# Patient Record
Sex: MALE | Race: WHITE | NOT HISPANIC OR LATINO | Employment: UNEMPLOYED | URBAN - METROPOLITAN AREA
[De-identification: names, ages, dates, MRNs, and addresses within clinical notes are randomized per-mention and may not be internally consistent; named-entity substitution may affect disease eponyms.]

---

## 2019-03-22 ENCOUNTER — HOSPITAL ENCOUNTER (EMERGENCY)
Facility: CLINIC | Age: 29
Discharge: HOME | End: 2019-03-22
Attending: EMERGENCY MEDICINE | Admitting: EMERGENCY MEDICINE
Payer: MEDICAID

## 2019-03-22 VITALS
HEIGHT: 64 IN | DIASTOLIC BLOOD PRESSURE: 97 MMHG | RESPIRATION RATE: 18 BRPM | BODY MASS INDEX: 42.08 KG/M2 | HEART RATE: 93 BPM | SYSTOLIC BLOOD PRESSURE: 142 MMHG | WEIGHT: 246.5 LBS | TEMPERATURE: 97.9 F | OXYGEN SATURATION: 98 %

## 2019-03-22 DIAGNOSIS — S61.211A LACERATION OF LEFT INDEX FINGER WITHOUT FOREIGN BODY WITHOUT DAMAGE TO NAIL, INITIAL ENCOUNTER: ICD-10-CM

## 2019-03-22 PROCEDURE — 90715 TDAP VACCINE 7 YRS/> IM: CPT | Performed by: EMERGENCY MEDICINE

## 2019-03-22 PROCEDURE — 99283 EMERGENCY DEPT VISIT LOW MDM: CPT | Mod: 25 | Performed by: EMERGENCY MEDICINE

## 2019-03-22 PROCEDURE — 90471 IMMUNIZATION ADMIN: CPT | Performed by: EMERGENCY MEDICINE

## 2019-03-22 PROCEDURE — 12001 RPR S/N/AX/GEN/TRNK 2.5CM/<: CPT | Performed by: EMERGENCY MEDICINE

## 2019-03-22 PROCEDURE — 12001 RPR S/N/AX/GEN/TRNK 2.5CM/<: CPT | Mod: Z6 | Performed by: EMERGENCY MEDICINE

## 2019-03-22 PROCEDURE — 25000132 ZZH RX MED GY IP 250 OP 250 PS 637: Performed by: EMERGENCY MEDICINE

## 2019-03-22 PROCEDURE — 25000128 H RX IP 250 OP 636: Performed by: EMERGENCY MEDICINE

## 2019-03-22 RX ORDER — LIDOCAINE HYDROCHLORIDE 10 MG/ML
INJECTION, SOLUTION EPIDURAL; INFILTRATION; INTRACAUDAL; PERINEURAL
Status: DISCONTINUED
Start: 2019-03-22 | End: 2019-03-22 | Stop reason: HOSPADM

## 2019-03-22 RX ORDER — CEPHALEXIN 500 MG/1
500 CAPSULE ORAL ONCE
Status: COMPLETED | OUTPATIENT
Start: 2019-03-22 | End: 2019-03-22

## 2019-03-22 RX ORDER — CEPHALEXIN 500 MG/1
500 CAPSULE ORAL 3 TIMES DAILY
Qty: 30 CAPSULE | Refills: 0 | Status: SHIPPED | OUTPATIENT
Start: 2019-03-22 | End: 2019-04-01

## 2019-03-22 RX ADMIN — CEPHALEXIN 500 MG: 500 CAPSULE ORAL at 19:41

## 2019-03-22 RX ADMIN — CLOSTRIDIUM TETANI TOXOID ANTIGEN (FORMALDEHYDE INACTIVATED), CORYNEBACTERIUM DIPHTHERIAE TOXOID ANTIGEN (FORMALDEHYDE INACTIVATED), BORDETELLA PERTUSSIS TOXOID ANTIGEN (GLUTARALDEHYDE INACTIVATED), BORDETELLA PERTUSSIS FILAMENTOUS HEMAGGLUTININ ANTIGEN (FORMALDEHYDE INACTIVATED), BORDETELLA PERTUSSIS PERTACTIN ANTIGEN, AND BORDETELLA PERTUSSIS FIMBRIAE 2/3 ANTIGEN 0.5 ML: 5; 2; 2.5; 5; 3; 5 INJECTION, SUSPENSION INTRAMUSCULAR at 19:41

## 2019-03-22 SDOH — HEALTH STABILITY: MENTAL HEALTH: HOW OFTEN DO YOU HAVE A DRINK CONTAINING ALCOHOL?: NEVER

## 2019-03-22 ASSESSMENT — ENCOUNTER SYMPTOMS
WOUND: 1
NUMBNESS: 1

## 2019-03-22 ASSESSMENT — MIFFLIN-ST. JEOR: SCORE: 1989.12

## 2019-03-22 NOTE — ED TRIAGE NOTES
Patient was cutting a package open with a butter knife and lacerated index finger of left hand. Does not know when last tetanus shot was.

## 2019-03-22 NOTE — ED AVS SNAPSHOT
[facility], [place], Emergency Department  [address] [place] [id number]  Phone:  [phone]                                    Pedro Funez   MRN: [id number]    Department:  [facility], Emergency Department   Date of Visit:  3/22/2019           After Visit Summary Signature Page    I have received my discharge instructions, and my questions have been answered. I have discussed any challenges I see with this plan with the nurse or doctor.    ..........................................................................................................................................  Patient/Patient Representative Signature      ..........................................................................................................................................  Patient Representative Print Name and Relationship to Patient    ..................................................               ................................................  Date                                   Time    ..........................................................................................................................................  Reviewed by Signature/Title    ...................................................              ..............................................  Date                                               Time          22EPIC Rev 08/18

## 2019-03-22 NOTE — ED PROVIDER NOTES
"  History     Chief Complaint   Patient presents with     Laceration     HPI  Pedro Funez is a 28 year old male with a history of anxiety and depression who presents for evaluation of a finger laceration. Patient states he was cutting a package with a butter knife this morning around 11 AM when he sliced his left index finger. He reports running the finger under tap water and wrapping it with toilet paper. Patient notes some mild numbness around the tip of his finger. He is not anticoagulated and has not taken anything today for pain. Patient states his last tetanus was over 10 years ago.    Past Medical History:   Diagnosis Date     Anxiety      Depressive disorder        Past Surgical History:   Procedure Laterality Date     HEAD & NECK SURGERY      Jaw surgery 2004       History reviewed. No pertinent family history.    Social History     Tobacco Use     Smoking status: Current Every Day Smoker     Packs/day: 1.00     Smokeless tobacco: Current User   Substance Use Topics     Alcohol use: No     Frequency: Never       Current Facility-Administered Medications   Medication     lidocaine (PF) (XYLOCAINE) 1 % injection     cephALEXin (KEFLEX) capsule 500 mg     Tdap (tetanus-diphtheria-acell pertussis) (ADACEL) injection 0.5 mL     Current Outpatient Medications   Medication     cephALEXin (KEFLEX) 500 MG capsule     HYDROXYZINE HCL PO     UNKNOWN TO PATIENT      No Known Allergies    I have reviewed the Medications, Allergies, Past Medical and Surgical History, and Social History in the Epic system.    Review of Systems   Skin: Positive for wound (laceration on left index finger).   Neurological: Positive for numbness (left index).   All other systems reviewed and are negative.      Physical Exam   BP: 142/75  Pulse: 97  Temp: 97.9  F (36.6  C)  Resp: 18  Height: 162.6 cm (5' 4\")  Weight: 111.8 kg (246 lb 8 oz)  SpO2: 97 %      Physical Exam   Constitutional: He is oriented to person, place, and time. Vital " signs are normal. He is cooperative.  Non-toxic appearance. He does not have a sickly appearance. He does not appear ill. No distress.   HENT:   Head: Normocephalic and atraumatic.   Eyes: Conjunctivae and EOM are normal. Pupils are equal, round, and reactive to light.   Neck: Normal range of motion. Neck supple.   Cardiovascular: Normal rate and regular rhythm. Exam reveals no gallop and no friction rub.   No murmur heard.  Pulmonary/Chest: Effort normal and breath sounds normal. No respiratory distress.   Abdominal: Soft. There is no tenderness.   Musculoskeletal: He exhibits no edema or tenderness.   Neurological: He is alert and oriented to person, place, and time. No cranial nerve deficit.   Skin: Skin is warm. Laceration (1 cm laceration on ventral side of left second digit. No evidence of foreign bodies) noted.   Psychiatric: He has a normal mood and affect. His behavior is normal. Judgment and thought content normal.   Nursing note and vitals reviewed.      ED Course   Laceration is simple according to patient he does not have any foreign bodies the physical examination is reassuring I will suture the wound and I will placed on prophylactic antibiotics we will update the tetanus shot.  Based on the history physical examination there is no need for x-rays     Laceration repair  Date/Time: 3/22/2019 7:15 PM  Performed by: Arley White MD  Authorized by: Arley White MD   Consent: Verbal consent obtained.  Risks and benefits: risks, benefits and alternatives were discussed  Consent given by: patient  Patient understanding: patient states understanding of the procedure being performed  Patient consent: the patient's understanding of the procedure matches consent given  Procedure consent: procedure consent matches procedure scheduled  Relevant documents: relevant documents present and verified  Test results: test results available and properly labeled  Site marked: the operative site was  "marked  Imaging studies: imaging studies not available  Required items: required blood products, implants, devices, and special equipment available  Patient identity confirmed: verbally with patient  Time out: Immediately prior to procedure a \"time out\" was called to verify the correct patient, procedure, equipment, support staff and site/side marked as required.  Body area: upper extremity  Location details: left index finger  Laceration length: 1 cm  Foreign bodies: no foreign bodies  Tendon involvement: none  Nerve involvement: none  Vascular damage: no  Anesthesia: local infiltration    Anesthesia:  Local Anesthetic: lidocaine 1% without epinephrine  Anesthetic total: 2 mL    Sedation:  Patient sedated: no    Preparation: Patient was prepped and draped in the usual sterile fashion.  Irrigation solution: saline  Irrigation method: jet lavage  Amount of cleaning: extensive  Debridement: moderate  Degree of undermining: none  Skin closure: 4-0 nylon  Technique: simple  Approximation: close  Approximation difficulty: simple  Dressing: 4x4 sterile gauze and antibiotic ointment  Patient tolerance: Patient tolerated the procedure well with no immediate complications               Critical Care time:  none             Labs Ordered and Resulted from Time of ED Arrival Up to the Time of Departure from the ED - No data to display         Assessments & Plan (with Medical Decision Making)   This is a 28-year-old male that came in for evaluation of simple laceration of the left index finger.  This happened as he was using a butter knife there is no possibility of foreign body through the fact that there was no segments of the butter knife missing and there was no other foreign bodies involved.  Patient has a neurologic intact extremity and left index finger, based on history physical examination presentation he was prepared with lidocaine without epinephrine for a total total of 4 sutures he tolerated procedure well.  His " tetanus and p.o. antibiotics will be given well.    I have reviewed the nursing notes.    I have reviewed the findings, diagnosis, plan and need for follow up with the patient.       Medication List      Started    cephALEXin 500 MG capsule  Commonly known as:  KEFLEX  500 mg, Oral, 3 TIMES DAILY            Final diagnoses:   Laceration of left lower extremity, initial encounter   INoble, am serving as a trained medical scribe to document services personally performed by Arley White MD, based on the provider's statements to me.   I, Alrey White MD, was physically present and have reviewed and verified the accuracy of this note documented by Noble Hodge.    3/22/2019   [facility], [place], EMERGENCY DEPARTMENT     Arley White MD  03/22/19 1919       Arley White MD  03/26/19 1515

## 2019-03-23 NOTE — DISCHARGE INSTRUCTIONS
Please take antibiotics as prescribed, make sure to keep the wound clean and dry and follow-up with your primary care doctor for reevaluation of the wound in 2 days, and for removal of the sutures in 7-10 days.  If fevers, redness, nausea vomiting worse symptoms or if any other concerns develop please return to emergency department as soon as possible

## 2019-04-24 ENCOUNTER — HOSPITAL ENCOUNTER (EMERGENCY)
Facility: CLINIC | Age: 29
Discharge: HOME | End: 2019-04-24
Attending: FAMILY MEDICINE | Admitting: FAMILY MEDICINE
Payer: MEDICAID

## 2019-04-24 VITALS
BODY MASS INDEX: 40.97 KG/M2 | WEIGHT: 240 LBS | RESPIRATION RATE: 18 BRPM | SYSTOLIC BLOOD PRESSURE: 140 MMHG | HEART RATE: 90 BPM | TEMPERATURE: 97.7 F | HEIGHT: 64 IN | OXYGEN SATURATION: 100 % | DIASTOLIC BLOOD PRESSURE: 70 MMHG

## 2019-04-24 PROCEDURE — 99281 EMR DPT VST MAYX REQ PHY/QHP: CPT

## 2019-04-24 ASSESSMENT — MIFFLIN-ST. JEOR: SCORE: 1959.63

## 2019-04-24 NOTE — DISCHARGE INSTRUCTIONS
Keep the area, clean and dry. Continue to watch for signs and symptoms of infection: swelling, redness, drainage.

## 2019-04-24 NOTE — ED AVS SNAPSHOT
[facility], [place], Emergency Department  [address] [place] [id number]  Phone:  [phone]                                    Pedro Funez   MRN: [id number]    Department:  [facility], Emergency Department   Date of Visit:  4/24/2019           After Visit Summary Signature Page    I have received my discharge instructions, and my questions have been answered. I have discussed any challenges I see with this plan with the nurse or doctor.    ..........................................................................................................................................  Patient/Patient Representative Signature      ..........................................................................................................................................  Patient Representative Print Name and Relationship to Patient    ..................................................               ................................................  Date                                   Time    ..........................................................................................................................................  Reviewed by Signature/Title    ...................................................              ..............................................  Date                                               Time          22EPIC Rev 08/18

## 2021-08-10 ENCOUNTER — OFFICE VISIT (OUTPATIENT)
Dept: FAMILY MEDICINE | Facility: CLINIC | Age: 31
End: 2021-08-10
Payer: COMMERCIAL

## 2021-08-10 VITALS
BODY MASS INDEX: 36.5 KG/M2 | WEIGHT: 227.1 LBS | SYSTOLIC BLOOD PRESSURE: 127 MMHG | HEIGHT: 66 IN | TEMPERATURE: 98.9 F | HEART RATE: 105 BPM | DIASTOLIC BLOOD PRESSURE: 86 MMHG | OXYGEN SATURATION: 96 %

## 2021-08-10 DIAGNOSIS — F41.9 ANXIETY: ICD-10-CM

## 2021-08-10 DIAGNOSIS — E66.812 CLASS 2 OBESITY WITHOUT SERIOUS COMORBIDITY WITH BODY MASS INDEX (BMI) OF 36.0 TO 36.9 IN ADULT, UNSPECIFIED OBESITY TYPE: ICD-10-CM

## 2021-08-10 DIAGNOSIS — E11.9 TYPE 2 DIABETES MELLITUS WITHOUT COMPLICATION, WITHOUT LONG-TERM CURRENT USE OF INSULIN (H): Primary | ICD-10-CM

## 2021-08-10 DIAGNOSIS — H53.8 BLURRED VISION: ICD-10-CM

## 2021-08-10 LAB
ALBUMIN UR-MCNC: NEGATIVE MG/DL
APPEARANCE UR: CLEAR
BILIRUB UR QL STRIP: NEGATIVE
COLOR UR AUTO: ABNORMAL
GLUCOSE UR STRIP-MCNC: 500 MG/DL
HGB UR QL STRIP: NEGATIVE
KETONES UR STRIP-MCNC: NEGATIVE MG/DL
LEUKOCYTE ESTERASE UR QL STRIP: NEGATIVE
NITRATE UR QL: NEGATIVE
PH UR STRIP: 5.5 [PH] (ref 5–7)
SP GR UR STRIP: 1.02 (ref 1–1.03)
UROBILINOGEN UR STRIP-MCNC: NORMAL MG/DL

## 2021-08-10 PROCEDURE — 81003 URINALYSIS AUTO W/O SCOPE: CPT | Performed by: NURSE PRACTITIONER

## 2021-08-10 RX ORDER — BLOOD-GLUCOSE METER
EACH MISCELLANEOUS
Qty: 1 KIT | Refills: 0 | Status: SHIPPED | OUTPATIENT
Start: 2021-08-10 | End: 2021-09-03

## 2021-08-10 RX ORDER — BUPROPION HYDROCHLORIDE 150 MG/1
150 TABLET, EXTENDED RELEASE ORAL 2 TIMES DAILY
COMMUNITY
End: 2021-09-14

## 2021-08-10 RX ORDER — HYDROXYZINE HYDROCHLORIDE 25 MG/1
25 TABLET, FILM COATED ORAL 3 TIMES DAILY PRN
Qty: 90 TABLET | Refills: 1 | Status: SHIPPED | OUTPATIENT
Start: 2021-08-10 | End: 2022-05-10

## 2021-08-10 ASSESSMENT — ANXIETY QUESTIONNAIRES
5. BEING SO RESTLESS THAT IT IS HARD TO SIT STILL: MORE THAN HALF THE DAYS
7. FEELING AFRAID AS IF SOMETHING AWFUL MIGHT HAPPEN: MORE THAN HALF THE DAYS
3. WORRYING TOO MUCH ABOUT DIFFERENT THINGS: NEARLY EVERY DAY
6. BECOMING EASILY ANNOYED OR IRRITABLE: MORE THAN HALF THE DAYS
GAD7 TOTAL SCORE: 16
1. FEELING NERVOUS, ANXIOUS, OR ON EDGE: NEARLY EVERY DAY
IF YOU CHECKED OFF ANY PROBLEMS ON THIS QUESTIONNAIRE, HOW DIFFICULT HAVE THESE PROBLEMS MADE IT FOR YOU TO DO YOUR WORK, TAKE CARE OF THINGS AT HOME, OR GET ALONG WITH OTHER PEOPLE: SOMEWHAT DIFFICULT
2. NOT BEING ABLE TO STOP OR CONTROL WORRYING: NEARLY EVERY DAY

## 2021-08-10 ASSESSMENT — MIFFLIN-ST. JEOR: SCORE: 1913.11

## 2021-08-10 ASSESSMENT — PATIENT HEALTH QUESTIONNAIRE - PHQ9
SUM OF ALL RESPONSES TO PHQ QUESTIONS 1-9: 13
5. POOR APPETITE OR OVEREATING: SEVERAL DAYS

## 2021-08-10 NOTE — PATIENT INSTRUCTIONS
Diabetes Type 2  Restart metformin 1000mg twice daily  Labs today: A1c, CBC, CMP, UA, TSH microalbumin,lipids  Referred to ophthalmology for eye exam  Foot checks daily: they look great today  Goal A1c~7%  Goal -150  Acucheck glucometer and lancets ordered    Anxiety and depression  Refilled Hydroxyzine 25mg every 8 hours as needed for anxiety.   Discussed counseling referral.   Plan to take buproprion daily at 150mg twice daily    Establish care  Return to clinic for full physical exam and lab results

## 2021-08-10 NOTE — LETTER
August 11, 2021      Pedro Funez  [address]   [facility] [address]        Dear Mr.Siegl Funez,    We are writing to inform you of your test results.    Test results indicate you may require additional follow up, see comment below.    Resulted Orders   Urinalysis, Micro If (UA) ([facility])   Result Value Ref Range    Color Urine Light Yellow Colorless, Straw, Light Yellow, Yellow    Appearance Urine Clear Clear    Glucose Urine 500  (A) Negative mg/dL    Bilirubin Urine Negative Negative    Ketones Urine Negative Negative mg/dL    Specific Gravity Urine 1.022 1.003 - 1.035    Blood Urine Negative Negative    pH Urine 5.5 5.0 - 7.0    Protein Albumin Urine Negative Negative mg/dL    Urobilinogen Urine Normal Normal, 2.0 mg/dL    Nitrite Urine Negative Negative    Leukocyte Esterase Urine Negative Negative    Narrative    Microscopic not indicated     Hi Pedro, you are definitely spilling a lot of sugar in your urine. Please plan to follow up with me as we discussed at your last visit. I understand that the MA was unable to draw your blood, so either we can send you to the [facility] for a lab draw or we can try them here when you return. Thank you.   If you have any questions or concerns, please call the clinic at the number listed above.       Sincerely,      JUAN ALBERTO Moncada CNP

## 2021-08-10 NOTE — PROGRESS NOTES
HPI       Pedro Funez is a 31 year old  who presents for No chief complaint on file.    31 year old male diagnosed with DM 2 6 years ago. Has had housing in [place] [facility] going on 3 years, Prior to that was homeless which was when he was diagnosed. Was taking metformin but been out of Metformin for at least year. Has not had follow up in a bout 1 year.  Current symptoms: no increased thirst or urination, has not noted burning on urination. Denies GI symptoms.   Has noted trouble sleeping secondary to depression, anxiety. Denies numbness or tingling in fingers and toes.  Denies sweating. Has days where cannot think properly.   Has had [place] COVID vaccine.    Feels like weight has been stable.     Recurrent depression and anxiety x 10 years. Takes buproprion and prn hydroxyzine sometimes but not regularly. Estimate is he takes bupropion every other day.   Not currently suicidal, but has thought about it in the past. No current plan.  Has family supports    Problem, Medication and Allergy Lists were   reviewed and updated if needed.   There are no problems to display for this patient.        Current Outpatient Medications   Medication Sig Dispense Refill     blood glucose monitoring (ACCU-CHEK FERMIN PLUS) meter device kit Use to test blood sugars 2 times daily or as directed. 1 kit 0     blood glucose monitoring (ACCU-CHEK MULTICLIX) lancets Use to test blood sugar 2 times daily or as directed. 102 each prn     buPROPion (WELLBUTRIN SR) 150 MG 12 hr tablet Take 150 mg by mouth 2 times daily       hydrOXYzine (ATARAX) 25 MG tablet Take 1 tablet (25 mg) by mouth 3 times daily as needed for anxiety 90 tablet 1     metFORMIN (GLUCOPHAGE) 1000 MG tablet Take 1 tablet (1,000 mg) by mouth 2 times daily (with meals) 180 tablet 1       No Known Allergies.    Patient is   a new patient to this clinic and so  I reviewed/updated the Past Medical History, the Family History and the Social History   Past Medical  History:   Diagnosis Date     Anxiety      Depressive disorder      Type 2 diabetes mellitus without complication, without long-term current use of insulin (H)     Diagnosed 2016     Family History   Problem Relation Age of Onset     Hypertension Mother      Diabetes Type 1 Maternal Grandmother      Diabetes Maternal Grandfather         Type 2     Social History     Socioeconomic History     Marital status: Single     Spouse name: None     Number of children: None     Years of education: None     Highest education level: None   Occupational History     None   Tobacco Use     Smoking status: Former Smoker     Packs/day: 0.00     Years: 4.00     Pack years: 0.00     Types: Cigarettes     Quit date:      Years since quittin.6     Smokeless tobacco: Current User     Types: Chew   Substance and Sexual Activity     Alcohol use: No     Drug use: No     Sexual activity: Never   Other Topics Concern     None   Social History Narrative    Unemployed currently; retail     Social Determinants of Health     Financial Resource Strain:      Difficulty of Paying Living Expenses:    Food Insecurity:      Worried About Running Out of Food in the Last Year:      Ran Out of Food in the Last Year:    Transportation Needs:      Lack of Transportation (Medical):      Lack of Transportation (Non-Medical):    Physical Activity:      Days of Exercise per Week:      Minutes of Exercise per Session:    Stress:      Feeling of Stress :    Social Connections:      Frequency of Communication with Friends and Family:      Frequency of Social Gatherings with Friends and Family:      Attends Faith Services:      Active Member of Clubs or Organizations:      Attends Club or Organization Meetings:      Marital Status:    Intimate Partner Violence:      Fear of Current or Ex-Partner:      Emotionally Abused:      Physically Abused:      Sexually Abused:    .         Review of Systems:   Review of Systems  GEN: denies weight change, fever,  "chills  CV: negative for chest pain, irregular heart beat or peripheral edema  RESP: negative for SOB, had cough but resolved.    GI: negative for constipation, abdominal pain  : negative for burning on urination, dysuria, urgency or frequency  NEURO:  Negative for numbness or tingling in extremities  MOOD: Anxiety and depression as per HPI       Physical Exam:   /86   Pulse 105   Temp 98.9  F (37.2  C) (Oral)   Ht 1.669 m (5' 5.7\")   Wt 103 kg (227 lb 1.6 oz)   SpO2 96%   BMI 36.99 kg/m    Vital signs normal except DBP elevated, HR>100, BMI elevated     Physical Exam  GEN: alert male in no acute distress  EYES: COLE, EOM intact  Thyroid: smooth and not enlarged  CV: S1 S2 HRRR no MRG, negative peripheral edema  RESP: Lungs CTA  FOOT EXAM: Sensation intact to monofilament.  Feet pink warm, cap refill < 2 seconds. No lesions, some callouses heels bilaterally.   SKIN: pink  Intact.   MOOD:   PHQ 8/10/2021   PHQ-9 Total Score 13   Q9: Thoughts of better off dead/self-harm past 2 weeks Several days     REBA-7 SCORE 8/10/2021   Total Score 16           Results:   Results are ordered and pending    Assessment and Plan       1. Type 2 diabetes mellitus without complication, without long-term current use of insulin (H)  Obesity  Discussed lifestyle: increase fruits and vegetables, low carbohydrate, sweets, fats.Reinforced wallking  - metFORMIN (GLUCOPHAGE) 1000 MG tablet; Take 1 tablet (1,000 mg) by mouth 2 times daily (with meals)  Dispense: 180 tablet; Refill: 1  - Comprehensive metabolic panel; Future  - CBC with platelets; Future  - Hemoglobin A1c; Future  - Urinalysis, Micro If (UA) ([facility]IN[facility]); Future  - Albumin Random Urine Quantitative with Creat Ratio; Future  - TSH with free T4 reflex; Future  - Lipid panel reflex to direct LDL Fasting; Future  - Adult Eye Referral; Future  - blood glucose monitoring (ACCU-CHEK FERMIN PLUS) meter device kit; Use to test blood sugars 2 times daily or as directed. "  Dispense: 1 kit; Refill: 0  - blood glucose monitoring (ACCU-CHEK MULTICLIX) lancets; Use to test blood sugar 2 times daily or as directed.  Dispense: 102 each; Refill: prn  - Comprehensive metabolic panel  - CBC with platelets  - Hemoglobin A1c  - Urinalysis, Micro If (UA) ([facility])  - Albumin Random Urine Quantitative with Creat Ratio  - TSH with free T4 reflex  - Lipid panel reflex to direct LDL Fasting  - PA FOOT EXAM NO CHARGE    2. Anxiety  Discussed and recommended counseling.  Refilled hydroxyzine. Recommend regular buproprion dosing. To notify us if mood worsens. No suicidal plan currently and assessed for supports which he has.   - hydrOXYzine (ATARAX) 25 MG tablet; Take 1 tablet (25 mg) by mouth 3 times daily as needed for anxiety  Dispense: 90 tablet; Refill: 1  - TSH with free T4 reflex; Future  - TSH with free T4 reflex    3. Blurred vision  No recent eye exam. Referred  - Adult Eye Referral; Future    4. Schedule PE and lab results follow up     ADDENDUM: MA Unable to draw labs. Will draw at next encounter.   There are no discontinued medications.    Options for treatment and follow-up care were reviewed with the patient. Pedro Funez  engaged in the decision making process and verbalized understanding of the options discussed and agreed with the final plan.    JUAN ALBERTO Moncada CNP

## 2021-08-10 NOTE — NURSING NOTE
"ROOM:2    Preferred Name: Pedro     31 year old  Chief Complaint   Patient presents with     Establish Care     Pt wanting to esablish care     Diabetes       Blood pressure 127/86, pulse 105, temperature 98.9  F (37.2  C), temperature source Oral, height 1.669 m (5' 5.7\"), weight 103 kg (227 lb 1.6 oz), SpO2 96 %. Body mass index is 36.99 kg/m .  BP completed using cuff size:    There is no problem list on file for this patient.      Wt Readings from Last 2 Encounters:   08/10/21 103 kg (227 lb 1.6 oz)   19 108.9 kg (240 lb)     BP Readings from Last 3 Encounters:   08/10/21 127/86   19 140/70   19 (!) 142/97       No Known Allergies    Current Outpatient Medications   Medication     buPROPion (WELLBUTRIN SR) 150 MG 12 hr tablet     hydrOXYzine (ATARAX) 25 MG tablet     metFORMIN (GLUCOPHAGE) 1000 MG tablet     No current facility-administered medications for this visit.       Social History     Tobacco Use     Smoking status: Former Smoker     Packs/day: 0.00     Years: 4.00     Pack years: 0.00     Types: Cigarettes     Quit date:      Years since quittin.6     Smokeless tobacco: Current User     Types: Chew   Substance Use Topics     Alcohol use: No     Drug use: No       Honoring Choices - Health Care Directive Guide offered to patient at time of visit.    Health Maintenance Due   Topic Date Due     PREVENTIVE CARE VISIT  Never done     ADVANCE CARE PLANNING  Never done     Pneumococcal Vaccine: Pediatrics (0 to 5 Years) and At-Risk Patients (6 to 64 Years) (1 of 2 - PPSV23) Never done     HIV SCREENING  Never done     HEPATITIS C SCREENING  Never done     PHQ-2  Never done       Immunization History   Administered Date(s) Administered     TDAP Vaccine (Adacel) 2019       No results found for: PAP    No lab results found.    No flowsheet data found.    No flowsheet data found.    No flowsheet data found.    No flowsheet data found.    Sunshine Lerma    August 10, 2021 4:16 " PM

## 2021-08-11 ASSESSMENT — ANXIETY QUESTIONNAIRES: GAD7 TOTAL SCORE: 16

## 2021-08-12 DIAGNOSIS — E11.9 TYPE 2 DIABETES MELLITUS WITHOUT COMPLICATION, WITHOUT LONG-TERM CURRENT USE OF INSULIN (H): Primary | ICD-10-CM

## 2021-08-12 NOTE — TELEPHONE ENCOUNTER
blood glucose (NO BRAND SPECIFIED) test strip      Not on active med list  Last Office Visit : 8/10/21  Future Office visit:  8/17/21    Routing refill request to provider for review/approval because:  Drug not active on patient's medication list

## 2021-08-17 ENCOUNTER — OFFICE VISIT (OUTPATIENT)
Dept: FAMILY MEDICINE | Facility: CLINIC | Age: 31
End: 2021-08-17
Payer: COMMERCIAL

## 2021-08-17 ENCOUNTER — OFFICE VISIT (OUTPATIENT)
Dept: PHARMACY | Facility: CLINIC | Age: 31
End: 2021-08-17
Payer: COMMERCIAL

## 2021-08-17 VITALS
HEIGHT: 66 IN | SYSTOLIC BLOOD PRESSURE: 125 MMHG | TEMPERATURE: 98.7 F | OXYGEN SATURATION: 95 % | BODY MASS INDEX: 35.23 KG/M2 | HEART RATE: 105 BPM | DIASTOLIC BLOOD PRESSURE: 97 MMHG | WEIGHT: 219.2 LBS

## 2021-08-17 DIAGNOSIS — E11.9 DIABETES MELLITUS (H): Primary | ICD-10-CM

## 2021-08-17 DIAGNOSIS — E66.01 CLASS 2 SEVERE OBESITY WITH SERIOUS COMORBIDITY AND BODY MASS INDEX (BMI) OF 35.0 TO 35.9 IN ADULT, UNSPECIFIED OBESITY TYPE (H): ICD-10-CM

## 2021-08-17 DIAGNOSIS — Z00.00 HEALTH MAINTENANCE EXAMINATION: ICD-10-CM

## 2021-08-17 DIAGNOSIS — R06.83 SNORING: ICD-10-CM

## 2021-08-17 DIAGNOSIS — E66.01 MORBID OBESITY (H): ICD-10-CM

## 2021-08-17 DIAGNOSIS — E66.812 CLASS 2 SEVERE OBESITY WITH SERIOUS COMORBIDITY AND BODY MASS INDEX (BMI) OF 35.0 TO 35.9 IN ADULT, UNSPECIFIED OBESITY TYPE (H): ICD-10-CM

## 2021-08-17 DIAGNOSIS — E11.9 TYPE 2 DIABETES MELLITUS WITHOUT COMPLICATION, WITHOUT LONG-TERM CURRENT USE OF INSULIN (H): Primary | ICD-10-CM

## 2021-08-17 LAB
ALBUMIN SERPL-MCNC: 4 G/DL (ref 3.4–5)
ALP SERPL-CCNC: 86 U/L (ref 40–150)
ALT SERPL W P-5'-P-CCNC: 40 U/L (ref 0–70)
ANION GAP SERPL CALCULATED.3IONS-SCNC: 7 MMOL/L (ref 3–14)
AST SERPL W P-5'-P-CCNC: 23 U/L (ref 0–45)
BILIRUB SERPL-MCNC: 0.5 MG/DL (ref 0.2–1.3)
BUN SERPL-MCNC: 12 MG/DL (ref 7–30)
CALCIUM SERPL-MCNC: 8.9 MG/DL (ref 8.5–10.1)
CHLORIDE BLD-SCNC: 105 MMOL/L (ref 94–109)
CHOLEST SERPL-MCNC: 190 MG/DL
CO2 SERPL-SCNC: 24 MMOL/L (ref 20–32)
CREAT SERPL-MCNC: 0.75 MG/DL (ref 0.66–1.25)
ERYTHROCYTE [DISTWIDTH] IN BLOOD BY AUTOMATED COUNT: 12.6 % (ref 10–15)
FASTING STATUS PATIENT QL REPORTED: ABNORMAL
GFR SERPL CREATININE-BSD FRML MDRD: >90 ML/MIN/1.73M2
GLUCOSE BLD-MCNC: 104 MG/DL (ref 70–99)
HBA1C MFR BLD: 6.9 %
HCT VFR BLD AUTO: 46 % (ref 40–53)
HDLC SERPL-MCNC: 42 MG/DL
HGB BLD-MCNC: 15.3 G/DL (ref 13.3–17.7)
LDLC SERPL CALC-MCNC: 124 MG/DL
MCH RBC QN AUTO: 30.1 PG (ref 26.5–33)
MCHC RBC AUTO-ENTMCNC: 33.3 G/DL (ref 31.5–36.5)
MCV RBC AUTO: 91 FL (ref 78–100)
NONHDLC SERPL-MCNC: 148 MG/DL
PLATELET # BLD AUTO: 366 10E3/UL (ref 150–450)
POTASSIUM BLD-SCNC: 4.1 MMOL/L (ref 3.4–5.3)
PROT SERPL-MCNC: 8 G/DL (ref 6.8–8.8)
RBC # BLD AUTO: 5.08 10E6/UL (ref 4.4–5.9)
SODIUM SERPL-SCNC: 136 MMOL/L (ref 133–144)
TRIGL SERPL-MCNC: 119 MG/DL
TSH SERPL DL<=0.005 MIU/L-ACNC: 1.39 MU/L (ref 0.4–4)
WBC # BLD AUTO: 7.3 10E3/UL (ref 4–11)

## 2021-08-17 PROCEDURE — 84075 ASSAY ALKALINE PHOSPHATASE: CPT | Performed by: NURSE PRACTITIONER

## 2021-08-17 PROCEDURE — 80061 LIPID PANEL: CPT | Performed by: NURSE PRACTITIONER

## 2021-08-17 PROCEDURE — 85027 COMPLETE CBC AUTOMATED: CPT | Performed by: NURSE PRACTITIONER

## 2021-08-17 PROCEDURE — 84443 ASSAY THYROID STIM HORMONE: CPT | Performed by: NURSE PRACTITIONER

## 2021-08-17 ASSESSMENT — MIFFLIN-ST. JEOR: SCORE: 1878.86

## 2021-08-17 NOTE — LETTER
August 19, 2021      Pedro Funez  [address]   [facility] [address]        Dear Mr.Siegl Funez,    We are writing to inform you of your test results.    Test results indicate you may require additional follow up, see comment below.    Resulted Orders   Hemoglobin A1c   Result Value Ref Range    Hemoglobin A1C 6.9 %   Lipid panel reflex to direct LDL Fasting   Result Value Ref Range    Cholesterol 190 <200 mg/dL      Comment:      Age 0-19 years  Desirable: <170 mg/dL  Borderline high:  170-199 mg/dl  High:            >199 mg/dl    Age 20 years and older  Desirable: <200 mg/dL    Triglycerides 119 <150 mg/dL      Comment:      0-9 years:  Normal:    Less than 75 mg/dL  Borderline high:  75-99 mg/dL  High:             Greater than or equal to 100 mg/dL    0-19 years:  Normal:    Less than 90 mg/dL  Borderline high:   mg/dL  High:             Greater than or equal to 130 mg/dL    20 years and older:  Normal:    Less than 150 mg/dL  Borderline high:  150-199 mg/dL  High:             200-499 mg/dL  Very high:   Greater than or equal to 500 mg/dL    Direct Measure HDL 42 >=40 mg/dL      Comment:      0-19 years:       Greater than or equal to 45 mg/dL   Low: Less than 40 mg/dL   Borderline low: 40-44 mg/dL     20 years and older:   Female: Greater than or equal to 50 mg/dL   Male:   Greater than or equal to 40 mg/dL         LDL Cholesterol Calculated 124 (H) <=100 mg/dL      Comment:      Age 0-19 years:  Desirable: 0-110 mg/dL   Borderline high: 110-129 mg/dL   High: >= 130 mg/dL    Age 20 years and older:  Desirable: <100mg/dL  Above desirable: 100-129 mg/dL   Borderline high: 130-159 mg/dL   High: 160-189 mg/dL   Very high: >= 190 mg/dL    Non HDL Cholesterol 148 (H) <130 mg/dL      Comment:      0-19 years:  Desirable:          Less than 120 mg/dL  Borderline high:   120-144 mg/dL  High:                   Greater than or equal to 145 mg/dL    20 years and older:  Desirable:          130  mg/dL  Above Desirable: 130-159 mg/dL  Borderline high:   160-189 mg/dL  High:               190-219 mg/dL  Very high:     Greater than or equal to 220 mg/dL    Patient Fasting > 8hrs? Unknown    TSH with free T4 reflex   Result Value Ref Range    TSH 1.39 0.40 - 4.00 mU/L   CBC with platelets   Result Value Ref Range    WBC Count 7.3 4.0 - 11.0 10e3/uL    RBC Count 5.08 4.40 - 5.90 10e6/uL    Hemoglobin 15.3 13.3 - 17.7 g/dL    Hematocrit 46.0 40.0 - 53.0 %    MCV 91 78 - 100 fL    MCH 30.1 26.5 - 33.0 pg    MCHC 33.3 31.5 - 36.5 g/dL    RDW 12.6 10.0 - 15.0 %    Platelet Count 366 150 - 450 10e3/uL   Comprehensive metabolic panel   Result Value Ref Range    Sodium 136 133 - 144 mmol/L    Potassium 4.1 3.4 - 5.3 mmol/L    Chloride 105 94 - 109 mmol/L    Carbon Dioxide (CO2) 24 20 - 32 mmol/L    Anion Gap 7 3 - 14 mmol/L    Urea Nitrogen 12 7 - 30 mg/dL    Creatinine 0.75 0.66 - 1.25 mg/dL    Calcium 8.9 8.5 - 10.1 mg/dL    Glucose 104 (H) 70 - 99 mg/dL    Alkaline Phosphatase 86 40 - 150 U/L    AST 23 0 - 45 U/L    ALT 40 0 - 70 U/L    Protein Total 8.0 6.8 - 8.8 g/dL    Albumin 4.0 3.4 - 5.0 g/dL    Bilirubin Total 0.5 0.2 - 1.3 mg/dL    GFR Estimate >90 >60 mL/min/1.73m2      Comment:      As of July 11, 2021, eGFR is calculated by the CKD-EPI creatinine equation, without race adjustment. eGFR can be influenced by muscle mass, exercise, and diet. The reported eGFR is an estimation only and is only applicable if the renal function is stable.     Jet Vasquez, here are your lab results. As you know your 3 month blood sugar average (A1c) was good at 6.9. Your  Blood sugar when you were in was just above normal at 104. Your liver function, kidney function, electrolytes, and complete blood count are all normal. Your cholesterol levels show that your LDL (cholesterol that can cause plaque buildup in your arteries is above normal). Changing your diet to include more vegetables, reducing high fat foods like fried foods, fast  foods, snacks, sweets; losing weight and exercising will be important to reduce the LDL and will help blood sugar also.  We should see you again in 3 months, sooner if you have any concerns. Thank you.   If you have any questions or concerns, please call the clinic at the number listed above.       Sincerely,      JUAN ALBERTO Moncada CNP

## 2021-08-17 NOTE — PATIENT INSTRUCTIONS
Health Maintenance, Type 2 DM  Labs pending: CMP, CBC, Vitamin D, Protein: Creatinine ratio  Continue with metformin, bupropion, and Hydroxyzine at current doses. Notify pharmacy when need refills  Monitor BP: Follow up in 1 month to recheck. Consider addition of lisinopril if BP remains elevated.   Diet: continue to work on weight loss, increase vegetables, decrease high fat, high salt foods. Continue exercise.   Let us know if cannot get glucometer and teststrips      Snoring  Sleep Study ordered

## 2021-08-17 NOTE — PROGRESS NOTES
"SUBJECTIVE: Pedro is a 31 year old male who was referred by Mirian Lo for MT services for diabetes management and medication management.      DM: Since last visit, Pedro was able to  metformin and has been taking it.  He now notes that he is not as groggy and feels he is sleepign better. He did have some stomach upset initially for the first day, but now feels well. He notes that he is supposed to take metformin twice daily, but does forget to take in the evening at times . He is thinking of a way to remember metformin at night.     He has been trying to eat healthier and walking more. Most days he walks for 90 minutes    He doesn't have a glucometer yet since there was some billing confusion at the pharmacy.  He thinks the billing issues have been resolved and will try to  the glucometer tonight.    Anxiety: taking bupropion and hydroxyzine. He feels these are working well. He occasionally forgets to take bupropion in the evening     Environmental factors that impact patient: Notes he lives on limited income - this impacts his food selections      OBJECTIVE:     REBA-7 SCORE 8/10/2021   Total Score 16       PHQ-9 SCORE 8/10/2021   PHQ-9 Total Score 13         VITALS:  BP Readings from Last 3 Encounters:   08/10/21 127/86   04/24/19 140/70   03/22/19 (!) 142/97           Weight:   Wt Readings from Last 1 Encounters:   08/10/21 103 kg (227 lb 1.6 oz)       Height:   Ht Readings from Last 1 Encounters:   08/10/21 1.669 m (5' 5.7\")       LABORATORY VALUES:   Last A1C:  No results found for: A1C.    Last Basic Metabolic Panel:  No results found for: NA   No results found for: POTASSIUM  No results found for: CHLORIDE  No results found for: RODY  No results found for: CO2  No results found for: BUN  No results found for: CR  No results found for: GLC    Lipid Panel Labs  No results found for: CHOL  No results found for: TRIG  No results found for: HDL  No results found for: LDL    Hepatic Panel Labs  No " results found for: AST  No results found for: ALT      SOCIAL AND FAMILY HISTORY  Social History     Tobacco Use     Smoking status: Former Smoker     Packs/day: 0.00     Years: 4.00     Pack years: 0.00     Types: Cigarettes     Quit date:      Years since quittin.6     Smokeless tobacco: Current User     Types: Chew   Substance Use Topics     Alcohol use: No    .  Most Recent Immunizations   Administered Date(s) Administered     TDAP Vaccine (Adacel) 2019       CURRENT MEDICATIONS:   Current Outpatient Medications   Medication Sig Dispense Refill     blood glucose (NO BRAND SPECIFIED) test strip Use to test blood sugar 2 times daily or as directed. 200 strip 3     blood glucose monitoring (ACCU-CHEK FERMIN PLUS) meter device kit Use to test blood sugars 2 times daily or as directed. 1 kit 0     blood glucose monitoring (ACCU-CHEK MULTICLIX) lancets Use to test blood sugar 2 times daily or as directed. 102 each prn     buPROPion (WELLBUTRIN SR) 150 MG 12 hr tablet Take 150 mg by mouth 2 times daily       hydrOXYzine (ATARAX) 25 MG tablet Take 1 tablet (25 mg) by mouth 3 times daily as needed for anxiety 90 tablet 1     metFORMIN (GLUCOPHAGE) 1000 MG tablet Take 1 tablet (1,000 mg) by mouth 2 times daily (with meals) 180 tablet 1       ALLERGIES:   No Known Allergies       ASSESSMENT:  Diabetes: At goal based on A1c drawn in clinic today. Goal A1c is <7%. Regardless, it will be best for Pedro to take metformin as prescribed. A memory aide may help him.   Medication therapy problem: Adherence    Anxiety: At goal per patient, however, again it is best for patient to take bupropion as prescribed.  Medication therapy problem: Adherence      All medications were reviewed and found to be indicated, effective, safe and convenient unless drug therapy problem identified as described above.    PLAN:     - Provided Pedro with pill box and discussed ways that he can remember to take his evening dose of metformin  (and evening dose of bupropion).  - Will call pharmacy to discuss availability of glucometer and testing supplies.  - Follow up in 4 weeks, sooner if needed.     Options for treatment and/or follow-up care were reviewed with the patient. Pedro Funez was engaged and actively involved in the decision making process. He/She verbalized understanding of the options discussed and was satisfied with the final plan.    Patient was provided with written instructions/medication list via AVS.       Medical conditions reviewed: 2    Medications reviewed: 3    MTP identified: 2    Time spent: 20 minutes    Level of service: 3

## 2021-08-17 NOTE — PROGRESS NOTES
3  SUBJECTIVE:   CC: Pedro Funez is an 31 year old male who presents for preventive health visit.       Healthy Habits:    Do you get at least three servings of calcium containing foods daily (dairy, green leafy vegetables, etc.)? yes    Amount of exercise or daily activities, outside of work: walks at least 30 minutes per day,  Sometimes 1.5 h per day    Problems taking medications regularly Yes, sometimes forgets to take evening dose    Medication side effects: No    Have you had an eye exam in the past two years? No but scheduled    Do you see a dentist twice per year? No. Planning to schedule    Do you have sleep apnea, excessive snoring or daytime drowsiness?yes, loud snoring  Type 2 DM: seen last week and restarted metformin; 1 day  Of GI upset but OK after that. Has not yet gotten glucometer or test strips. There was an insurance problem which should be resolved now. Unable to draw labs that were ordered last week so will be redrawn this week.    Today's PHQ-2 Score: No flowsheet data found.  PHQ-9 (13) and REBA-7 (16) done on 21. Reports anxiety is somewhat improved since resuming Hydroxyzine.     Abuse: Current or Past(Physical, Sexual or Emotional)- No  Do you feel safe in your environment? Yes    Have you ever done Advance Care Planning?No, not at this time    Social History     Tobacco Use     Smoking status: Former Smoker     Packs/day: 0.00     Years: 4.00     Pack years: 0.00     Types: Cigarettes     Quit date: 2020     Years since quittin.6     Smokeless tobacco: Current User     Types: Chew   Substance Use Topics     Alcohol use: No     If you drink alcohol do you typically have >3 drinks per day or >7 drinks per week? No                      Last PSA: No results found for: PSA    Reviewed orders with patient. Reviewed health maintenance and updated orders accordingly - Yes  Tdap UTD  Lab work is in process    Reviewed and updated as needed this visit by clinical staff  Tobacco   Allergies  Meds   Med Hx  Surg Hx  Fam Hx  Soc Hx      Past Medical History:   Diagnosis Date     Anxiety      Depressive disorder      Type 2 diabetes mellitus without complication, without long-term current use of insulin (H)     Diagnosed 2016       Past Surgical History:   Procedure Laterality Date     HEAD & NECK SURGERY      Jaw surgery        Family History   Problem Relation Age of Onset     Hypertension Mother      Diabetes Type 1 Maternal Grandmother      Diabetes Maternal Grandfather         Type 2       Social History     Tobacco Use     Smoking status: Former Smoker     Packs/day: 0.00     Years: 4.00     Pack years: 0.00     Types: Cigarettes     Quit date:      Years since quittin.6     Smokeless tobacco: Current User     Types: Chew   Substance Use Topics     Alcohol use: No       Reviewed and updated as needed this visit by Provider                ROS:  GEN: negative for fever, fatigue, weight change. Has gained weight from age 20s on.  HEENT: negative for vision changes, eye irritation, ear pain, nasal congestion, rhinorrhea, sore throat or teeth pain. Has scheduled eye exam and will schedule dental exam.   NECK: negative for pain stiffness  RESP: negative for SOB, has occasional productive cough. Denies, wheeze  CV: negative for chest pain, irregular heart beat, peripheral edema  GI: negative for nausea or vomiting, abdominal pain, heartburn, stool pattern change, constipation or diarrhea (only episode was after restarting metformin)  : negative for dysuria, urgency, frequency, trouble starting stream. Denies risk for STI, no sexual IC in 1year. Denies symptoms.   MSK: negative for bone or joint pain or stiffness. Worried about his posture which he thinks has gotten worse. Wonders if his spine is changing.   NEURO: negative for headache, dizziness. Has some numbness, tingling in toes and feet bilaterally; denies weakness  ENDO: negative for increased thirst or urination  "currently. Foot tingling as above. Denies temperature intolerance  HEME: negative for bruising or bleeding  DERM: history of eczema. Has rash on trunk and arms most of the time. Gets cream to put on it sometimes. Not itchy currently.   MOOD: depressed mood and anxiety. Some improvement since resumed meds.       OBJECTIVE:   BP (!) 125/97   Pulse 105   Temp 98.7  F (37.1  C)   Ht 1.671 m (5' 5.8\")   Wt 99.4 kg (219 lb 3.2 oz)   SpO2 95%   BMI 35.60 kg/m     Vital signs reviewed. DBP is elevated.  HR is rapid.   EXAM:  GENERAL: healthy, alert and no distress  EYES: Eyes grossly normal to inspection, PERRL and conjunctivae and sclerae normal  HENT: TMs gray with LR. nose and mouth without ulcers or lesions, nasal mucosa edematous , rhinorrhea yellow, oropharynx clear and oral mucous membranes moist  NECK: no adenopathy, no asymmetry, masses, or scars and thyroid normal to palpation  RESP: lungs clear to auscultation - no rales, rhonchi or wheezes  CV: regular rate and rhythm, normal S1 S2, no S3 or S4, no murmur, click or rub, no peripheral edema and peripheral pulses strong  ABDOMEN: soft, nontender, no hepatosplenomegaly, no masses and bowel sounds normal   (male): normal male genitalia without lesions or urethral discharge, no hernia  MS: no gross musculoskeletal defects noted, no edema  MS: full spinal ROM no curvature noted.   SKIN: fine erythematous papules lateral aspect upper arms and back. Few scattered cherry hemangiomas trunk.   NEURO: Normal strength and tone, mentation intact and speech normal  NEURO: Normal strength and tone, sensory exam grossly normal, mentation intact and sensation diminished feet.   PSYCH: mentation appears normal, affect normal/bright  LYMPH: no cervical, supraclavicular, axillary, or inguinal adenopathy  Diabetic foot exam: normal DP and PT pulses, no trophic changes or ulcerative lesions and done last week visit    Diagnostic Test Results:  Labs reviewed in Epic  Results " "for orders placed or performed in visit on 08/17/21 (from the past 24 hour(s))   Hemoglobin A1c   Result Value Ref Range    Hemoglobin A1C 6.9 %       ASSESSMENT/PLAN:   1. Health Maintenance exam  To follow through with eye exam. Schedule dental exam. Labs below.   Reviewed healthy diet and exercise. Recommend weight loss    2. Type 2 diabetes mellitus without complication, without long-term current use of insulin (H)  Requested Pharm D review meds with patient also. Discussed lisinopril addition re: BP and for renal. Will follow up 1 month and recheck BP. Continue current meds for now. Reviewed healthy diet, exercise and weight loss.   - Hemoglobin A1c; Future  - Lipid panel reflex to direct LDL Fasting; Future  - TSH with free T4 reflex; Future  - CBC with platelets; Future  - Comprehensive metabolic panel; Future  - Hemoglobin A1c  - Lipid panel reflex to direct LDL Fasting  - TSH with free T4 reflex  - CBC with platelets  - Comprehensive metabolic panel    2. Snoring  Body habitus increases risk for UNIQUE. Referred for sleep study.   - SLEEP EVALUATION & MANAGEMENT REFERRAL - ADULT -; Future    3. Class 2 severe obesity with serious comorbidity and body mass index (BMI) of 35.0 to 35.9 in adult, unspecified obesity type (H)    - SLEEP EVALUATION & MANAGEMENT REFERRAL - ADULT -; Future      Patient has been advised of split billing requirements and indicates understanding: Yes  COUNSELING:  Reviewed preventive health counseling, as reflected in patient instructions       Regular exercise       Healthy diet/nutrition       Vision screening       Safe sex practices/STD prevention    Estimated body mass index is 35.6 kg/m  as calculated from the following:    Height as of this encounter: 1.671 m (5' 5.8\").    Weight as of this encounter: 99.4 kg (219 lb 3.2 oz).    Weight management plan: Discussed healthy diet and exercise guidelines    He reports that he quit smoking about 19 months ago. His smoking use included " cigarettes. He smoked 0.00 packs per day for 4.00 years. His smokeless tobacco use includes chew.      Counseling Resources:  ATP IV Guidelines  Pooled Cohorts Equation Calculator  FRAX Risk Assessment  ICSI Preventive Guidelines  Dietary Guidelines for Americans, 2010  USDA's MyPlate  ASA Prophylaxis  Lung CA Screening    JUAN ALBERTO Moncada CNP  [facility] [employer] [employer]

## 2021-08-17 NOTE — NURSING NOTE
"ROOM:3    Preferred Name: Pedro     31 year old  Chief Complaint   Patient presents with     Physical     Pt wanting a physical and to complete labs       Blood pressure (!) 125/97, pulse 105, temperature 98.7  F (37.1  C), height 1.671 m (5' 5.8\"), weight 99.4 kg (219 lb 3.2 oz), SpO2 95 %. Body mass index is 35.6 kg/m .  BP completed using cuff size:    There is no problem list on file for this patient.      Wt Readings from Last 2 Encounters:   21 99.4 kg (219 lb 3.2 oz)   08/10/21 103 kg (227 lb 1.6 oz)     BP Readings from Last 3 Encounters:   21 (!) 125/97   08/10/21 127/86   19 140/70       No Known Allergies    Current Outpatient Medications   Medication     blood glucose (NO BRAND SPECIFIED) test strip     blood glucose monitoring (ACCU-CHEK FERMIN PLUS) meter device kit     blood glucose monitoring (ACCU-CHEK MULTICLIX) lancets     buPROPion (WELLBUTRIN SR) 150 MG 12 hr tablet     hydrOXYzine (ATARAX) 25 MG tablet     metFORMIN (GLUCOPHAGE) 1000 MG tablet     No current facility-administered medications for this visit.       Social History     Tobacco Use     Smoking status: Former Smoker     Packs/day: 0.00     Years: 4.00     Pack years: 0.00     Types: Cigarettes     Quit date:      Years since quittin.6     Smokeless tobacco: Current User     Types: Chew   Substance Use Topics     Alcohol use: No     Drug use: No       Honoring Choices - Health Care Directive Guide offered to patient at time of visit.    Health Maintenance Due   Topic Date Due     PREVENTIVE CARE VISIT  Never done     A1C  Never done     BMP  Never done     LIPID  Never done     MICROALBUMIN  Never done     ADVANCE CARE PLANNING  Never done     EYE EXAM  Never done     Pneumococcal Vaccine: Pediatrics (0 to 5 Years) and At-Risk Patients (6 to 64 Years) (1 of 2 - PPSV23) Never done     HIV SCREENING  Never done     HEPATITIS C SCREENING  Never done     HEPATITIS B IMMUNIZATION (1 of 3 - Risk 3-dose series) Never " done       Immunization History   Administered Date(s) Administered     TDAP Vaccine (Adacel) 03/22/2019       No results found for: PAP    No lab results found.    No flowsheet data found.    PHQ-9 SCORE 8/10/2021   PHQ-9 Total Score 13       REBA-7 SCORE 8/10/2021   Total Score 16       No flowsheet data found.    Sunshine Lerma    August 17, 2021 2:34 PM

## 2021-08-19 DIAGNOSIS — E11.9 TYPE 2 DIABETES MELLITUS WITHOUT COMPLICATION, WITHOUT LONG-TERM CURRENT USE OF INSULIN (H): ICD-10-CM

## 2021-08-20 NOTE — TELEPHONE ENCOUNTER
blood glucose monitoring (ACCU-CHEK MULTICLIX) lancets  Last Written Prescription Date:  8/10/2021  Last Fill Quantity: 102,   # refills: PRN  Last Office Visit : 8/17/2021  Future Office visit:  9/14/2021    Routing refill request to provider for review/approval because:  Pharmacy requesting new directions for this med.  Please see request from pharmacy for insurance purposes.        Tamra Varma RN  Central Triage Red Flags/Med Refills

## 2021-08-24 ENCOUNTER — TELEPHONE (OUTPATIENT)
Dept: FAMILY MEDICINE | Facility: CLINIC | Age: 31
End: 2021-08-24

## 2021-08-24 DIAGNOSIS — E11.9 TYPE 2 DIABETES MELLITUS WITHOUT COMPLICATION, WITHOUT LONG-TERM CURRENT USE OF INSULIN (H): ICD-10-CM

## 2021-08-25 NOTE — TELEPHONE ENCOUNTER
Per pharmacy note medicare Part B will not cover more than 1 lancet per day for non-insulin dependent patients. Requesting new prescription for 1 lancet daily with NPI/QTY/DX CODE.    Pended as requested for signature.  The lancets are approx $13 for box of 102

## 2021-09-01 NOTE — TELEPHONE ENCOUNTER
Patient also needs directions for the test strips updated to once a day as that's what his insurance covers

## 2021-09-02 DIAGNOSIS — F41.9 ANXIETY: ICD-10-CM

## 2021-09-03 DIAGNOSIS — E11.9 TYPE 2 DIABETES MELLITUS WITHOUT COMPLICATION, WITHOUT LONG-TERM CURRENT USE OF INSULIN (H): ICD-10-CM

## 2021-09-03 RX ORDER — BLOOD-GLUCOSE METER
EACH MISCELLANEOUS
Qty: 1 KIT | Refills: 0 | Status: SHIPPED | OUTPATIENT
Start: 2021-09-03 | End: 2022-06-14

## 2021-09-07 ENCOUNTER — OFFICE VISIT (OUTPATIENT)
Dept: FAMILY MEDICINE | Facility: CLINIC | Age: 31
End: 2021-09-07
Payer: COMMERCIAL

## 2021-09-07 VITALS
HEIGHT: 66 IN | OXYGEN SATURATION: 95 % | HEART RATE: 109 BPM | BODY MASS INDEX: 35.84 KG/M2 | TEMPERATURE: 98.3 F | SYSTOLIC BLOOD PRESSURE: 127 MMHG | DIASTOLIC BLOOD PRESSURE: 90 MMHG | WEIGHT: 223 LBS

## 2021-09-07 DIAGNOSIS — Z02.89 ENCOUNTER FOR COMPLETION OF FORM WITH PATIENT: Primary | ICD-10-CM

## 2021-09-07 RX ORDER — HYDROXYZINE HYDROCHLORIDE 25 MG/1
25 TABLET, FILM COATED ORAL 3 TIMES DAILY PRN
Qty: 90 TABLET | Refills: 1 | OUTPATIENT
Start: 2021-09-07

## 2021-09-07 ASSESSMENT — PATIENT HEALTH QUESTIONNAIRE - PHQ9
5. POOR APPETITE OR OVEREATING: SEVERAL DAYS
SUM OF ALL RESPONSES TO PHQ QUESTIONS 1-9: 2

## 2021-09-07 ASSESSMENT — ANXIETY QUESTIONNAIRES
3. WORRYING TOO MUCH ABOUT DIFFERENT THINGS: NOT AT ALL
5. BEING SO RESTLESS THAT IT IS HARD TO SIT STILL: NOT AT ALL
2. NOT BEING ABLE TO STOP OR CONTROL WORRYING: NOT AT ALL
6. BECOMING EASILY ANNOYED OR IRRITABLE: NOT AT ALL
GAD7 TOTAL SCORE: 2
1. FEELING NERVOUS, ANXIOUS, OR ON EDGE: SEVERAL DAYS
7. FEELING AFRAID AS IF SOMETHING AWFUL MIGHT HAPPEN: NOT AT ALL

## 2021-09-07 ASSESSMENT — MIFFLIN-ST. JEOR: SCORE: 1896.1

## 2021-09-07 NOTE — PROGRESS NOTES
"       HPI       Pedro Funez is a 31 year old  who presents for   Chief Complaint   Patient presents with     Forms   31 year-old male here for form completion: Professional statement of need for housing.    DM: feels BS has improved, although has not yet picked up blood test strips. Has meter and lancets. Will  today.  Thinks urination has decreased.  Trying to improve diet.   Feeling better being in stable housing.       Problem, Medication and Allergy Lists were reviewed and updated if needed..    Patient is an established patient of this clinic..         Review of Systems:   Review of Systems  GEN: denies malaise. States feels well  ENDO: has per HPI  MOOD: Doing well on meds. PHQ9 and GAD7 reduced.        Physical Exam:     Vitals:    09/07/21 1047   BP: (!) 127/90   Pulse: 109   Temp: 98.3  F (36.8  C)   TempSrc: Oral   SpO2: 95%   Weight: 101.2 kg (223 lb)   Height: 1.671 m (5' 5.8\")     Body mass index is 36.21 kg/m .  Vital signs normal except BP borderline. Recheck was 123/83.     Physical Exam  GEN: alert male in NAD  MOOD: upbeat. Alert, appropriate to questions  Rest of PE deferred.     Results:   No testing ordered today    Assessment and Plan          1. Encounter for completion of form with patient  Professional statement of need form completed for housing. See scanned copy.   Requested patient obtain blood sugar test strips and check daily fasting am BS or obtain a few evening BS so we can follow on control. Reinforced taking meds as prescribed. Reinforced health diet. Last A1c was 6.9 8/17/2021 Plan 3 month follow up 11/2021   No results for random protein: cr in chart, will pursue status.   There are no discontinued medications.    Options for treatment and follow-up care were reviewed with the patient. Pedro Funez  engaged in the decision making process and verbalized understanding of the options discussed and agreed with the final plan.    JUAN ALBERTO Moncada CNP  "

## 2021-09-07 NOTE — NURSING NOTE
"ROOM:1    Preferred Name: Pedro     31 year old  Chief Complaint   Patient presents with     Forms       Blood pressure (!) 127/90, pulse 109, temperature 98.3  F (36.8  C), temperature source Oral, height 1.671 m (5' 5.8\"), weight 101.2 kg (223 lb), SpO2 95 %. Body mass index is 36.21 kg/m .      Patient Active Problem List   Diagnosis     Diabetes mellitus, type 2 (H)     Morbid obesity (H)       Wt Readings from Last 2 Encounters:   21 101.2 kg (223 lb)   21 99.4 kg (219 lb 3.2 oz)     BP Readings from Last 3 Encounters:   21 (!) 127/90   21 (!) 125/97   08/10/21 127/86       No Known Allergies    Current Outpatient Medications   Medication     blood glucose (NO BRAND SPECIFIED) test strip     blood glucose monitoring (ACCU-CHEK FERMIN PLUS) meter device kit     blood glucose monitoring (ACCU-CHEK MULTICLIX) lancets     buPROPion (WELLBUTRIN SR) 150 MG 12 hr tablet     hydrOXYzine (ATARAX) 25 MG tablet     metFORMIN (GLUCOPHAGE) 1000 MG tablet     No current facility-administered medications for this visit.       Social History     Tobacco Use     Smoking status: Former Smoker     Packs/day: 0.00     Years: 4.00     Pack years: 0.00     Types: Cigarettes     Quit date:      Years since quittin.6     Smokeless tobacco: Current User     Types: Chew   Vaping Use     Vaping Use: Never used   Substance Use Topics     Alcohol use: No     Drug use: No       Honoring Choices - Health Care Directive Guide offered to patient at time of visit.    Health Maintenance Due   Topic Date Due     MICROALBUMIN  Never done     ADVANCE CARE PLANNING  Never done     EYE EXAM  Never done     Pneumococcal Vaccine: Pediatrics (0 to 5 Years) and At-Risk Patients (6 to 64 Years) (1 of 2 - PPSV23) Never done     HIV SCREENING  Never done     HEPATITIS C SCREENING  Never done     HEPATITIS B IMMUNIZATION (1 of 3 - Risk 3-dose series) Never done     INFLUENZA VACCINE (1) 2021       Immunization History "   Administered Date(s) Administered     TDAP Vaccine (Adacel) 03/22/2019       No results found for: PAP      Recent Labs   Lab Test 08/17/21  1452   A1C 6.9   *   HDL 42   TRIG 119   ALT 40   CR 0.75   GFRESTIMATED >90   ALBUMIN 4.0   POTASSIUM 4.1   TSH 1.39       PHQ-2 ( 1999 Pfizer) 9/7/2021   Q1: Little interest or pleasure in doing things 0   Q2: Feeling down, depressed or hopeless 0   PHQ-2 Score 0       PHQ-9 SCORE 8/10/2021   PHQ-9 Total Score 13       REBA-7 SCORE 8/10/2021   Total Score 16       No flowsheet data found.      Chanel Lerma CMA  September 7, 2021 10:49 AM

## 2021-09-08 ASSESSMENT — ANXIETY QUESTIONNAIRES: GAD7 TOTAL SCORE: 2

## 2021-09-14 ENCOUNTER — OFFICE VISIT (OUTPATIENT)
Dept: FAMILY MEDICINE | Facility: CLINIC | Age: 31
End: 2021-09-14
Payer: COMMERCIAL

## 2021-09-14 VITALS
HEIGHT: 65 IN | SYSTOLIC BLOOD PRESSURE: 128 MMHG | OXYGEN SATURATION: 93 % | BODY MASS INDEX: 36.72 KG/M2 | WEIGHT: 220.4 LBS | HEART RATE: 103 BPM | TEMPERATURE: 98.5 F | DIASTOLIC BLOOD PRESSURE: 86 MMHG

## 2021-09-14 DIAGNOSIS — I10 BENIGN ESSENTIAL HYPERTENSION: ICD-10-CM

## 2021-09-14 DIAGNOSIS — F41.8 MIXED ANXIETY AND DEPRESSIVE DISORDER: Primary | ICD-10-CM

## 2021-09-14 RX ORDER — LISINOPRIL 5 MG/1
5 TABLET ORAL DAILY
Qty: 30 TABLET | Refills: 2 | Status: SHIPPED | OUTPATIENT
Start: 2021-09-14 | End: 2021-12-14

## 2021-09-14 RX ORDER — BUPROPION HYDROCHLORIDE 150 MG/1
150 TABLET, EXTENDED RELEASE ORAL 2 TIMES DAILY
Qty: 180 TABLET | Refills: 0 | Status: SHIPPED | OUTPATIENT
Start: 2021-09-14 | End: 2021-12-14

## 2021-09-14 ASSESSMENT — MIFFLIN-ST. JEOR: SCORE: 1868.43

## 2021-09-14 NOTE — NURSING NOTE
"ROOM:1    Preferred Name: Pedro     31 year old  Chief Complaint   Patient presents with     Hypertension     Pt following up on blood pressure.     Medication Refill     Wellbutrin       Blood pressure 128/86, pulse 103, temperature 98.5  F (36.9  C), temperature source Oral, height 1.646 m (5' 4.8\"), weight 100 kg (220 lb 6.4 oz), SpO2 93 %. Body mass index is 36.9 kg/m .  BP completed using cuff size:    Patient Active Problem List   Diagnosis     Diabetes mellitus, type 2 (H)     Morbid obesity (H)       Wt Readings from Last 2 Encounters:   21 100 kg (220 lb 6.4 oz)   21 101.2 kg (223 lb)     BP Readings from Last 3 Encounters:   21 128/86   21 (!) 127/90   21 (!) 125/97       No Known Allergies    Current Outpatient Medications   Medication     blood glucose (NO BRAND SPECIFIED) test strip     blood glucose monitoring (ACCU-CHEK FERMIN PLUS) meter device kit     blood glucose monitoring (ACCU-CHEK MULTICLIX) lancets     buPROPion (WELLBUTRIN SR) 150 MG 12 hr tablet     hydrOXYzine (ATARAX) 25 MG tablet     metFORMIN (GLUCOPHAGE) 1000 MG tablet     No current facility-administered medications for this visit.       Social History     Tobacco Use     Smoking status: Former Smoker     Packs/day: 0.00     Years: 4.00     Pack years: 0.00     Types: Cigarettes     Quit date:      Years since quittin.7     Smokeless tobacco: Current User     Types: Chew   Vaping Use     Vaping Use: Never used   Substance Use Topics     Alcohol use: No     Drug use: No       Honoring Choices - Health Care Directive Guide offered to patient at time of visit.    Health Maintenance Due   Topic Date Due     MICROALBUMIN  Never done     ADVANCE CARE PLANNING  Never done     EYE EXAM  Never done     Pneumococcal Vaccine: Pediatrics (0 to 5 Years) and At-Risk Patients (6 to 64 Years) (1 of 2 - PPSV23) Never done     HIV SCREENING  Never done     HEPATITIS C SCREENING  Never done     HEPATITIS B " IMMUNIZATION (1 of 3 - Risk 3-dose series) Never done     INFLUENZA VACCINE (1) 09/01/2021       Immunization History   Administered Date(s) Administered     COVID-19,PF,Miah 05/08/2021     TDAP Vaccine (Adacel) 03/22/2019       No results found for: PAP    Recent Labs   Lab Test 08/17/21  1452   A1C 6.9   *   HDL 42   TRIG 119   ALT 40   CR 0.75   GFRESTIMATED >90   ALBUMIN 4.0   POTASSIUM 4.1   TSH 1.39       PHQ-2 ( 1999 Pfizer) 9/7/2021   Q1: Little interest or pleasure in doing things 0   Q2: Feeling down, depressed or hopeless 0   PHQ-2 Score 0       PHQ-9 SCORE 8/10/2021 9/7/2021   PHQ-9 Total Score 13 2       REBA-7 SCORE 8/10/2021 9/7/2021   Total Score 16 2       No flowsheet data found.    Sunshine Lerma    September 14, 2021 3:25 PM

## 2021-09-14 NOTE — PROGRESS NOTES
"       HPI       Pedro Denson Armin is a 31 year old  who presents for   Chief Complaint   Patient presents with     Hypertension     Pt following up on blood pressure.     Medication Refill     Wellbutrin     Patient is a 30 y/o male who presents for follow up of blood pressure and Wellbutrin refill. Initially seen August 2021 and restarted on DM meds after > 1 year hiatus.    1. Elevated BP: BP has been mildly elevated 120's/80-90's the last few visits, remains slightly elevated today. Denies headache, vision changes, chest pain, palpitations, or peripheral edema. His last eye exam was 10 years ago per patient, but has an appointment scheduled for 9/30.   2. DM Type 2: He was recently restarted on Metformin for DM2. Tolerating well. He has been monitoring his fasting blood glucose daily at home ranging from . Denies dizziness, light headedness, shakiness.   3. Wellbutrin refill: Reports depression and anxiety; mood is stable. Both anxiety and depression reduced. Did not sleep well one night this week, but overall no concerns with sleeping. Requesting refill today.      Problem, Medication and Allergy Lists were reviewed and updated if needed..    Patient is an established patient of this clinic. Updated as needed.         Review of Systems:   Review of Systems    GEN: Denies fatigue. Reports ~20lb weight loss over the last 2 years  NEURO: Denies numbness or tingling. Denies dizziness, lightheadedness.   HEENT: Denies blurry vision or vision changes  CV: Denies chest pain, palpitations, peripheral edema.  RESP: Denies shortness of breath, cough  MSK: denies foot problems             Physical Exam:     Vitals:    09/14/21 1522   BP: 128/86   Pulse: 103   Temp: 98.5  F (36.9  C)   TempSrc: Oral   SpO2: 93%   Weight: 100 kg (220 lb 6.4 oz)   Height: 1.646 m (5' 4.8\")     Body mass index is 36.9 kg/m .  Vital signs normal except BP slightly elevated. HR remains >100     Physical Exam   GEN: Alert and oriented " male in no acute distress. Answers questions appropriately.  CV: HRRR with no murmurs, clicks, or rubs. No peripheral edema.  RESP: lung sounds clear and equal bilaterally, no crackles, rhonchi, or wheezes   MOOD: appropriate to situation. Normal mood and affect.      Results:   No testing ordered today    Assessment and Plan        1. Mixed anxiety and depressive disorder  Mood is stable, will refill at current dose. To seek care if changes. Otherwise follow up in 3 months  - buPROPion (WELLBUTRIN SR) 150 MG 12 hr tablet; Take 1 tablet (150 mg) by mouth 2 times daily  Dispense: 180 tablet; Refill: 0    2. Benign essential hypertension  Given comorbid DM, will add low dose lisinopril, more for renal protective effect. Reviewed use and side effect of medications. Cautioned against getting dehydrated and reviewed signs and symptoms of low BP. To notify me if experiences cough or low BP.  Get up slowly. May take in evening.   - lisinopril (ZESTRIL) 5 MG tablet; Take 1 tablet (5 mg) by mouth daily  Dispense: 30 tablet; Refill: 2    3. DM Type 2.   Plan follow up, recheck A1c in 2.5 months.      Medications Discontinued During This Encounter   Medication Reason     buPROPion (WELLBUTRIN SR) 150 MG 12 hr tablet Reorder       Options for treatment and follow-up care were reviewed with the patient. Pedro Funez  engaged in the decision making process and verbalized understanding of the options discussed and agreed with the final plan.    Matilde Bravo RN    I was present with Matilde [employer] student who participated in the service and in the documentation of the services provided. I have verified the history and personally performed the physical exam and medical decision making, as documented by the student and edited by me    JUAN ALBERTO Moncada CNP  09/14/21  4:38 PM        JUAN ALBERTO Moncada CNP

## 2021-09-14 NOTE — PATIENT INSTRUCTIONS
Blood pressure is OK. Goal is less than 130/80-90  Starting very low dose of lisinopril (5mg every day -- may take at night) mostly for kidney protection. Might cause dry cough. Let me know if you develop cough or feel dizzy like you might faint. That would mean your blood pressure is getting too low.     Blood sugar range .  If you feel drowsy, sweaty, confused, check your blood sugar  Carry hard candy    Follow up in 2 1/2 months for A1c and recheck    Refilled your wellbutrin

## 2021-09-30 ENCOUNTER — OFFICE VISIT (OUTPATIENT)
Dept: OPHTHALMOLOGY | Facility: CLINIC | Age: 31
End: 2021-09-30
Attending: NURSE PRACTITIONER
Payer: COMMERCIAL

## 2021-09-30 VITALS — BODY MASS INDEX: 36.65 KG/M2 | WEIGHT: 220 LBS | HEIGHT: 65 IN

## 2021-09-30 DIAGNOSIS — H52.223 REGULAR ASTIGMATISM OF BOTH EYES: ICD-10-CM

## 2021-09-30 DIAGNOSIS — H53.8 BLURRED VISION: ICD-10-CM

## 2021-09-30 DIAGNOSIS — E11.9 TYPE 2 DIABETES MELLITUS WITHOUT COMPLICATION, WITHOUT LONG-TERM CURRENT USE OF INSULIN (H): Primary | ICD-10-CM

## 2021-09-30 PROCEDURE — 92015 DETERMINE REFRACTIVE STATE: CPT | Performed by: OPTOMETRIST

## 2021-09-30 PROCEDURE — 92004 COMPRE OPH EXAM NEW PT 1/>: CPT | Performed by: OPTOMETRIST

## 2021-09-30 RX ORDER — LISINOPRIL 5 MG/1
5 TABLET ORAL DAILY
COMMUNITY
End: 2021-11-04

## 2021-09-30 ASSESSMENT — REFRACTION_MANIFEST
OS_CYLINDER: +1.00
OD_CYLINDER: +0.75
OD_AXIS: 017
OS_SPHERE: -1.50
OD_SPHERE: -0.25
OS_AXIS: 160

## 2021-09-30 ASSESSMENT — TONOMETRY
OD_IOP_MMHG: 15
OS_IOP_MMHG: 17
IOP_METHOD: ICARE

## 2021-09-30 ASSESSMENT — EXTERNAL EXAM - RIGHT EYE: OD_EXAM: NORMAL

## 2021-09-30 ASSESSMENT — VISUAL ACUITY
OS_PH_SC: 20/30
OD_PH_SC+: -3
OD_PH_SC: 20/20
OD_SC: 20/30
OS_SC: 20/50
METHOD: SNELLEN - LINEAR

## 2021-09-30 ASSESSMENT — CONF VISUAL FIELD
METHOD: COUNTING FINGERS
OD_NORMAL: 1
OS_NORMAL: 1

## 2021-09-30 ASSESSMENT — SLIT LAMP EXAM - LIDS
COMMENTS: NORMAL
COMMENTS: NORMAL

## 2021-09-30 ASSESSMENT — MIFFLIN-ST. JEOR: SCORE: 1869.79

## 2021-09-30 ASSESSMENT — CUP TO DISC RATIO
OS_RATIO: 0.25
OD_RATIO: 0.25

## 2021-09-30 ASSESSMENT — EXTERNAL EXAM - LEFT EYE: OS_EXAM: NORMAL

## 2021-09-30 NOTE — NURSING NOTE
Chief Complaints and History of Present Illnesses   Patient presents with     COMPREHENSIVE EYE EXAM     Diabetic      Chief Complaint(s) and History of Present Illness(es)     COMPREHENSIVE EYE EXAM     Laterality: both eyes    Associated symptoms: Negative for eye pain, redness, tearing and dryness    Pain scale: 0/10    Comments: Diabetic               Comments     Diabetes for the past 5+ years sugars have been pretty stable. Lost glasses and has been without for the past 4 years. Things are not distinct at distance with glasses each eye. No issues near.       .Lab Results       Component                Value               Date                       A1C                      6.9                 08/17/2021            ALENA Serrano COT 2:00 PM September 30, 2021

## 2021-09-30 NOTE — PROGRESS NOTES
A/P  1.) Type 2 DM without ophthalmic manifestation each eye  -Last A1c 6.9, no known history of diabetic retinopathy  -Reviewed effects of DM on the eyes and importance of good blood sugar control  -Monitor annually with dilation    2.) Astigmatism left eye>right eye  -No current glasses wear, good vision with updated Rx  -Okay to drive without glasses but would recommend he wear them especially at night    Monitor 1 year diabetic eye exam    I have confirmed the patient's CC, HPI and reviewed Past Medical History, Past Surgical History, Social History, Family History, Problem List, Medication List and agree with Tech note.     Merari Faustin, ADAN FAAO FSLS

## 2021-10-05 ENCOUNTER — VIRTUAL VISIT (OUTPATIENT)
Dept: SLEEP MEDICINE | Facility: CLINIC | Age: 31
End: 2021-10-05
Attending: NURSE PRACTITIONER
Payer: COMMERCIAL

## 2021-10-05 DIAGNOSIS — E66.01 CLASS 2 SEVERE OBESITY WITH SERIOUS COMORBIDITY AND BODY MASS INDEX (BMI) OF 35.0 TO 35.9 IN ADULT, UNSPECIFIED OBESITY TYPE (H): ICD-10-CM

## 2021-10-05 DIAGNOSIS — E66.812 CLASS 2 SEVERE OBESITY WITH SERIOUS COMORBIDITY AND BODY MASS INDEX (BMI) OF 35.0 TO 35.9 IN ADULT, UNSPECIFIED OBESITY TYPE (H): ICD-10-CM

## 2021-10-05 DIAGNOSIS — R06.83 SNORING: ICD-10-CM

## 2021-10-05 PROBLEM — F41.1 GAD (GENERALIZED ANXIETY DISORDER): Status: ACTIVE | Noted: 2018-06-21

## 2021-10-05 PROBLEM — F33.1 MDD (MAJOR DEPRESSIVE DISORDER), RECURRENT EPISODE, MODERATE (H): Status: ACTIVE | Noted: 2018-10-10

## 2021-10-05 PROBLEM — F84.0 AUTISM SPECTRUM DISORDER: Status: ACTIVE | Noted: 2018-06-21

## 2021-10-05 PROCEDURE — 99205 OFFICE O/P NEW HI 60 MIN: CPT | Mod: 95 | Performed by: INTERNAL MEDICINE

## 2021-10-05 NOTE — PATIENT INSTRUCTIONS
"      MY TREATMENT INFORMATION FOR SLEEP APNEA-  Pedro Funez    DOCTOR : Luther Maddox MD    Am I having a sleep study at a sleep center?  --->Due to insurance clearance delays, you will be contacted within 2-4 weeks to schedule    Am I having a home sleep study?  --->Watch the video for the device you are using:    -/drop off device-   https://www.Trineanube.com/watch?v=yGGFBdELGhk    -Disposable device sent out require phone/computer application-   https://www.I-Stand.com/watch?v=BCce_vbiwxE      Frequently asked questions:  1. What is Obstructive Sleep Apnea (UNIQUE)? UNIQUE is the most common type of sleep apnea. Apnea means, \"without breath.\"  Apnea is most often caused by narrowing or collapse of the upper airway as muscles relax during sleep.   Almost everyone has occasional apneas. Most people with sleep apnea have had brief interruptions at night frequently for many years.  The severity of sleep apnea is related to how frequent and severe the events are.   2. What are the consequences of UNIQUE? Symptoms include: feeling sleepy during the day, snoring loudly, gasping or stopping of breathing, trouble sleeping, and occasionally morning headaches or heartburn at night.  Sleepiness can be serious and even increase the risk of falling asleep while driving. Other health consequences may include development of high blood pressure and other cardiovascular disease in persons who are susceptible. Untreated UNIQUE  can contribute to heart disease, stroke and diabetes.   3. What are the treatment options? In most situations, sleep apnea is a lifelong disease that must be managed with daily therapy. Medications are not effective for sleep apnea and surgery is generally not considered until other therapies have been tried. Your treatment is your choice . Continuous Positive Airway (CPAP) works right away and is the therapy that is effective in nearly everyone. An oral device to hold your jaw forward is usually the next " most reliable option. Other options include postioning devices (to keep you off your back), weight loss, and surgery including a tongue pacing device. There is more detail about some of these options below.  4. Are my sleep studies covered by insurance? Although we will request verification of coverage, we advise you also check in advance of the study to ensure there is coverage.    Important tips for those choosing CPAP and similar devices   Know your equipment:  CPAP is continuous positive airway pressure that prevents obstructive sleep apnea by keeping the throat from collapsing while you are sleeping. In most cases, the device is  smart  and can slowly self-adjusts if your throat collapses and keeps a record every day of how well you are treated-this information is available to you and your care team.  BPAP is bilevel positive airway pressure that keeps your throat open and also assists each breath with a pressure boost to maintain adequate breathing.  Special kinds of BPAP are used in patients who have inadequate breathing from lung or heart disease. In most cases, the device is  smart  and can slowly self-adjusts to assist breathing. Like CPAP, the device keeps a record of how well you are treated.  Your mask is your connection to the device. You get to choose what feels most comfortable and the staff will help to make sure if fits. Here: are some examples of the different masks that are available:       Key points to remember on your journey with sleep apnea:  1. Sleep study.  PAP devices often need to be adjusted during a sleep study to show that they are effective and adjusted right.  2. Good tips to remember: Try wearing just the mask during a quiet time during the day so your body adapts to wearing it. A humidifier is recommended for comfort in most cases to prevent drying of your nose and throat. Allergy medication from your provider may help you if you are having nasal congestion.  3. Getting  settled-in. It takes more than one night for most of us to get used to wearing a mask. Try wearing just the mask during a quiet time during the day so your body adapts to wearing it. A humidifier is recommended for comfort in most cases. Our team will work with you carefully on the first day and will be in contact within 4 days and again at 2 and 4 weeks for advice and remote device adjustments. Your therapy is evaluated by the device each day.   4. Use it every night. The more you are able to sleep naturally for 7-8 hours, the more likely you will have good sleep and to prevent health risks or symptoms from sleep apnea. Even if you use it 4 hours it helps. Occasionally all of us are unable to use a medical therapy, in sleep apnea, it is not dangerous to miss one night.   5. Communicate. Call our skilled team on the number provided on the first day if your visit for problems that make it difficult to wear the device. Over 2 out of 3 patients can learn to wear the device long-term with help from our team. Remember to call our team or your sleep providers if you are unable to wear the device as we may have other solutions for those who cannot adapt to mask CPAP therapy. It is recommended that you sleep your sleep provider within the first 3 months and yearly after that if you are not having problems.   6. Use it for your health. We encourage use of CPAP masks during daytime quiet periods to allow your face and brain to adapt to the sensation of CPAP so that it will be a more natural sensation to awaken to at night or during naps. This can be very useful during the first few weeks or months of adapting to CPAP though it does not help medically to wear CPAP during wakefulness and  should not be used as a strategy just to meet guidelines.  7. Take care of your equipment. Make sure you clean your mask and tubing using directions every day and that your filter and mask are replaced as recommended or if they are not  working.     BESIDES CPAP, WHAT OTHER THERAPIES ARE THERE?    Positioning Device  Positioning devices are generally used when sleep apnea is mild and only occurs on your back.This example shows a pillow that straps around the waist. It may be appropriate for those whose sleep study shows milder sleep apnea that occurs primarily when lying flat on one's back. Preliminary studies have shown benefit but effectiveness at home may need to be verified by a home sleep test. These devices are generally not covered by medical insurance.  Examples of devices that maintain sleeping on the back to prevent snoring and mild sleep apnea.    Belt type body positioner  http://EnSol.SimplyTapp/    Electronic reminder  http://nightshifttherapy.com/  http://www.[employer].SimplyTapp.au/      Oral Appliance  What is oral appliance therapy?  An oral appliance device fits on your teeth at night like a retainer used after having braces. The device is made by a specialized dentist and requires several visits over 1-2 months before a manufactured device is made to fit your teeth and is adjusted to prevent your sleep apnea. Once an oral device is working properly, snoring should be improved. A home sleep test may be recommended at that time if to determine whether the sleep apnea is adequately treated.       Some things to remember:  -Oral devices are often, but not always, covered by your medical insurance. Be sure to check with your insurance provider.   -If you are referred for oral therapy, you will be given a list of specialized dentists to consider or you may choose to visit the Web site of the American Academy of Dental Sleep Medicine  -Oral devices are less likely to work if you have severe sleep apnea or are extremely overweight.     More detailed information  An oral appliance is a small acrylic device that fits over the upper and lower teeth  (similar to a retainer or a mouth guard). This device slightly moves jaw forward, which moves the base  of the tongue forward, opens the airway, improves breathing for effective treat snoring and obstructive sleep apnea in perhaps 7 out of 10 people .  The best working devices are custom-made by a dental device  after a mold is made of the teeth 1, 2, 3.  When is an oral appliance indicated?  Oral appliance therapy is recommended as a first-line treatment for patients with primary snoring, mild sleep apnea, and for patients with moderate sleep apnea who prefer appliance therapy to use of CPAP4, 5. Severity of sleep apnea is determined by sleep testing and is based on the number of respiratory events per hour of sleep.   How successful is oral appliance therapy?  The success rate of oral appliance therapy in patients with mild sleep apnea is 75-80% while in patients with moderate sleep apnea it is 50-70%. The chance of success in patients with severe sleep apnea is 40-50%. The research also shows that oral appliances have a beneficial effect on the cardiovascular health of UNIQUE patients at the same magnitude as CPAP therapy7.  Oral appliances should be a second-line treatment in cases of severe sleep apnea, but if not completely successful then a combination therapy utilizing CPAP plus oral appliance therapy may be effective. Oral appliances tend to be effective in a broad range of patients although studies show that the patients who have the highest success are females, younger patients, those with milder disease, and less severe obesity. 3, 6.   Finding a dentist that practices dental sleep medicine  Specific training is available through the American Academy of Dental Sleep Medicine for dentists interested in working in the field of sleep. To find a dentist who is educated in the field of sleep and the use of oral appliances, near you, visit the Web site of the American Academy of Dental Sleep Medicine.    References  1. elle Jara al. Objectively measured vs self-reported compliance during oral  appliance therapy for sleep-disordered breathing. Chest 2013; 144(5): 1757-6224.  2. Aurea, et al. Objective measurement of compliance during oral appliance therapy for sleep-disordered breathing. Thorax 2013; 68(1): 91-96.  3. Dimitri et al. Mandibular advancement devices in 620 men and women with UNIQUE and snoring: tolerability and predictors of treatment success. Chest 2004; 125: 6221-5936.  4. Prosper, et al. Oral appliances for snoring and UNIQUE: a review. Sleep 2006; 29: 244-262.  5. Bianca et al. Oral appliance treatment for UNIQUE: an update. J Clin Sleep Med 2014; 10(2): 215-227.  6. Sharee et al. Predictors of OSAH treatment outcome. J Dent Res 2007; 86: 4365-1452.      Weight Loss:    Weight loss is a long-term strategy that may improve sleep apnea in some patients.    Weight management is a personal decision and the decision should be based on your interest and the potential benefits.  If you are interested in exploring weight loss strategies, the following discussion covers the impact on weight loss on sleep apnea and the approaches that may be successful.    Being overweight does not necessarily mean you will have health consequences.  Those who have BMI over 35 or over 27 with existing medical conditions carries greater risk.   Weight loss decreases severity of sleep apnea in most people with obesity. For those with mild obesity who have developed snoring with weight gain, even 15-30 pound weight loss can improve and occasionally eliminate sleep apnea.  Structured and life-long dietary and health habits are necessary to lose weight and keep healthier weight levels.     Though there may be significant health benefits from weight loss, long-term weight loss is very difficult to achieve- studies show success with dietary management in less than 10% of people. In addition, substantial weight loss may require years of dietary control and may be difficult if patients have severe obesity. In these  cases, surgical management may be considered.  Finally, older individuals who have tolerated obesity without health complications may be less likely to benefit from weight loss strategies.      [unfilled]    Surgery:    Surgery for obstructive sleep apnea is considered generally only when other therapies fail to work. Surgery may be discussed with you if you are having a difficult time tolerating CPAP and or when there is an abnormal structure that requires surgical correction.  Nose and throat surgeries often enlarge the airway to prevent collapse.  Most of these surgeries create pain for 1-2 weeks and up to half of the most common surgeries are not effective throughout life.  You should carefully discuss the benefits and drawbacks to surgery with your sleep provider and surgeon to determine if it is the best solution for you.   More information  Surgery for UNIQUE is directed at areas that are responsible for narrowing or complete obstruction of the airway during sleep.  There are a wide range of procedures available to enlarge and/or stabilize the airway to prevent blockage of breathing in the three major areas where it can occur: the palate, tongue, and nasal regions.  Successful surgical treatment depends on the accurate identification of the factors responsible for obstructive sleep apnea in each person.  A personalized approach is required because there is no single treatment that works well for everyone.  Because of anatomic variation, consultation with an examination by a sleep surgeon is a critical first step in determining what surgical options are best for each patient.  In some cases, examination during sedation may be recommended in order to guide the selection of procedures.  Patients will be counseled about risks and benefits as well as the typical recovery course after surgery. Surgery is typically not a cure for a person s UNIQUE.  However, surgery will often significantly improve one s UNIQUE severity  (termed  success rate ).  Even in the absence of a cure, surgery will decrease the cardiovascular risk associated with OSA7; improve overall quality of life8 (sleepiness, functionality, sleep quality, etc).      Palate Procedures:  Patients with UNIQUE often have narrowing of their airway in the region of their tonsils and uvula.  The goals of palate procedures are to widen the airway in this region as well as to help the tissues resist collapse.  Modern palate procedure techniques focus on tissue conservation and soft tissue rearrangement, rather than tissue removal.  Often the uvula is preserved in this procedure. Residual sleep apnea is common in patient after pharyngoplasty with an average reduction in sleep apnea events of 33%2.      Tongue Procedures:  ExamWhile patients are awake, the muscles that surround the throat are active and keep this region open for breathing. These muscles relax during sleep, allowing the tongue and other structures to collapse and block breathing.  There are several different tongue procedures available.  Selection of a tongue base procedure depends on characteristics seen on physical exam.  Generally, procedures are aimed at removing bulky tissues in this area or preventing the back of the tongue from falling back during sleep.  Success rates for tongue surgery range from 50-62%3.    Hypoglossal Nerve Stimulation:  Hypoglossal nerve stimulation has recently received approval from the [place] Food and Drug Administration for the treatment of obstructive sleep apnea.  This is based on research showing that the system was safe and effective in treating sleep apnea6.  Results showed that the median AHI score decreased 68%, from 29.3 to 9.0. This therapy uses an implant system that senses breathing patterns and delivers mild stimulation to airway muscles, which keeps the airway open during sleep.  The system consists of three fully implanted components: a small generator (similar in  size to a pacemaker), a breathing sensor, and a stimulation lead.  Using a small handheld remote, a patient turns the therapy on before bed and off upon awakening.    Candidates for this device must be greater than 22 years of age, have moderate to severe UNIQUE (AHI between 20-65), BMI less than 32, have tried CPAP/oral appliance without success, and have appropriate upper airway anatomy (determined by a sleep endoscopy performed by Dr. Draper).    Hypoglossal Nerve Stimulation Pathway:    The sleep surgeon s office will work with the patient through the insurance prior-authorization process (including communications and appeals).    Nasal Procedures:  Nasal obstruction can interfere with nasal breathing during the day and night.  Studies have shown that relief of nasal obstruction can improve the ability of some patients to tolerate positive airway pressure therapy for obstructive sleep apnea1.  Treatment options include medications such as nasal saline, topical corticosteroid and antihistamine sprays, and oral medications such as antihistamines or decongestants. Non-surgical treatments can include external nasal dilators for selected patients. If these are not successful by themselves, surgery can improve the nasal airway either alone or in combination with these other options.      Combination Procedures:  Combination of surgical procedures and other treatments may be recommended, particularly if patients have more than one area of narrowing or persistent positional disease.  The success rate of combination surgery ranges from 66-80%2,3.    References  1. Santiago PRUITT. The Role of the Nose in Snoring and Obstructive Sleep Apnoea: An Update.  Eur Arch Otorhinolaryngol. 2011; 268: 1365-73.  2.  Kim SM; Alisha JA; Yves JR; Pallanch JF; Sebastian ZUÑIGA; Jayde PIMENTEL; Aristeo IVEY. Surgical modifications of the upper airway for obstructive sleep apnea in adults: a systematic review and meta-analysis. SLEEP 2010;33(10):7098-1240.  Jeni AVALOS. Hypopharyngeal surgery in obstructive sleep apnea: an evidence-based medicine review.  Arch Otolaryngol Head Neck Surg. 2006 Feb;132(2):206-13.  3. Jeremy YREYES, Inessa Y, Rocael NORMA. The efficacy of anatomically based multilevel surgery for obstructive sleep apnea. Otolaryngol Head Neck Surg. 2003 Oct;129(4):327-35.  4. Jeni AVALOS, Goldberg A. Hypopharyngeal Surgery in Obstructive Sleep Apnea: An Evidence-Based Medicine Review. Arch Otolaryngol Head Neck Surg. 2006 Feb;132(2):206-13.  5. Kiel PJ et al. Upper-Airway Stimulation for Obstructive Sleep Apnea.  N Engl J Med. 2014 Jan 9;370(2):139-49.  6. Omi Y et al. Increased Incidence of Cardiovascular Disease in Middle-aged Men with Obstructive Sleep Apnea. Am J Respir Crit Care Med; 2002 166: 159-165  7. Joie EM et al. Studying Life Effects and Effectiveness of Palatopharyngoplasty (SLEEP) study: Subjective Outcomes of Isolated Uvulopalatopharyngoplasty. Otolaryngol Head Neck Surg. 2011; 144: 623-631.    Drive Safe... Drive Alive     Sleep health profoundly affects your health, mood, and your safety.  Thirty three percent of the population (one in three of us) is not getting enough sleep and many have a sleep disorder. Not getting enough sleep or having an untreated / undertreated sleep condition may make us sleepy without even knowing it. In fact, our driving could be dramatically impaired due to our sleep health. As your provider, here are some things I would like you to know about driving:     Here are some warning signs for impairment and dangerous drowsy driving:              -Having been awake more than 16 hours               -Looking tired               -Eyelid drooping              -Head nodding (it could be too late at this point)              -Driving for more than 30 minutes     Some things you could do to make the driving safer if you are experiencing some drowsiness:              -Stop driving and rest              -Call for transportation               -Make sure your sleep disorder is adequately treated     Some things that have been shown NOT to work when experiencing drowsiness while driving:              -Turning on the radio              -Opening windows              -Eating any  distracting  /  entertaining  foods (e.g., sunflower seeds, candy, or any other)              -Talking on the phone      Your decision may not only impact your life, but also the life of others. Please, remember to drive safe for yourself and all of us.
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
## 2021-10-06 NOTE — NURSING NOTE
Attempted;unsuccessful left message to call back to schedule PSG and follow up appointment. AVS sent via mailed to patient's home. 10/6/2021 12:27 PM            NADINE Carpenter  [facility] [place]

## 2021-10-07 DIAGNOSIS — I10 BENIGN ESSENTIAL HYPERTENSION: ICD-10-CM

## 2021-10-09 RX ORDER — LISINOPRIL 5 MG/1
TABLET ORAL
Qty: 30 TABLET | Refills: 2 | OUTPATIENT
Start: 2021-10-09

## 2021-11-04 ENCOUNTER — OFFICE VISIT (OUTPATIENT)
Dept: PHARMACY | Facility: CLINIC | Age: 31
End: 2021-11-04
Payer: COMMERCIAL

## 2021-11-04 VITALS
SYSTOLIC BLOOD PRESSURE: 136 MMHG | WEIGHT: 227.8 LBS | OXYGEN SATURATION: 96 % | BODY MASS INDEX: 37.95 KG/M2 | DIASTOLIC BLOOD PRESSURE: 67 MMHG | TEMPERATURE: 98.8 F | HEIGHT: 65 IN | HEART RATE: 115 BPM

## 2021-11-04 DIAGNOSIS — E11.9 DIABETES MELLITUS (H): Primary | ICD-10-CM

## 2021-11-04 ASSESSMENT — MIFFLIN-ST. JEOR: SCORE: 1911.52

## 2021-11-04 NOTE — PROGRESS NOTES
SUBJECTIVE: Pedro is a 31 year old male who was referred by Mirian Lo for [facility] services for medication management.  This clinic received a note that he had not been filling lisinopril.  Therefore, asked patient to come in for appointment to discuss.  He notes that he has a negative amount in his bank account which has made it difficult for him to afford his medications.  eachmedication is between 60 cents and a few dollars, and given his limited income this can be difficult for him to afford at times.    Today, Pedro brings all of this medication bottles to the visit.    HTN: Prescribed lisinopril which still has 2 refills.  He notes that he didn't have the funds the past few weeks to fill this medication. It appears a 30 day supply was last filled 9/14/21 .  He will plan to take this medication if he can pick it up (and thinks he can pick it up if it is no co pay, see below).    Diabetes: Prescribed metformin. He is confident this medication helps.His grandpa has diabetes and also takes metformin.  Filled 8/10/21 (3 month supply, bottle still has many pills in it). He has a glucometer at home and he can check his blood sugars. He feels that as long as he is taking his medication, his A1c is at goal. When he has checked his blood sugar, he has seen that it is 110-120. One time was in the 150s.    Depression: Prescribed wellbutrin.  He states that he knows he should be taking this more. Filled 9/14/21(1 month supply, pills still left in bottle)    Anxiety: Take hydroxyzine.However, he prefers to work on his anxiety without medication. He is interested in not taking medication for anxiety, if at all possible (but is open to his other medications)    Environmental factors that impact patient: Notes that he is scatterbrained and has a lot of different things going on with navigating paperwork for different government programs, etc.       Pedro reports he has two pill boxes, but hasn't started to use them yet.  He is  "planning to use two pill boxes and will set them up to follow his schedule.      Patient Active Problem List   Diagnosis     Diabetes mellitus, type 2 (H)     Morbid obesity (H)     MDD (major depressive disorder), recurrent episode, moderate (H)     Intertrigo     GERD (gastroesophageal reflux disease)     REBA (generalized anxiety disorder)     Essential hypertension     Autism spectrum disorder        OBJECTIVE:     REBA-7 SCORE 8/10/2021 9/7/2021   Total Score 16 2       PHQ-9 SCORE 8/10/2021 9/7/2021   PHQ-9 Total Score 13 2         VITALS:  BP Readings from Last 3 Encounters:   11/04/21 136/67   09/14/21 128/86   09/07/21 (!) 127/90           Weight:   Wt Readings from Last 1 Encounters:   11/04/21 103.3 kg (227 lb 12.8 oz)       Height:   Ht Readings from Last 1 Encounters:   11/04/21 1.661 m (5' 5.4\")       LABORATORY VALUES:   Last A1C:    Lab Results   Component Value Date    A1C 6.9 08/17/2021   .    Last Basic Metabolic Panel:  Lab Results   Component Value Date     08/17/2021      Lab Results   Component Value Date    POTASSIUM 4.1 08/17/2021     Lab Results   Component Value Date    CHLORIDE 105 08/17/2021     Lab Results   Component Value Date    RODY 8.9 08/17/2021     Lab Results   Component Value Date    CO2 24 08/17/2021     Lab Results   Component Value Date    BUN 12 08/17/2021     Lab Results   Component Value Date    CR 0.75 08/17/2021     Lab Results   Component Value Date     08/17/2021       Lipid Panel Labs  Lab Results   Component Value Date    CHOL 190 08/17/2021     Lab Results   Component Value Date    TRIG 119 08/17/2021     Lab Results   Component Value Date    HDL 42 08/17/2021     Lab Results   Component Value Date     08/17/2021       Hepatic Panel Labs  Lab Results   Component Value Date    AST 23 08/17/2021     Lab Results   Component Value Date    ALT 40 08/17/2021         SOCIAL AND FAMILY HISTORY  Social History     Tobacco Use     Smoking status: Former " Smoker     Packs/day: 0.00     Years: 4.00     Pack years: 0.00     Types: Cigarettes     Quit date:      Years since quittin.8     Smokeless tobacco: Current User     Types: Chew   Substance Use Topics     Alcohol use: No    .  Most Recent Immunizations   Administered Date(s) Administered     OSMEL GOVEA Janssen 2021     TDAP Vaccine (Adacel) 2019       CURRENT MEDICATIONS:   Current Outpatient Medications   Medication Sig Dispense Refill     blood glucose (NO BRAND SPECIFIED) test strip Use to test blood sugar 1 time daily or as directed. 200 strip 3     blood glucose monitoring (ACCU-CHEK FERMIN PLUS) meter device kit Use to test blood sugars 1time daily or as directed. 1 kit 0     blood glucose monitoring (ACCU-CHEK MULTICLIX) lancets Use to test blood sugar 1 times daily or as directed. 102 each 1     buPROPion (WELLBUTRIN SR) 150 MG 12 hr tablet Take 1 tablet (150 mg) by mouth 2 times daily 180 tablet 0     hydrOXYzine (ATARAX) 25 MG tablet Take 1 tablet (25 mg) by mouth 3 times daily as needed for anxiety 90 tablet 1     lisinopril (ZESTRIL) 5 MG tablet Take 5 mg by mouth daily       lisinopril (ZESTRIL) 5 MG tablet Take 1 tablet (5 mg) by mouth daily 30 tablet 2     metFORMIN (GLUCOPHAGE) 1000 MG tablet Take 1 tablet (1,000 mg) by mouth 2 times daily (with meals) 180 tablet 1       ALLERGIES:   No Known Allergies       ASSESSMENT:    Adherence: Not at goal.  It seems that he is having difficulty affording his medication and also remembering to take medication.  For the affordability component, He could use a different pharmacy which will waive copays for patients on [place] who do not have the funds to pay for their medications.  As far as the remembering component, a pill box may still be the best option.  Medication therapy problem: adherence    Diabetes: At goal based on recent A1c and self reported blood glucose.  He is due for an A1c in a few weeks, has an appointment for  mid-December    HTN: Not at goal based on BP reading taken in clinic today. Goal <130/80 per AHA .However, as he states he has not been taking lisinopril.    Depression: Not at goal based on last PHQ9. A PHQ9 was not obtained today, but should be obtained at follow up visit in one month.  Pedro noted that he should be taking his bupropion more than he is currently taking it.    Anxiety: At goal per patient     All medications were reviewed and found to be indicated, effective, safe and convenient unless drug therapy problem identified as described above.    PLAN:     - Will transfer his medications from [facility] to [facility] ([facility] has the ability to waive copays for [place] patients when they can not afford the copay)  - Discussed and emphasized the use of pill boxes  - Patient to follow up witht his writer if he has any difficulty obtaining medications  - Patient has follow up visit with Mirian Lo, his PCP, in one month.    Options for treatment and/or follow-up care were reviewed with the patient. Pedro Funez was engaged and actively involved in the decision making process. He/She verbalized understanding of the options discussed and was satisfied with the final plan.    Patient was provided with written instructions/medication list via AVS.       Medical conditions reviewed: 5    Medications reviewed: 4    MTP identified: 1    Time spent: 30 minutes    Level of service:2

## 2021-11-04 NOTE — NURSING NOTE
"ROOM:2    Preferred Name: Pedro     31 year old  Chief Complaint   Patient presents with     Recheck Medication     Med review       Blood pressure 136/67, pulse 115, temperature 98.8  F (37.1  C), temperature source Oral, height 1.661 m (5' 5.4\"), weight 103.3 kg (227 lb 12.8 oz), SpO2 96 %. Body mass index is 37.45 kg/m .  BP completed using cuff size:    Patient Active Problem List   Diagnosis     Diabetes mellitus, type 2 (H)     Morbid obesity (H)     MDD (major depressive disorder), recurrent episode, moderate (H)     Intertrigo     GERD (gastroesophageal reflux disease)     REBA (generalized anxiety disorder)     Essential hypertension     Autism spectrum disorder       Wt Readings from Last 2 Encounters:   21 103.3 kg (227 lb 12.8 oz)   21 99.8 kg (220 lb)     BP Readings from Last 3 Encounters:   21 136/67   21 128/86   21 (!) 127/90       No Known Allergies    Current Outpatient Medications   Medication     blood glucose (NO BRAND SPECIFIED) test strip     blood glucose monitoring (ACCU-CHEK FERMIN PLUS) meter device kit     blood glucose monitoring (ACCU-CHEK MULTICLIX) lancets     buPROPion (WELLBUTRIN SR) 150 MG 12 hr tablet     hydrOXYzine (ATARAX) 25 MG tablet     lisinopril (ZESTRIL) 5 MG tablet     lisinopril (ZESTRIL) 5 MG tablet     metFORMIN (GLUCOPHAGE) 1000 MG tablet     No current facility-administered medications for this visit.       Social History     Tobacco Use     Smoking status: Former Smoker     Packs/day: 0.00     Years: 4.00     Pack years: 0.00     Types: Cigarettes     Quit date:      Years since quittin.8     Smokeless tobacco: Current User     Types: Chew   Vaping Use     Vaping Use: Never used   Substance Use Topics     Alcohol use: No     Drug use: No       Honoring Choices - Health Care Directive Guide offered to patient at time of visit.    Health Maintenance Due   Topic Date Due     MICROALBUMIN  Never done     ADVANCE CARE PLANNING  Never " done     DEPRESSION ACTION PLAN  Never done     Pneumococcal Vaccine: Pediatrics (0 to 5 Years) and At-Risk Patients (6 to 64 Years) (1 of 2 - PPSV23) Never done     HIV SCREENING  Never done     HEPATITIS C SCREENING  Never done     HEPATITIS B IMMUNIZATION (1 of 3 - Risk 3-dose series) Never done     COVID-19 Vaccine (2 - Booster for Miah series) 07/03/2021     INFLUENZA VACCINE (1) 09/01/2021     A1C  11/17/2021       Immunization History   Administered Date(s) Administered     COVID-19,PF,Miah 05/08/2021     TDAP Vaccine (Adacel) 03/22/2019       No results found for: PAP    Recent Labs   Lab Test 08/17/21  1452   A1C 6.9   *   HDL 42   TRIG 119   ALT 40   CR 0.75   GFRESTIMATED >90   ALBUMIN 4.0   POTASSIUM 4.1   TSH 1.39       PHQ-2 ( 1999 Pfizer) 9/7/2021   Q1: Little interest or pleasure in doing things 0   Q2: Feeling down, depressed or hopeless 0   PHQ-2 Score 0       PHQ-9 SCORE 8/10/2021 9/7/2021   PHQ-9 Total Score 13 2       REBA-7 SCORE 8/10/2021 9/7/2021   Total Score 16 2       No flowsheet data found.    Sunshine Lerma    November 4, 2021 2:06 PM

## 2021-11-04 NOTE — Clinical Note
"Jet Vela- I saw Pedro on Thursday.  He is having trouble taking his medications due to both cost and --his term, being \"scatterbrained.\"  I sent his meds to [facility] where copay can be waived and we discussed organization strategies.  He sees you in one month for A1c. I will plan to follow up with him in January, sooner if anything else comes up. - Nuria"

## 2021-12-14 ENCOUNTER — OFFICE VISIT (OUTPATIENT)
Dept: FAMILY MEDICINE | Facility: CLINIC | Age: 31
End: 2021-12-14
Payer: COMMERCIAL

## 2021-12-14 VITALS
HEIGHT: 65 IN | SYSTOLIC BLOOD PRESSURE: 141 MMHG | TEMPERATURE: 100.1 F | BODY MASS INDEX: 38.4 KG/M2 | WEIGHT: 230.5 LBS | DIASTOLIC BLOOD PRESSURE: 91 MMHG | OXYGEN SATURATION: 94 %

## 2021-12-14 DIAGNOSIS — E66.812 CLASS 2 SEVERE OBESITY WITH SERIOUS COMORBIDITY AND BODY MASS INDEX (BMI) OF 38.0 TO 38.9 IN ADULT, UNSPECIFIED OBESITY TYPE (H): Primary | ICD-10-CM

## 2021-12-14 DIAGNOSIS — F41.8 MIXED ANXIETY AND DEPRESSIVE DISORDER: ICD-10-CM

## 2021-12-14 DIAGNOSIS — E78.5 HYPERLIPIDEMIA LDL GOAL <100: ICD-10-CM

## 2021-12-14 DIAGNOSIS — E11.9 TYPE 2 DIABETES MELLITUS WITHOUT COMPLICATION, WITHOUT LONG-TERM CURRENT USE OF INSULIN (H): ICD-10-CM

## 2021-12-14 DIAGNOSIS — I10 BENIGN ESSENTIAL HYPERTENSION: ICD-10-CM

## 2021-12-14 DIAGNOSIS — E66.01 CLASS 2 SEVERE OBESITY WITH SERIOUS COMORBIDITY AND BODY MASS INDEX (BMI) OF 38.0 TO 38.9 IN ADULT, UNSPECIFIED OBESITY TYPE (H): Primary | ICD-10-CM

## 2021-12-14 LAB
CREAT UR-MCNC: 110 MG/DL
HBA1C MFR BLD: 6.9 % (ref 0–5.6)
MICROALBUMIN UR-MCNC: 85 MG/L
MICROALBUMIN/CREAT UR: 77.27 MG/G CR (ref 0–17)

## 2021-12-14 PROCEDURE — 82043 UR ALBUMIN QUANTITATIVE: CPT | Performed by: NURSE PRACTITIONER

## 2021-12-14 PROCEDURE — 83036 HEMOGLOBIN GLYCOSYLATED A1C: CPT | Performed by: NURSE PRACTITIONER

## 2021-12-14 RX ORDER — LISINOPRIL 5 MG/1
5 TABLET ORAL DAILY
Qty: 90 TABLET | Refills: 1 | Status: SHIPPED | OUTPATIENT
Start: 2021-12-14 | End: 2022-05-10 | Stop reason: DRUGHIGH

## 2021-12-14 RX ORDER — BUPROPION HYDROCHLORIDE 150 MG/1
150 TABLET, EXTENDED RELEASE ORAL 2 TIMES DAILY
Qty: 180 TABLET | Refills: 0 | Status: SHIPPED | OUTPATIENT
Start: 2021-12-14 | End: 2022-05-10

## 2021-12-14 ASSESSMENT — MIFFLIN-ST. JEOR: SCORE: 1909.48

## 2021-12-14 NOTE — LETTER
December 16, 2021      Pedro READ Janiya Armin  [address]   [facility] [address]        Dear Mr.Siegl Funez,    We are writing to inform you of your test results.    Test results indicate you may require additional follow up, see comment below.    Resulted Orders   Hemoglobin A1c   Result Value Ref Range    Hemoglobin A1C 6.9 (H) 0.0 - 5.6 %      Comment:      Normal <5.7%   Prediabetes 5.7-6.4%    Diabetes 6.5% or higher     Note: Adopted from ADA consensus guidelines.   Albumin Random Urine Quantitative with Creat Ratio   Result Value Ref Range    Creatinine Urine mg/dL 110 mg/dL    Albumin Urine mg/L 85 mg/L    Albumin Urine mg/g Cr 77.27 (H) 0.00 - 17.00 mg/g Cr   Hi Pedro, your A1c is unchanged from previous. You will need to take lisinopril regularly and better manage BP and BS. Plan recheck in 3 months.    If you have any questions or concerns, please call the clinic at the number listed above.       Sincerely,      JUAN ALBERTO Moncada CNP

## 2021-12-14 NOTE — NURSING NOTE
"ROOM:2    Preferred Name: Pedro     31 year old  Chief Complaint   Patient presents with     Diabetes       Blood pressure (!) 141/91, temperature 100.1  F (37.8  C), temperature source Oral, height 1.638 m (5' 4.5\"), weight 104.6 kg (230 lb 8 oz), SpO2 94 %. Body mass index is 38.95 kg/m .      Patient Active Problem List   Diagnosis     Diabetes mellitus, type 2 (H)     Morbid obesity (H)     MDD (major depressive disorder), recurrent episode, moderate (H)     Intertrigo     GERD (gastroesophageal reflux disease)     REBA (generalized anxiety disorder)     Essential hypertension     Autism spectrum disorder       Wt Readings from Last 2 Encounters:   21 104.6 kg (230 lb 8 oz)   21 103.3 kg (227 lb 12.8 oz)     BP Readings from Last 3 Encounters:   21 (!) 141/91   21 136/67   21 128/86       No Known Allergies    Current Outpatient Medications   Medication     blood glucose (NO BRAND SPECIFIED) test strip     blood glucose monitoring (ACCU-CHEK FERMIN PLUS) meter device kit     blood glucose monitoring (ACCU-CHEK MULTICLIX) lancets     buPROPion (WELLBUTRIN SR) 150 MG 12 hr tablet     hydrOXYzine (ATARAX) 25 MG tablet     lisinopril (ZESTRIL) 5 MG tablet     metFORMIN (GLUCOPHAGE) 1000 MG tablet     No current facility-administered medications for this visit.       Social History     Tobacco Use     Smoking status: Former Smoker     Packs/day: 0.00     Years: 4.00     Pack years: 0.00     Types: Cigarettes     Quit date: 2020     Years since quittin.9     Smokeless tobacco: Current User     Types: Chew   Vaping Use     Vaping Use: Never used   Substance Use Topics     Alcohol use: No     Drug use: No       Honoring Choices - Health Care Directive Guide offered to patient at time of visit.    Health Maintenance Due   Topic Date Due     MICROALBUMIN  Never done     ADVANCE CARE PLANNING  Never done     DEPRESSION ACTION PLAN  Never done     Pneumococcal Vaccine: Pediatrics (0 to 5 " Years) and At-Risk Patients (6 to 64 Years) (1 of 2 - PPSV23) Never done     HIV SCREENING  Never done     HEPATITIS C SCREENING  Never done     HEPATITIS B IMMUNIZATION (1 of 3 - Risk 3-dose series) Never done     COVID-19 Vaccine (2 - Booster for Miah series) 07/03/2021     INFLUENZA VACCINE (1) 09/01/2021     A1C  11/17/2021       Immunization History   Administered Date(s) Administered     COVID-19,PF,Miah 05/08/2021     TDAP Vaccine (Adacel) 03/22/2019       No results found for: PAP      Recent Labs   Lab Test 08/17/21  1452   A1C 6.9   *   HDL 42   TRIG 119   ALT 40   CR 0.75   GFRESTIMATED >90   ALBUMIN 4.0   POTASSIUM 4.1   TSH 1.39       PHQ-2 ( 1999 Pfizer) 12/14/2021 9/7/2021   Q1: Little interest or pleasure in doing things 0 0   Q2: Feeling down, depressed or hopeless 0 0   PHQ-2 Score 0 0   PHQ-2 Total Score (12-17 Years)- Positive if 3 or more points; Administer PHQ-A if positive - 0       PHQ-9 SCORE 8/10/2021 9/7/2021   PHQ-9 Total Score 13 2       REBA-7 SCORE 8/10/2021 9/7/2021   Total Score 16 2       No flowsheet data found.      Chanel Lerma CMA  December 14, 2021 3:38 PM

## 2021-12-14 NOTE — PATIENT INSTRUCTIONS
Hypertension  Refilled lisinopril  Be sure to take daily  Goal BP is 120-130/80  If consistently above that, will need to increase dose  Weight loss, low fat, low salt diet will help    Obesity and type 2 DM  A1c today  Referred to nutrition  Plan weight loss, low fat, low salt, more vegetables   Try walking or exercising 20-30 minutes 5 times per week  Goal BS .     Recheck microalbumin today as not done previously    Next visit 3 months

## 2021-12-14 NOTE — PROGRESS NOTES
"       HPI       Pedro Funez is a 31 year old  who presents for   Chief Complaint   Patient presents with     Diabetes    31 year-old male here for follow up of diabetes and hypertension. Has diabetes and high blood pressure for several years. Has had trouble getting medications. BS usually around 100 but does not check regularly.  Has trouble remembering to take medications. Recently could not afford, see Pharmacist note, but thinks this is resolved now.   Diet: eating better than Ramen. Has been trying to eat vegetables.  Knows he is overweight and interested in addressing lifestyle.   Saw eye doctor, has prescription for new glasses. Went 1 month ago  Dentist: has not been in several years    Temp is slightly elevated today, but reports he feels fine. Denies urinary or respiratory symptoms.     Problem, Medication and Allergy Lists were reviewed and updated if needed..    Patient is an established patient of this clinic..         Review of Systems:   Review of Systems  GEN: low grade fever. Denies malaise or fatigue. Still overweight  EYES: recent eye exam. No concerns. Has dry eyes in winter, but usually resolves quickly. Denies itching or drainage.   ENT: denies URI symptoms. Taste and smell intact.   RESP: negative for cough, SOB  CV: negative for chest pain, irregular heart beat  GI: burping more lately. Denies abdominal pain, nausea or vomiting   : negative for dysuria, occasional frequency but this correlates with increased fluid intake by report  NEURO: denies numbness or tingling in extremities  ENDO: BS as above. Some nonadherence with meds due to cost, rejection of insurance at [facility].  [facility] [place] [facility]        Physical Exam:     Vitals:    12/14/21 1535   BP: (!) 141/91   Temp: 100.1  F (37.8  C)   TempSrc: Oral   SpO2: 94%   Weight: 104.6 kg (230 lb 8 oz)   Height: 1.638 m (5' 4.5\")     Body mass index is 38.95 kg/m .  Vital signs normal except HR was 112, T 100.1 and /91 (Did not take " lisinopril today)     Physical Exam  GEN: alert talkative male in NAD  EYES: watering, but no injection of conjunctivae and no discharge, EOM intact. COLE  RESP: Lungs CTA with air entry throughout. Occasional dry cough during encounter  CV: HRRR, S1 S2, rapid HR today.  No MRG, Negative peripheral edema. Pedal pulses +2 bilaterally  FOOT EXAM: small callouses heels and medial surface great toe. Sensation intact to monofilament dorsal and plantar surfaces feet and toes.  Nails intact. Mild erythema medial surface great toes bilaterally.   DERM: Sweaty    Results:   Results are ordered and pending    Assessment and Plan      1. Benign essential hypertension  Recommend dietary changes: low fat, low salt, regular exercise and weight loss; adherence to medication. Goal BP is 120-130/80  If consistently above that, will need to increase dose    - lisinopril (ZESTRIL) 5 MG tablet; Take 1 tablet (5 mg) by mouth daily  Dispense: 90 tablet; Refill: 1  - Albumin Random Urine Quantitative with Creat Ratio; Future  - Albumin Random Urine Quantitative with Creat Ratio    2. Type 2 diabetes mellitus without complication, without long-term current use of insulin (H)  Refilled medications. Discussed importance of adhering to medication regimen. Discussed setting alarm on phone to help him remember to take them. Reviewed diet and activity as above. Discussed significance of high CHO diet on BS  - metFORMIN (GLUCOPHAGE) 1000 MG tablet; Take 1 tablet (1,000 mg) by mouth 2 times daily (with meals)  Dispense: 180 tablet; Refill: 1  - Hemoglobin A1c; Future  - Nutrition Referral  - Albumin Random Urine Quantitative with Creat Ratio; Future  - Hemoglobin A1c  - Albumin Random Urine Quantitative with Creat Ratio    3. Mixed anxiety and depressive disorder  Refilled. Feels like mood is good and stable.   - buPROPion (WELLBUTRIN SR) 150 MG 12 hr tablet; Take 1 tablet (150 mg) by mouth 2 times daily  Dispense: 180 tablet; Refill: 0    4.  Class 2 severe obesity with serious comorbidity and body mass index (BMI) of 38.0 to 38.9 in adult, unspecified obesity type (H)  Address obesity, type 2 DM, hyperlipidemia  - Nutrition Referral    5. Hyperlipidemia LDL goal <100  Recheck in  3-6 months after lifestyle changes  - Nutrition Referral    Follow up in 3 months. Monitor fever, if develops symptoms of infection, seek care.      There are no discontinued medications.    Options for treatment and follow-up care were reviewed with the patient. Pedro Funez  engaged in the decision making process and verbalized understanding of the options discussed and agreed with the final plan.    JUAN ALBERTO Moncada CNP

## 2022-01-25 DIAGNOSIS — E11.9 TYPE 2 DIABETES MELLITUS WITHOUT COMPLICATION, WITHOUT LONG-TERM CURRENT USE OF INSULIN (H): ICD-10-CM

## 2022-03-15 ENCOUNTER — OFFICE VISIT (OUTPATIENT)
Dept: FAMILY MEDICINE | Facility: CLINIC | Age: 32
End: 2022-03-15
Payer: COMMERCIAL

## 2022-03-15 VITALS
SYSTOLIC BLOOD PRESSURE: 127 MMHG | HEART RATE: 116 BPM | TEMPERATURE: 98 F | BODY MASS INDEX: 36.64 KG/M2 | WEIGHT: 228 LBS | HEIGHT: 66 IN | OXYGEN SATURATION: 98 % | DIASTOLIC BLOOD PRESSURE: 83 MMHG

## 2022-03-15 DIAGNOSIS — E11.65 TYPE 2 DIABETES MELLITUS WITH HYPERGLYCEMIA, WITHOUT LONG-TERM CURRENT USE OF INSULIN (H): Primary | ICD-10-CM

## 2022-03-15 DIAGNOSIS — I10 BENIGN ESSENTIAL HYPERTENSION: ICD-10-CM

## 2022-03-15 LAB — HBA1C MFR BLD: 10.7 %

## 2022-03-15 RX ORDER — LIRAGLUTIDE 6 MG/ML
0.6 INJECTION SUBCUTANEOUS DAILY
Qty: 3 ML | Refills: 2 | Status: SHIPPED | OUTPATIENT
Start: 2022-03-15 | End: 2022-06-14

## 2022-03-15 RX ORDER — BLOOD-GLUCOSE METER
EACH MISCELLANEOUS
Qty: 1 KIT | Refills: 0 | Status: SHIPPED | OUTPATIENT
Start: 2022-03-15

## 2022-03-15 NOTE — NURSING NOTE
"ROOM:3    Preferred Name: Pedro     31 year old  Chief Complaint   Patient presents with     Diabetes     Sleep Problem     past month 1/2, bed 11-12AM, wake up 9:30/10AM. Difficulty falling asleep, staying asleep-not interested in medication       Blood pressure 127/83, pulse 116, temperature 98  F (36.7  C), temperature source Oral, height 1.671 m (5' 5.8\"), weight 103.4 kg (228 lb), SpO2 98 %. Body mass index is 37.02 kg/m .  BP completed using cuff size:    Patient Active Problem List   Diagnosis     Diabetes mellitus, type 2 (H)     Morbid obesity (H)     MDD (major depressive disorder), recurrent episode, moderate (H)     Intertrigo     GERD (gastroesophageal reflux disease)     REBA (generalized anxiety disorder)     Essential hypertension     Autism spectrum disorder       Wt Readings from Last 2 Encounters:   03/15/22 103.4 kg (228 lb)   21 104.6 kg (230 lb 8 oz)     BP Readings from Last 3 Encounters:   03/15/22 127/83   21 (!) 141/91   21 136/67       No Known Allergies    Current Outpatient Medications   Medication     blood glucose (NO BRAND SPECIFIED) test strip     blood glucose monitoring (ACCU-CHEK FERMIN PLUS) meter device kit     blood glucose monitoring (ACCU-CHEK MULTICLIX) lancets     buPROPion (WELLBUTRIN SR) 150 MG 12 hr tablet     hydrOXYzine (ATARAX) 25 MG tablet     lisinopril (ZESTRIL) 5 MG tablet     metFORMIN (GLUCOPHAGE) 1000 MG tablet     No current facility-administered medications for this visit.       Social History     Tobacco Use     Smoking status: Former Smoker     Packs/day: 0.00     Years: 4.00     Pack years: 0.00     Types: Cigarettes     Quit date:      Years since quittin.2     Smokeless tobacco: Current User     Types: Chew   Vaping Use     Vaping Use: Never used   Substance Use Topics     Alcohol use: Yes     Comment: 1 beer periodically. Has binged in the past     Drug use: No       Honoring Choices - Health Care Directive Guide offered to " patient at time of visit.    Health Maintenance Due   Topic Date Due     ADVANCE CARE PLANNING  Never done     DEPRESSION ACTION PLAN  Never done     Pneumococcal Vaccine: Pediatrics (0 to 5 Years) and At-Risk Patients (6 to 64 Years) (1 of 2 - PPSV23) Never done     HIV SCREENING  Never done     HEPATITIS C SCREENING  Never done     HEPATITIS B IMMUNIZATION (1 of 3 - Risk 3-dose series) Never done     INFLUENZA VACCINE (1) 09/01/2021     A1C  03/14/2022     PHQ-9  03/07/2022       Immunization History   Administered Date(s) Administered     COVID-19,PF,Miah 05/08/2021     TDAP Vaccine (Adacel) 03/22/2019       No results found for: PAP    Recent Labs   Lab Test 12/14/21  1618 08/17/21  1452   A1C 6.9* 6.9   LDL  --  124*   HDL  --  42   TRIG  --  119   ALT  --  40   CR  --  0.75   GFRESTIMATED  --  >90   ALBUMIN  --  4.0   POTASSIUM  --  4.1   TSH  --  1.39       PHQ-2 ( 1999 Pfizer) 12/14/2021 9/7/2021   Q1: Little interest or pleasure in doing things 0 0   Q2: Feeling down, depressed or hopeless 0 0   PHQ-2 Score 0 0   PHQ-2 Total Score (12-17 Years)- Positive if 3 or more points; Administer PHQ-A if positive - 0       PHQ-9 SCORE 8/10/2021 9/7/2021   PHQ-9 Total Score 13 2       REBA-7 SCORE 8/10/2021 9/7/2021   Total Score 16 2       No flowsheet data found.    Sunshine Lerma    March 15, 2022 3:35 PM

## 2022-03-15 NOTE — PROGRESS NOTES
"       HPI       Pedro Funez is a 31 year old  who presents for   Chief Complaint   Patient presents with     Diabetes     Sleep Problem     past month 1/2, bed 11-12AM, wake up 9:30/10AM. Difficulty falling asleep, staying asleep-not interested in medication   31 year-old male presents to clinic for evaluation of several concerns:  1. Trouble falling asleep. Gets 7-8 h per night. Does not awaken feeling rested. Can't fall asleep. Has tried melatonin 5-10mg, auditory white noise. Has taken hydroxyzine at dinner but not at bedtime. Does not think Wellbutrin is bothering him as he takes it with dinner, not at bedtime. History snoring and obesity. Was sent to Sleep Clinic previously. Had intake visit but no follow up. Interested in having sleep study. DOes not have sleep clinic number any more.     2. Lost glucometer recently. Would like a replacement.  Before lost glucometer, noted BS were 150-200. Rarely gets BS 60-70, only once in past 6 months. Had eye exam in 11/2021. He has not noted dysuria, urgency or frequency. Denies vision changes, chest pain, irregular heart beat, dyspnea, headache or numbness or tingling in extremities.     3. Netti pot: Got one but has not been using. Wonders if has sinus infection. Denies rhinorrhea, headache, facial pressure, fever, or cough.       Problem, Medication and Allergy Lists were reviewed and updated if needed..    Patient is an established patient of this clinic..         Review of Systems:   Review of Systems  ROS: as per HPI  CV: negative for peripheral edema  Feet: checks feet nightly. No open sores. Bought larger size shoe because other shoes were rubbing.  Skin: has dry skin and eczema       Physical Exam:     Vitals:    03/15/22 1530   BP: 127/83   Pulse: 116   Temp: 98  F (36.7  C)   TempSrc: Oral   SpO2: 98%   Weight: 103.4 kg (228 lb)   Height: 1.671 m (5' 5.8\")     Body mass index is 37.02 kg/m .  Vital signs normal except BP elevated slightly, and HR " elevated. BMI 37.02     Physical Exam  GEN: alert male in NAD, talkative  HEENT: EYes clear, COLE  CV: HRRR, S1, S2, no MRG  RESP: lungs clear to auscultation with air entry throughout  FEET: callouses, dry and flaky heels bilaterally, no open areas, some dry cracks.No erythematous areas.  Toenails not cut straight across, variable lengths, some thickening.  CR< 3 seconds. Sensation intact to monofilament bilateral feet all surfaces.     Results:      Results from this visit  Results for orders placed or performed in visit on 03/15/22   Hemoglobin A1c     Status: None   Result Value Ref Range    Hemoglobin A1C 10.7 %       Assessment and Plan        1. Type 2 diabetes mellitus with hyperglycemia, without long-term current use of insulin (H)  Huge increase in A1c. Discussed reasons. Denies weight gain, states diet not good and not exercising regularly.  Lost glucometer so has not been checking, but indicates has been taking meds regularly. Discussed options. Has been on insulin in the past when first diagnosed. He might benefit from this again but worried about weight gain. Statred on Liraglutide today at 0.6mg. Reviewed contraindications and patient has none currently. He does report alcohol use in the past but not currently. Referred to pharmacist for consultation also.   - blood glucose monitoring (ACCU-CHEK FERMIN PLUS) meter device kit; Use to test blood sugars one time daily or as directed.  Dispense: 1 kit; Refill: 0  - Hemoglobin A1c; Future  - Hemoglobin A1c; Future  - Hemoglobin A1c  - liraglutide (VICTOZA) 18 MG/3ML solution; Inject 0.6 mg Subcutaneous daily  Dispense: 3 mL; Refill: 2  - Basic metabolic panel; Future    2. Benign essential hypertension  Check daily. Could increase lisinopril but will hold at current dose for now. Urine albumin elevated previously and plan recheck once BS back under control.   - Blood pressure monitor    Given number of sleep clinic and to reschedul sleep study.   Recommend  taking dose of hydroxyzine at HS at his may help sleep Disruption could be due to high BS levels.     RTC for Fasting BMP.     Follow up with pharmacist.        There are no discontinued medications.    Options for treatment and follow-up care were reviewed with the patient. Pedro Funez  engaged in the decision making process and verbalized understanding of the options discussed and agreed with the final plan.    JUAN ALBERTO Moncada CNP

## 2022-03-15 NOTE — PATIENT INSTRUCTIONS
Diabetes Mellitus Type 2  Work on diet and weight loss: increase vegetables, low fat foods. You have lost some weight so that is good  Check A1c today. Plan adding second agent to your medications for better BS control: Liraglutide 0.6mg daily  Will have  lois Quiñones call you  Will check urine albumin in 3 months  A1c in 3 months.   Return for fasting blood sugar. Call to make lab appointment    Insomnia  Sleep Clinic number: [phone]. Schedule sleep study.   Continue with relaxation  If we can get BS down, this might help sleep  Regular exercise, healthy diet may help also  Could consider trying hydroxyzine at bedtime to see if this would help.

## 2022-03-16 ENCOUNTER — LAB (OUTPATIENT)
Dept: LAB | Facility: CLINIC | Age: 32
End: 2022-03-16
Payer: COMMERCIAL

## 2022-03-16 DIAGNOSIS — E11.65 TYPE 2 DIABETES MELLITUS WITH HYPERGLYCEMIA, WITHOUT LONG-TERM CURRENT USE OF INSULIN (H): ICD-10-CM

## 2022-03-16 LAB
ANION GAP SERPL CALCULATED.3IONS-SCNC: 8 MMOL/L (ref 3–14)
BUN SERPL-MCNC: 19 MG/DL (ref 7–30)
CALCIUM SERPL-MCNC: 9.6 MG/DL (ref 8.5–10.1)
CHLORIDE BLD-SCNC: 98 MMOL/L (ref 94–109)
CO2 SERPL-SCNC: 28 MMOL/L (ref 20–32)
CREAT SERPL-MCNC: 0.85 MG/DL (ref 0.66–1.25)
GFR SERPL CREATININE-BSD FRML MDRD: >90 ML/MIN/1.73M2
GLUCOSE BLD-MCNC: 362 MG/DL (ref 70–99)
POTASSIUM BLD-SCNC: 5 MMOL/L (ref 3.4–5.3)
SODIUM SERPL-SCNC: 134 MMOL/L (ref 133–144)

## 2022-03-16 PROCEDURE — 80048 BASIC METABOLIC PNL TOTAL CA: CPT | Performed by: NURSE PRACTITIONER

## 2022-03-17 ENCOUNTER — TELEPHONE (OUTPATIENT)
Dept: PHARMACY | Facility: CLINIC | Age: 32
End: 2022-03-17
Payer: COMMERCIAL

## 2022-03-21 NOTE — TELEPHONE ENCOUNTER
Called patient again and left voice message to call clinic to arrange for time to talk to me and also to schedule follow up.

## 2022-03-29 ENCOUNTER — TELEPHONE (OUTPATIENT)
Dept: FAMILY MEDICINE | Facility: CLINIC | Age: 32
End: 2022-03-29
Payer: COMMERCIAL

## 2022-03-29 NOTE — TELEPHONE ENCOUNTER
TC to patient re: follow up. Message left to schedule follow up appointment in clinic. PharmD has not been able to connect.  Mirian AVENDANO CNP

## 2022-05-10 ENCOUNTER — OFFICE VISIT (OUTPATIENT)
Dept: FAMILY MEDICINE | Facility: CLINIC | Age: 32
End: 2022-05-10
Payer: COMMERCIAL

## 2022-05-10 ENCOUNTER — OFFICE VISIT (OUTPATIENT)
Dept: PHARMACY | Facility: CLINIC | Age: 32
End: 2022-05-10

## 2022-05-10 VITALS
DIASTOLIC BLOOD PRESSURE: 89 MMHG | WEIGHT: 220 LBS | SYSTOLIC BLOOD PRESSURE: 132 MMHG | HEART RATE: 117 BPM | TEMPERATURE: 98 F | BODY MASS INDEX: 35.36 KG/M2 | HEIGHT: 66 IN | OXYGEN SATURATION: 95 %

## 2022-05-10 DIAGNOSIS — F41.8 MIXED ANXIETY AND DEPRESSIVE DISORDER: ICD-10-CM

## 2022-05-10 DIAGNOSIS — E11.9 DIABETES MELLITUS, TYPE 2 (H): Primary | ICD-10-CM

## 2022-05-10 DIAGNOSIS — I10 BENIGN ESSENTIAL HYPERTENSION: Primary | ICD-10-CM

## 2022-05-10 DIAGNOSIS — F41.9 ANXIETY: ICD-10-CM

## 2022-05-10 RX ORDER — HYDROXYZINE HYDROCHLORIDE 25 MG/1
25 TABLET, FILM COATED ORAL 3 TIMES DAILY PRN
Qty: 90 TABLET | Refills: 0 | Status: SHIPPED | OUTPATIENT
Start: 2022-05-10 | End: 2023-04-11

## 2022-05-10 RX ORDER — LISINOPRIL 10 MG/1
10 TABLET ORAL DAILY
Qty: 30 TABLET | Refills: 2 | Status: SHIPPED | OUTPATIENT
Start: 2022-05-10 | End: 2023-04-11

## 2022-05-10 RX ORDER — BUPROPION HYDROCHLORIDE 150 MG/1
150 TABLET, EXTENDED RELEASE ORAL 2 TIMES DAILY
Qty: 180 TABLET | Refills: 0 | Status: SHIPPED | OUTPATIENT
Start: 2022-05-10 | End: 2022-06-30

## 2022-05-10 NOTE — PATIENT INSTRUCTIONS
Diabetes  Goal blood sugars 100-130  Victoza/liraglutide 0.6mg subcutaneous injection daily,  from [facility]  Continue checking blood sugars at home daily, keep a log and bring in to your visits  Healthy nutrition: reduce sugar and carbohydrates.   Will look into resources for [employer] and send you the information  Continue with walking, try to get 30 minutes of physical activity 5 times per week  Daily foot checks for wounds, and foot care  OK to do lotion and Epson salt soaks (try to use room temperature water no longer than 10 minute soaks)     Hypertension  Goal 120-130/80  Blood pressure monitor ordered today  If able to obtain monitor keep log of blood pressures, check BP at different times of day twice per week  Increased lisinopril BP medication to 10mg by mouth per day    Ear wax  No Q tips or anything smaller than your pinky finger in your ear  OK to do irrigations over the counter once per month, no more than that    Rash  Use lotion for rash  Lotion full body after showers    Refills of medications ordered    Follow up 5/19 with Nuria Avitia PharmD for appointment and education about Victoza/liraglutide   BP and labs

## 2022-05-10 NOTE — NURSING NOTE
"ROOM:[address] [place], [place] [address]    Preferred Name: Pedro     31 year old  Chief Complaint   Patient presents with     diabetes follow up        Blood pressure 132/89, pulse 117, temperature 98  F (36.7  C), temperature source Oral, height 1.671 m (5' 5.8\"), weight 99.8 kg (220 lb), SpO2 95 %. Body mass index is 35.73 kg/m .  BP completed using cuff size:    Patient Active Problem List   Diagnosis     Diabetes mellitus, type 2 (H)     Morbid obesity (H)     MDD (major depressive disorder), recurrent episode, moderate (H)     Intertrigo     GERD (gastroesophageal reflux disease)     REBA (generalized anxiety disorder)     Essential hypertension     Autism spectrum disorder       Wt Readings from Last 2 Encounters:   05/10/22 99.8 kg (220 lb)   03/15/22 103.4 kg (228 lb)     BP Readings from Last 3 Encounters:   05/10/22 132/89   03/15/22 127/83   21 (!) 141/91       No Known Allergies    Current Outpatient Medications   Medication     blood glucose (NO BRAND SPECIFIED) test strip     buPROPion (WELLBUTRIN SR) 150 MG 12 hr tablet     hydrOXYzine (ATARAX) 25 MG tablet     liraglutide (VICTOZA) 18 MG/3ML solution     lisinopril (ZESTRIL) 5 MG tablet     metFORMIN (GLUCOPHAGE) 1000 MG tablet     blood glucose monitoring (ACCU-CHEK FERMIN PLUS) meter device kit     blood glucose monitoring (ACCU-CHEK FERMIN PLUS) meter device kit     blood glucose monitoring (ACCU-CHEK MULTICLIX) lancets     No current facility-administered medications for this visit.       Social History     Tobacco Use     Smoking status: Former Smoker     Packs/day: 0.00     Years: 4.00     Pack years: 0.00     Types: Cigarettes     Quit date:      Years since quittin.3     Smokeless tobacco: Current User     Types: Chew   Vaping Use     Vaping Use: Never used   Substance Use Topics     Alcohol use: Yes     Comment: 1 beer periodically. Has binged in the past     Drug use: No       Honoring Choices - Health Care Directive Guide offered to " patient at time of visit.    Health Maintenance Due   Topic Date Due     ADVANCE CARE PLANNING  Never done     DEPRESSION ACTION PLAN  Never done     Pneumococcal Vaccine: Pediatrics (0 to 5 Years) and At-Risk Patients (6 to 64 Years) (1 - PCV) Never done     HIV SCREENING  Never done     HEPATITIS C SCREENING  Never done     HEPATITIS B IMMUNIZATION (1 of 3 - Risk 3-dose series) Never done     PHQ-9  03/07/2022       Immunization History   Administered Date(s) Administered     COVID-19,PF,Miah 05/08/2021     TDAP Vaccine (Adacel) 03/22/2019       No results found for: PAP    Recent Labs   Lab Test 03/16/22  1417 03/15/22  1618 12/14/21  1618 08/17/21  1452   A1C  --  10.7 6.9* 6.9   LDL  --   --   --  124*   HDL  --   --   --  42   TRIG  --   --   --  119   ALT  --   --   --  40   CR 0.85  --   --  0.75   GFRESTIMATED >90  --   --  >90   ALBUMIN  --   --   --  4.0   POTASSIUM 5.0  --   --  4.1   TSH  --   --   --  1.39       PHQ-2 ( 1999 Pfizer) 5/10/2022 12/14/2021   Q1: Little interest or pleasure in doing things 0 0   Q2: Feeling down, depressed or hopeless 0 0   PHQ-2 Score 0 0   PHQ-2 Total Score (12-17 Years)- Positive if 3 or more points; Administer PHQ-A if positive - -       PHQ-9 SCORE 8/10/2021 9/7/2021   PHQ-9 Total Score 13 2       REBA-7 SCORE 8/10/2021 9/7/2021   Total Score 16 2       No flowsheet data found.    Jordon Brumfield    May 10, 2022 2:05 PM

## 2022-05-10 NOTE — PROGRESS NOTES
HPI       Pedro Funez is a 31 year old  who presents for   Chief Complaint   Patient presents with     diabetes follow up      HPI: Diabetes: Pedro was last seen in clinic on 3/15/22 for a diabetes follow up, his hemoglobin A1c was 10.7 up from 6.9 on 12/14/21.  He was prescribed liraglutide at that visit. Reports he has not picked up the liraglutide, and that he forgot about it. Feels barriers to picking up the new medication are financial (depending on the time of the month with the beginning of the month being preferred), and cold weather because his [place] the metro/[place]. Continues to take metformin and tolerating well. Has been taking his blood sugars once daily at home, average result is about 150 mg/dL. Highest readings have been 220-240. No episodes of hypoglycemia. Feels his sugars were high a couple of months ago, at that time had increased urination and fatigue. Now is feeling less of these symptoms, but trouble falling asleep has persisted. Diet could be better, but due to financial barriers is limited on what he can eat. No vision problems, numbness or tingling of hands and feet, or wounds.     2. Trouble falling asleep: Gets 7-8 hours per day. Sometimes takes from 10 pm until 4 am to fall asleep. Has tried Melatonin but feels this is related to hyperglycemia. History of snoring. Seen at sleep clinic in the past, but did not follow up. Wants to get sugars under control before doing a sleep study.    3. Hypertension: Has not checked his blood pressure. Does not have a blood pressure monitor at home. Is taking lisinopril 5 mg, denies cough, lightheadedness or angioedema. Getting more physical activity since winter has ended, is walking most days of the week. Walks average 1-2 hours.     PHQ-2 Score:     PHQ-2 ( 1999 Pfizer) 5/10/2022 12/14/2021   Q1: Little interest or pleasure in doing things 0 0   Q2: Feeling down, depressed or hopeless 0 0   PHQ-2 Score 0 0   PHQ-2  Total Score (12-17 Years)- Positive if 3 or more points; Administer PHQ-A if positive - -     Problem, Medication and Allergy Lists were      Patient Active Problem List    Diagnosis Date Noted     Diabetes mellitus, type 2 (H) 08/17/2021     Priority: Medium     Morbid obesity (H) 08/17/2021     Priority: Medium     MDD (major depressive disorder), recurrent episode, moderate (H) 10/10/2018     Priority: Medium     REBA (generalized anxiety disorder) 06/21/2018     Priority: Medium     Autism spectrum disorder 06/21/2018     Priority: Medium     Intertrigo 08/03/2016     Priority: Medium     Essential hypertension 08/03/2016     Priority: Medium     GERD (gastroesophageal reflux disease) 05/30/2012     Priority: Medium   ,     Current Outpatient Medications   Medication Sig Dispense Refill     blood glucose (NO BRAND SPECIFIED) test strip Use to test blood sugar 1 time daily or as directed. 200 strip 3     buPROPion (WELLBUTRIN SR) 150 MG 12 hr tablet Take 1 tablet (150 mg) by mouth 2 times daily 180 tablet 0     liraglutide (VICTOZA) 18 MG/3ML solution Inject 0.6 mg Subcutaneous daily 3 mL 2     lisinopril (ZESTRIL) 10 MG tablet Take 1 tablet (10 mg) by mouth daily 30 tablet 2     metFORMIN (GLUCOPHAGE) 1000 MG tablet Take 1 tablet (1,000 mg) by mouth 2 times daily (with meals) 180 tablet 1     blood glucose monitoring (ACCU-CHEK FERMIN PLUS) meter device kit Use to test blood sugars one time daily or as directed. (Patient not taking: Reported on 5/10/2022) 1 kit 0     blood glucose monitoring (ACCU-CHEK FERMIN PLUS) meter device kit Use to test blood sugars 1time daily or as directed. (Patient not taking: Reported on 5/10/2022) 1 kit 0     blood glucose monitoring (ACCU-CHEK MULTICLIX) lancets Use to test blood sugar 1 times daily or as directed. (Patient not taking: Reported on 5/10/2022) 102 each 1     hydrOXYzine (ATARAX) 25 MG tablet Take 1 tablet (25 mg) by mouth 3 times daily as needed for anxiety 90 tablet 0  "     No Known Allergies.    Patient is an established patient of this clinic..         Review of Systems:   Review of Systems   Constitutional: Positive for weight loss.   Eyes: Negative for visual disturbance.   Respiratory: Negative for chest tightness and shortness of breath.    Cardiovascular: Negative for chest pain and palpitations.   Gastrointestinal: Positive for acid reflux.   Endocrine: Positive for recent polyuria. Negative for polydipsia and polyphagia.   Genitourinary: Negative..   Skin: Positive for rash of the back and chest after being outside doing yard work. History of eczema.   Neurological: Negative for numbness or tingling of hands and feet.   Psychiatric/Behavioral: History of depression and anxiety.               Physical Exam:     Vitals:    05/10/22 1402   BP: 132/89   Pulse: 117   Temp: 98  F (36.7  C)   TempSrc: Oral   SpO2: 95%   Weight: 99.8 kg (220 lb)   Height: 1.671 m (5' 5.8\")     Body mass index is 35.73 kg/m .  Vital signs with tachycardia and elevated BP.     Physical Exam  Constitutional:       General: Alert male in no acute distress.  Cardiovascular:      S1 and S2 with RRR and no MGR  Pulmonary:      Effort: Pulmonary effort is normal.      Breath sounds: CTAB   Skin:     General: Skin is warm.      Findings: Rash present of the back and left shoulder with scattered papules approximately 30 and erythema. Cherry angiomas of the left shoulders and anterior chest.    Feet:    No lesions, skin intact, calluses on the bilateral heals.    Monofilament: Minimal sensory impairment detected with monofilament test .   Psychiatric:         Mood and Affect: Mood engaged, affect somewhat flat.         Results:   No testing ordered today    Assessment and Plan      1. Benign essential hypertension  Uncontrolled, goal 120-130/80 mmHg. Increased lisinopril from 5 mg to 10 mg. Blood pressure monitor ordered. Education about how to take BP discussed. Directed Pedro to take his blood pressure at " home twice weekly at different times per day and record in a log. Can bring BP cuff to next visit for further education. Continue with physical activity with a goal of 150 minutes per week. RTC in two weeks for BP recheck.  - lisinopril (ZESTRIL) 10 MG tablet; Take 1 tablet (10 mg) by mouth daily  Dispense: 30 tablet; Refill: 2  - AUTOMATIC BP MONITOR, DIAL    2. Mixed anxiety and depressive disorder  Stable, PHQ2 today score of 0. Continue with current management. Seek care for any new or worsening symptoms.   - buPROPion (WELLBUTRIN SR) 150 MG 12 hr tablet; Take 1 tablet (150 mg) by mouth 2 times daily  Dispense: 180 tablet; Refill: 0    3. Diabetes  Uncontrolled. Last A1c 10.7, a significant increase from prior. Consulted with Nuria Avitia PharmD to address barriers to obtaining liraglutide. With shared decision making plan is for Pedro to  liraglutide and refilled prescriptions after his visit today at the [facility] where he can request to waive his copay. Education regarding use of liraglutide discussed, verbalized understanding. RTC a week from Thursday for appointment with PharmD to assess technique and potential dosage increase. Continue to take blood sugars once per day and log. Bring log to clinic at follow up appointment. Daily foot checks. Healthy diet encouraged use of community food shelves and markets for healthier options. Return for hemoglobin A1c in mid June.  Had multiple questions about foot care: Ok to soak no more than 10 minutes in Epson salts and follow with lotion.     4. Sleep disturbance:  Sleep hygiene measures discussed. Will consider follow up with the Sleep Clinic.    5. Rash  Moisturize left shoulder. Consider hydrocortisone if not improving.   Options for treatment and follow-up care were reviewed with the patient. Pedro Funez  engaged in the decision making process and verbalized understanding of the options discussed and agreed with the final plan.    Maria Ines Inman NP  student  I was present with the NPP student who participated in the service and in the documentation of the services provided. I have verified the history and personally performed the physical exam and medical decision making, as documented by the student and edited by JUAN ALBERTO Higgins CNP  05/10/22  5:45 PM        JUAN ALBERTO Mocnada CNP

## 2022-05-11 ENCOUNTER — TELEPHONE (OUTPATIENT)
Dept: FAMILY MEDICINE | Facility: CLINIC | Age: 32
End: 2022-05-11
Payer: COMMERCIAL

## 2022-05-11 DIAGNOSIS — E11.65 TYPE 2 DIABETES MELLITUS WITH HYPERGLYCEMIA, WITHOUT LONG-TERM CURRENT USE OF INSULIN (H): Primary | ICD-10-CM

## 2022-05-11 NOTE — PROGRESS NOTES
"SUBJECTIVE: Pedro is a 31 year old male who was referred by Mirian Lo for MT services for medication management.    Diabetes: Pedro notes that he is taking metformin.  He has not picked up liraglutide from the pharmacy which was prescribed for him about two months ago. Explored any barriers towards picking up liraglutide.  Pedro is OK with injectable medications, but notes it is difficult to get to the pharmacy during winter.  Additionally, sometimes it is difficult to afford medications especially towards the end of the month.     He is taking his blood sugars most days and notes that they are coming down.  He feels an average blood sugar recently would be about 150.  The higher readings he is seeing around around 220.      Environmental factors that impact patient: Has access to free bus pass.  Notes that most food that he has access to is rich in carbohydrates. Lives on fixed income.       Patient Active Problem List   Diagnosis     Diabetes mellitus, type 2 (H)     Morbid obesity (H)     MDD (major depressive disorder), recurrent episode, moderate (H)     Intertrigo     GERD (gastroesophageal reflux disease)     REBA (generalized anxiety disorder)     Essential hypertension     Autism spectrum disorder        OBJECTIVE:     REBA-7 SCORE 8/10/2021 9/7/2021   Total Score 16 2       PHQ-9 SCORE 8/10/2021 9/7/2021   PHQ-9 Total Score 13 2         VITALS:  BP Readings from Last 3 Encounters:   05/10/22 132/89   03/15/22 127/83   12/14/21 (!) 141/91           Weight:   Wt Readings from Last 1 Encounters:   05/10/22 99.8 kg (220 lb)       Height:   Ht Readings from Last 1 Encounters:   05/10/22 1.671 m (5' 5.8\")       LABORATORY VALUES:   Last A1C:    Lab Results   Component Value Date    A1C 10.7 03/15/2022   .    Last Basic Metabolic Panel:  Lab Results   Component Value Date     03/16/2022      Lab Results   Component Value Date    POTASSIUM 5.0 03/16/2022     Lab Results   Component Value Date    CHLORIDE " 98 2022     Lab Results   Component Value Date    RODY 9.6 2022     Lab Results   Component Value Date    CO2 28 2022     Lab Results   Component Value Date    BUN 19 2022     Lab Results   Component Value Date    CR 0.85 2022     Lab Results   Component Value Date     2022       Lipid Panel Labs  Lab Results   Component Value Date    CHOL 190 2021     Lab Results   Component Value Date    TRIG 119 2021     Lab Results   Component Value Date    HDL 42 2021     Lab Results   Component Value Date     2021       Hepatic Panel Labs  Lab Results   Component Value Date    AST 23 2021     Lab Results   Component Value Date    ALT 40 2021         SOCIAL AND FAMILY HISTORY  Social History     Tobacco Use     Smoking status: Former Smoker     Packs/day: 0.00     Years: 4.00     Pack years: 0.00     Types: Cigarettes     Quit date:      Years since quittin.3     Smokeless tobacco: Current User     Types: Chew   Substance Use Topics     Alcohol use: Yes     Comment: 1 beer periodically. Has binged in the past    .  Most Recent Immunizations   Administered Date(s) Administered     COVID-19,PF,Miah 2021     TDAP Vaccine (Adacel) 2019       CURRENT MEDICATIONS:   Current Outpatient Medications   Medication Sig Dispense Refill     hydrOXYzine (ATARAX) 25 MG tablet Take 1 tablet (25 mg) by mouth 3 times daily as needed for anxiety 90 tablet 0     blood glucose (NO BRAND SPECIFIED) test strip Use to test blood sugar 1 time daily or as directed. 200 strip 3     blood glucose monitoring (ACCU-CHEK FERMIN PLUS) meter device kit Use to test blood sugars one time daily or as directed. (Patient not taking: Reported on 5/10/2022) 1 kit 0     blood glucose monitoring (ACCU-CHEK FERMIN PLUS) meter device kit Use to test blood sugars 1time daily or as directed. (Patient not taking: Reported on 5/10/2022) 1 kit 0     blood glucose  monitoring (ACCU-CHEK MULTICLIX) lancets Use to test blood sugar 1 times daily or as directed. (Patient not taking: Reported on 5/10/2022) 102 each 1     buPROPion (WELLBUTRIN SR) 150 MG 12 hr tablet Take 1 tablet (150 mg) by mouth 2 times daily 180 tablet 0     liraglutide (VICTOZA) 18 MG/3ML solution Inject 0.6 mg Subcutaneous daily 3 mL 2     lisinopril (ZESTRIL) 10 MG tablet Take 1 tablet (10 mg) by mouth daily 30 tablet 2     metFORMIN (GLUCOPHAGE) 1000 MG tablet Take 1 tablet (1,000 mg) by mouth 2 times daily (with meals) 180 tablet 1       ALLERGIES:   No Known Allergies       ASSESSMENT:    Diabetes: Not at goal.  Goal A1c is <7%. Although he has not started liraglutide yet, this will still likely be a good medication for him. After exploration of barriers towards obtaining liraglutide, it seems best for him to  the medication from the pharmacy today and then eventually consider switching to a pharmacy that will deliver, such as [facility].   Medication therapy problem: Adherence/convenience    HTN: Not at goal based on BP reading in clinic today. Goal is <140/90.  Lisinopril is at a low dose and can be increased.  Medication therapy problem: dose too low    All medications were reviewed and found to be indicated, effective, safe and convenient unless drug therapy problem identified as described above.    PLAN:   - Pedro to  medications today.  - Discussed how to take and what to expect for liraglutide  - Lisinopril increased to 10 mg daily  - Pedro to follow up with this writer in 1.5 weeks to review blood glucose and assess liraglutide.  At that time, will likely increase liraglutide dose to 1.2 mg daily.    Options for treatment and/or follow-up care were reviewed with the patient. Pedro Funez was engaged and actively involved in the decision making process. He/She verbalized understanding of the options discussed and was satisfied with the final plan.    Patient was provided with  written instructions/medication list via AVS.       Medical conditions reviewed: 2    Medications reviewed: 5    MTP identified: 2    Time spent: 40 minutes    Level of service: 3

## 2022-05-11 NOTE — TELEPHONE ENCOUNTER
"Pharmacy called to say due to his medicare B, they can't fill the accu check multi, so they need a new prescription sent for instead a \"Accu check soft click\".   "

## 2022-05-19 ENCOUNTER — OFFICE VISIT (OUTPATIENT)
Dept: PHARMACY | Facility: CLINIC | Age: 32
End: 2022-05-19
Payer: COMMERCIAL

## 2022-05-19 VITALS
HEART RATE: 115 BPM | OXYGEN SATURATION: 95 % | HEIGHT: 64 IN | SYSTOLIC BLOOD PRESSURE: 132 MMHG | TEMPERATURE: 97.7 F | DIASTOLIC BLOOD PRESSURE: 90 MMHG | BODY MASS INDEX: 37.56 KG/M2 | WEIGHT: 220 LBS

## 2022-05-19 DIAGNOSIS — I10 ESSENTIAL HYPERTENSION: ICD-10-CM

## 2022-05-19 DIAGNOSIS — E11.9 DIABETES MELLITUS, TYPE 2 (H): Primary | ICD-10-CM

## 2022-05-19 RX ORDER — ADHESIVE BANDAGE 3/4"
1 BANDAGE TOPICAL DAILY
Qty: 1 EACH | Refills: 0 | Status: SHIPPED | OUTPATIENT
Start: 2022-05-19 | End: 2023-07-11

## 2022-05-19 NOTE — Clinical Note
Jet Vela, I saw Pedro yesterday.  He does not think he started to take lisinopril and has not been taking liraglutide since he didn't have pen needles.  I talked him through everything and we came up with a plan to get him on track.  He will come back to see me in two weeks.  At that time, we'll fill pill boxes together to make sure we're on the same page. July Quiñones

## 2022-05-19 NOTE — NURSING NOTE
"ROOM:1  CHRISTY MEJIA    Preferred Name: Pedro     31 year old  Chief Complaint   Patient presents with     follow up       Blood pressure (!) 132/90, pulse 115, temperature 97.7  F (36.5  C), temperature source Oral, height 1.626 m (5' 4\"), weight 99.8 kg (220 lb), SpO2 95 %. Body mass index is 37.76 kg/m .  BP completed using cuff size:    Patient Active Problem List   Diagnosis     Diabetes mellitus, type 2 (H)     Morbid obesity (H)     MDD (major depressive disorder), recurrent episode, moderate (H)     Intertrigo     GERD (gastroesophageal reflux disease)     REBA (generalized anxiety disorder)     Essential hypertension     Autism spectrum disorder       Wt Readings from Last 2 Encounters:   22 99.8 kg (220 lb)   05/10/22 99.8 kg (220 lb)     BP Readings from Last 3 Encounters:   22 (!) 132/90   05/10/22 132/89   03/15/22 127/83       No Known Allergies    Current Outpatient Medications   Medication     blood glucose (NO BRAND SPECIFIED) lancets standard     blood glucose (NO BRAND SPECIFIED) test strip     blood glucose monitoring (ACCU-CHEK FERMIN PLUS) meter device kit     blood glucose monitoring (ACCU-CHEK FERMIN PLUS) meter device kit     blood glucose monitoring (ACCU-CHEK MULTICLIX) lancets     buPROPion (WELLBUTRIN SR) 150 MG 12 hr tablet     hydrOXYzine (ATARAX) 25 MG tablet     liraglutide (VICTOZA) 18 MG/3ML solution     lisinopril (ZESTRIL) 10 MG tablet     metFORMIN (GLUCOPHAGE) 1000 MG tablet     No current facility-administered medications for this visit.       Social History     Tobacco Use     Smoking status: Former Smoker     Packs/day: 0.00     Years: 4.00     Pack years: 0.00     Types: Cigarettes     Quit date:      Years since quittin.3     Smokeless tobacco: Current User     Types: Chew   Vaping Use     Vaping Use: Never used   Substance Use Topics     Alcohol use: Yes     Comment: 1 beer periodically. Has binged in the past     Drug use: No       Honoring Choices - " Health Care Directive Guide offered to patient at time of visit.    Health Maintenance Due   Topic Date Due     ADVANCE CARE PLANNING  Never done     DEPRESSION ACTION PLAN  Never done     Pneumococcal Vaccine: Pediatrics (0 to 5 Years) and At-Risk Patients (6 to 64 Years) (1 - PCV) Never done     HIV SCREENING  Never done     HEPATITIS C SCREENING  Never done     HEPATITIS B IMMUNIZATION (1 of 3 - Risk 3-dose series) Never done     PHQ-9  03/07/2022       Immunization History   Administered Date(s) Administered     COVID-19,PF,Miah 05/08/2021     TDAP Vaccine (Adacel) 03/22/2019       No results found for: PAP    Recent Labs   Lab Test 03/16/22  1417 03/15/22  1618 12/14/21  1618 08/17/21  1452   A1C  --  10.7 6.9* 6.9   LDL  --   --   --  124*   HDL  --   --   --  42   TRIG  --   --   --  119   ALT  --   --   --  40   CR 0.85  --   --  0.75   GFRESTIMATED >90  --   --  >90   ALBUMIN  --   --   --  4.0   POTASSIUM 5.0  --   --  4.1   TSH  --   --   --  1.39       PHQ-2 ( 1999 Pfizer) 5/10/2022 12/14/2021   Q1: Little interest or pleasure in doing things 0 0   Q2: Feeling down, depressed or hopeless 0 0   PHQ-2 Score 0 0   PHQ-2 Total Score (12-17 Years)- Positive if 3 or more points; Administer PHQ-A if positive - -       PHQ-9 SCORE 8/10/2021 9/7/2021   PHQ-9 Total Score 13 2       REBA-7 SCORE 8/10/2021 9/7/2021   Total Score 16 2       No flowsheet data found.    Jordon Brumfield    May 19, 2022 2:11 PM

## 2022-05-19 NOTE — PATIENT INSTRUCTIONS
Please go to pharmacy to  pen needles for your Victoza. For this week, you will take 0.6 mg every day.  After 7 days, increase your dose to 1.2 mg.     Next time you come for your appointment, please bring all pill bottles and we will fill a pill box together.     Please ask your  or [facility] if they can help you or know of someone to help you see if you qualify for a insurance that would have lower medication copays.     Please make an appointment to see me in two weeks.

## 2022-05-19 NOTE — PROGRESS NOTES
SUBJECTIVE: Pedro is a 31 year old male who was referred by Mirian Lo for [facility] services for medication management. After last visit, Pedro went to the pharmacy and picked up his medications, but pen needles and glucometer supplies were not available.    Medication use: It is still difficult for him to afford medications. We discussed investigating eligibility for medical assistance plans which may have no medication copays.  He might be able to ask some  in his apartment building or  he has worked with in the past. When I asked, he said he takes two pills in the morning and two pills at night, but when we calculated the number of pills, it should be two in the morning and three at night.     HTN: He thinks he might have not been taking his lisinopril (per discussion above about 2 pills in morning and 2 at night. This is likely bupropion and metformin).    Diabetes: Taking metformin.  He is rarely missing doses. He hasn't been able to track his blood sugar recently since he doesn't have hiw new supplies.    Anxiety: He has taken his hydroxyzine 1-2 times per week, and believes he should take it more often. He is taking wellbutrin, but thinks he has missed a few doses occassionally.    Environmental factors that impact patient: Lives on fixed income, needs to take bus to get to places.      Patient Active Problem List   Diagnosis     Diabetes mellitus, type 2 (H)     Morbid obesity (H)     MDD (major depressive disorder), recurrent episode, moderate (H)     Intertrigo     GERD (gastroesophageal reflux disease)     REBA (generalized anxiety disorder)     Essential hypertension     Autism spectrum disorder        OBJECTIVE:     REBA-7 SCORE 8/10/2021 9/7/2021   Total Score 16 2       PHQ-9 SCORE 8/10/2021 9/7/2021   PHQ-9 Total Score 13 2         VITALS:  BP Readings from Last 3 Encounters:   05/19/22 (!) 132/90   05/10/22 132/89   03/15/22 127/83           Weight:   Wt Readings from Last 1  "Encounters:   22 99.8 kg (220 lb)       Height:   Ht Readings from Last 1 Encounters:   22 1.626 m (5' 4\")       LABORATORY VALUES:   Last A1C:    Lab Results   Component Value Date    A1C 10.7 03/15/2022   .    Last Basic Metabolic Panel:  Lab Results   Component Value Date     2022      Lab Results   Component Value Date    POTASSIUM 5.0 2022     Lab Results   Component Value Date    CHLORIDE 98 2022     Lab Results   Component Value Date    RODY 9.6 2022     Lab Results   Component Value Date    CO2 28 2022     Lab Results   Component Value Date    BUN 19 2022     Lab Results   Component Value Date    CR 0.85 2022     Lab Results   Component Value Date     2022       Lipid Panel Labs  Lab Results   Component Value Date    CHOL 190 2021     Lab Results   Component Value Date    TRIG 119 2021     Lab Results   Component Value Date    HDL 42 2021     Lab Results   Component Value Date     2021       Hepatic Panel Labs  Lab Results   Component Value Date    AST 23 2021     Lab Results   Component Value Date    ALT 40 2021         SOCIAL AND FAMILY HISTORY  Social History     Tobacco Use     Smoking status: Former Smoker     Packs/day: 0.00     Years: 4.00     Pack years: 0.00     Types: Cigarettes     Quit date: 2020     Years since quittin.3     Smokeless tobacco: Current User     Types: Chew   Substance Use Topics     Alcohol use: Yes     Comment: 1 beer periodically. Has binged in the past    .  Most Recent Immunizations   Administered Date(s) Administered     COVID-19,PF,Miah 2021     TDAP Vaccine (Adacel) 2019       CURRENT MEDICATIONS:   Current Outpatient Medications   Medication Sig Dispense Refill     insulin pen needle (32G X 4 MM) 32G X 4 MM miscellaneous Use 1 pen needles daily or as directed. 30 each 11     blood glucose (NO BRAND SPECIFIED) lancets standard Use to test " blood sugar one times daily or as directed. 100 lancet 1     blood glucose (NO BRAND SPECIFIED) test strip Use to test blood sugar 1 time daily or as directed. 200 strip 3     blood glucose monitoring (ACCU-CHEK FERMIN PLUS) meter device kit Use to test blood sugars one time daily or as directed. (Patient not taking: Reported on 5/10/2022) 1 kit 0     blood glucose monitoring (ACCU-CHEK FERMIN PLUS) meter device kit Use to test blood sugars 1time daily or as directed. (Patient not taking: Reported on 5/10/2022) 1 kit 0     blood glucose monitoring (ACCU-CHEK MULTICLIX) lancets Use to test blood sugar 1 times daily or as directed. (Patient not taking: Reported on 5/10/2022) 102 each 1     buPROPion (WELLBUTRIN SR) 150 MG 12 hr tablet Take 1 tablet (150 mg) by mouth 2 times daily 180 tablet 0     hydrOXYzine (ATARAX) 25 MG tablet Take 1 tablet (25 mg) by mouth 3 times daily as needed for anxiety 90 tablet 0     liraglutide (VICTOZA) 18 MG/3ML solution Inject 0.6 mg Subcutaneous daily 3 mL 2     lisinopril (ZESTRIL) 10 MG tablet Take 1 tablet (10 mg) by mouth daily 30 tablet 2     metFORMIN (GLUCOPHAGE) 1000 MG tablet Take 1 tablet (1,000 mg) by mouth 2 times daily (with meals) 180 tablet 1       ALLERGIES:   No Known Allergies       ASSESSMENT:    Medication use: It is still difficult for Pedro to obtain and take his medications.  I believe it would be best to determine if he qualifies for a medical assistance program with $0 copays.  He will first look into this himself and I can connect him with social work in the future if he needs that. If he does have $0 copays, we can set him up with medication delivery, such as through [employer]. Additionally, I think Pedro would benefit from a visit where we fill his pill box together so that we can make sure that we are on the same page with the medications he is taking.    HTN: Not at goal, but it is likely that he is not taking lisinopril  Medication therapy problem:  adherence    DM: Not at goal based on recent A1c.  Has not been able to give liraglutide since he doesn't have pen needles.  Medication therapy problem: adherence    All medications were reviewed and found to be indicated, effective, safe and convenient unless drug therapy problem identified as described above.    PLAN:     -Please go to pharmacy to  pen needles for your Victoza. For this week, you will take 0.6 mg every day.  After 7 days, increase your dose to 1.2 mg.   -Next time you come for your appointment, please bring all pill bottles and we will fill a pill box together.   -Please ask your  or [facility] if they can help you or know of someone to help you see if you qualify for a insurance that would have lower medication copays.   -Please make an appointment to see me in two weeks.     Options for treatment and/or follow-up care were reviewed with the patient. Pedro Funez was engaged and actively involved in the decision making process. He/She verbalized understanding of the options discussed and was satisfied with the final plan.    Patient was provided with written instructions/medication list via AVS.       Medical conditions reviewed: 3    Medications reviewed: 5    MTP identified: 2    Time spent: 30 minutes    Level of service: 3

## 2022-06-02 ENCOUNTER — OFFICE VISIT (OUTPATIENT)
Dept: PHARMACY | Facility: CLINIC | Age: 32
End: 2022-06-02
Payer: COMMERCIAL

## 2022-06-02 VITALS
BODY MASS INDEX: 36.09 KG/M2 | DIASTOLIC BLOOD PRESSURE: 92 MMHG | OXYGEN SATURATION: 98 % | WEIGHT: 216.6 LBS | HEIGHT: 65 IN | TEMPERATURE: 97.9 F | HEART RATE: 120 BPM | SYSTOLIC BLOOD PRESSURE: 135 MMHG

## 2022-06-02 DIAGNOSIS — E11.9 DIABETES MELLITUS, TYPE 2 (H): Primary | ICD-10-CM

## 2022-06-02 NOTE — NURSING NOTE
"ROOM:2  CHRISTY MEJIA    Preferred Name: Pedro     31 year old  Chief Complaint   Patient presents with     Recheck Medication     Review, follow/up       Blood pressure (!) 135/92, pulse 120, temperature 97.9  F (36.6  C), temperature source Oral, height 1.656 m (5' 5.2\"), weight 98.2 kg (216 lb 9.6 oz), SpO2 98 %. Body mass index is 35.82 kg/m .  BP completed using cuff size:    Patient Active Problem List   Diagnosis     Diabetes mellitus, type 2 (H)     Morbid obesity (H)     MDD (major depressive disorder), recurrent episode, moderate (H)     Intertrigo     GERD (gastroesophageal reflux disease)     REBA (generalized anxiety disorder)     Essential hypertension     Autism spectrum disorder       Wt Readings from Last 2 Encounters:   22 98.2 kg (216 lb 9.6 oz)   22 99.8 kg (220 lb)     BP Readings from Last 3 Encounters:   22 (!) 135/92   22 (!) 132/90   05/10/22 132/89       No Known Allergies    Current Outpatient Medications   Medication     blood glucose (NO BRAND SPECIFIED) lancets standard     blood glucose (NO BRAND SPECIFIED) test strip     blood glucose monitoring (ACCU-CHEK FERMIN PLUS) meter device kit     blood glucose monitoring (ACCU-CHEK FERMIN PLUS) meter device kit     blood glucose monitoring (ACCU-CHEK MULTICLIX) lancets     Blood Pressure Monitoring (BLOOD PRESSURE CUFF) MISC     buPROPion (WELLBUTRIN SR) 150 MG 12 hr tablet     hydrOXYzine (ATARAX) 25 MG tablet     insulin pen needle (32G X 4 MM) 32G X 4 MM miscellaneous     liraglutide (VICTOZA) 18 MG/3ML solution     lisinopril (ZESTRIL) 10 MG tablet     metFORMIN (GLUCOPHAGE) 1000 MG tablet     No current facility-administered medications for this visit.       Social History     Tobacco Use     Smoking status: Former Smoker     Packs/day: 0.00     Years: 4.00     Pack years: 0.00     Types: Cigarettes     Quit date:      Years since quittin.4     Smokeless tobacco: Current User     Types: Chew   Vaping Use     " Vaping Use: Never used   Substance Use Topics     Alcohol use: Yes     Comment: 1 beer periodically. Has binged in the past     Drug use: No       Honoring Choices - Health Care Directive Guide offered to patient at time of visit.    Health Maintenance Due   Topic Date Due     ADVANCE CARE PLANNING  Never done     DEPRESSION ACTION PLAN  Never done     Pneumococcal Vaccine: Pediatrics (0 to 5 Years) and At-Risk Patients (6 to 64 Years) (1 - PCV) Never done     HIV SCREENING  Never done     HEPATITIS C SCREENING  Never done     HEPATITIS B IMMUNIZATION (1 of 3 - Risk 3-dose series) Never done     PHQ-9  03/07/2022     A1C  06/15/2022       Immunization History   Administered Date(s) Administered     COVID-19,PF,Miah 05/08/2021     TDAP Vaccine (Adacel) 03/22/2019       No results found for: PAP    Recent Labs   Lab Test 03/16/22  1417 03/15/22  1618 12/14/21  1618 08/17/21  1452   A1C  --  10.7 6.9* 6.9   LDL  --   --   --  124*   HDL  --   --   --  42   TRIG  --   --   --  119   ALT  --   --   --  40   CR 0.85  --   --  0.75   GFRESTIMATED >90  --   --  >90   ALBUMIN  --   --   --  4.0   POTASSIUM 5.0  --   --  4.1   TSH  --   --   --  1.39       PHQ-2 ( 1999 Pfizer) 5/10/2022 12/14/2021   Q1: Little interest or pleasure in doing things 0 0   Q2: Feeling down, depressed or hopeless 0 0   PHQ-2 Score 0 0   PHQ-2 Total Score (12-17 Years)- Positive if 3 or more points; Administer PHQ-A if positive - -       PHQ-9 SCORE 8/10/2021 9/7/2021   PHQ-9 Total Score 13 2       REBA-7 SCORE 8/10/2021 9/7/2021   Total Score 16 2       No flowsheet data found.    Sunshine Lerma    June 2, 2022 2:30 PM

## 2022-06-02 NOTE — PATIENT INSTRUCTIONS
- Go to pharmacy and  needles  - Meet with me on Tuesday. Bring pill boxes, liraglutide (Victoza) and needles  - Work on shifting your sleep by 30 minutes - 1 hour each day. Use alarm to wake you up.

## 2022-06-02 NOTE — Clinical Note
I saw Pedro yesterday.  He still had not picked up his needles so had not started Victoza. Also, I think he is missing about 50% of the pills he has.  We filled pill boxes together.  I did some motivational interviewing around the importance of blood sugar control.  I will see him again on Tuesday for a quick check in (hopefully it is motivation for him to  needles before then). - Nuria

## 2022-06-07 ENCOUNTER — OFFICE VISIT (OUTPATIENT)
Dept: PHARMACY | Facility: CLINIC | Age: 32
End: 2022-06-07
Payer: COMMERCIAL

## 2022-06-07 VITALS
OXYGEN SATURATION: 97 % | HEART RATE: 123 BPM | DIASTOLIC BLOOD PRESSURE: 88 MMHG | WEIGHT: 216 LBS | HEIGHT: 65 IN | BODY MASS INDEX: 35.99 KG/M2 | SYSTOLIC BLOOD PRESSURE: 130 MMHG | TEMPERATURE: 97.8 F

## 2022-06-07 DIAGNOSIS — E11.9 DIABETES MELLITUS, TYPE 2 (H): Primary | ICD-10-CM

## 2022-06-07 NOTE — NURSING NOTE
"ROOM:[address] [place] [address] CHRISTY RISHABH    Preferred Name: Pedro     31 year old  Chief Complaint   Patient presents with     RECHECK     Follow up        Blood pressure 130/88, pulse (!) 123, temperature 97.8  F (36.6  C), temperature source Oral, height 1.651 m (5' 5\"), weight 98 kg (216 lb), SpO2 97 %. Body mass index is 35.94 kg/m .  BP completed using cuff size:    Patient Active Problem List   Diagnosis     Diabetes mellitus, type 2 (H)     Morbid obesity (H)     MDD (major depressive disorder), recurrent episode, moderate (H)     Intertrigo     GERD (gastroesophageal reflux disease)     REBA (generalized anxiety disorder)     Essential hypertension     Autism spectrum disorder       Wt Readings from Last 2 Encounters:   22 98 kg (216 lb)   22 98.2 kg (216 lb 9.6 oz)     BP Readings from Last 3 Encounters:   22 130/88   22 (!) 135/92   22 (!) 132/90       No Known Allergies    Current Outpatient Medications   Medication     blood glucose (NO BRAND SPECIFIED) lancets standard     blood glucose (NO BRAND SPECIFIED) test strip     blood glucose monitoring (ACCU-CHEK FERMIN PLUS) meter device kit     blood glucose monitoring (ACCU-CHEK FERMIN PLUS) meter device kit     blood glucose monitoring (ACCU-CHEK MULTICLIX) lancets     Blood Pressure Monitoring (BLOOD PRESSURE CUFF) MISC     buPROPion (WELLBUTRIN SR) 150 MG 12 hr tablet     hydrOXYzine (ATARAX) 25 MG tablet     insulin pen needle (32G X 4 MM) 32G X 4 MM miscellaneous     liraglutide (VICTOZA) 18 MG/3ML solution     lisinopril (ZESTRIL) 10 MG tablet     metFORMIN (GLUCOPHAGE) 1000 MG tablet     No current facility-administered medications for this visit.       Social History     Tobacco Use     Smoking status: Former Smoker     Packs/day: 0.00     Years: 4.00     Pack years: 0.00     Types: Cigarettes     Quit date:      Years since quittin.4     Smokeless tobacco: Current User     Types: Chew   Vaping Use     Vaping Use: Never used "   Substance Use Topics     Alcohol use: Yes     Comment: 1 beer periodically. Has binged in the past     Drug use: No       Honoring Choices - Health Care Directive Guide offered to patient at time of visit.    Health Maintenance Due   Topic Date Due     ADVANCE CARE PLANNING  Never done     DEPRESSION ACTION PLAN  Never done     Pneumococcal Vaccine: Pediatrics (0 to 5 Years) and At-Risk Patients (6 to 64 Years) (1 - PCV) Never done     HIV SCREENING  Never done     HEPATITIS C SCREENING  Never done     HEPATITIS B IMMUNIZATION (1 of 3 - Risk 3-dose series) Never done     PHQ-9  03/07/2022     A1C  06/15/2022       Immunization History   Administered Date(s) Administered     COVID-19,PF,Miah 05/08/2021     TDAP Vaccine (Adacel) 03/22/2019       No results found for: PAP    Recent Labs   Lab Test 03/16/22  1417 03/15/22  1618 12/14/21  1618 08/17/21  1452   A1C  --  10.7 6.9* 6.9   LDL  --   --   --  124*   HDL  --   --   --  42   TRIG  --   --   --  119   ALT  --   --   --  40   CR 0.85  --   --  0.75   GFRESTIMATED >90  --   --  >90   ALBUMIN  --   --   --  4.0   POTASSIUM 5.0  --   --  4.1   TSH  --   --   --  1.39       PHQ-2 ( 1999 Pfizer) 5/10/2022 12/14/2021   Q1: Little interest or pleasure in doing things 0 0   Q2: Feeling down, depressed or hopeless 0 0   PHQ-2 Score 0 0   PHQ-2 Total Score (12-17 Years)- Positive if 3 or more points; Administer PHQ-A if positive - -       PHQ-9 SCORE 8/10/2021 9/7/2021   PHQ-9 Total Score 13 2       REBA-7 SCORE 8/10/2021 9/7/2021   Total Score 16 2       No flowsheet data found.    Jordon Brumfield    June 7, 2022 2:42 PM

## 2022-06-07 NOTE — Clinical Note
As I mentioned, Pedro successfully injected Victoza yesterday!  If all is going well when he sees you next week, he should increase dose to 1.2 mg daily.  Next time I see him, I will likely change metformin to ER version and have him try to take all 2000 mg in the evening since he is having difficulty taking medication in the morning. July Quiñones

## 2022-06-07 NOTE — PATIENT INSTRUCTIONS
- Great job using the Victoza!  Continue to take 0.6 mg daily for the first week.  If all is going well when you see Mirian in one week, you should increase the dose to 1.2 mg.   - Please schedule a follow up with me on Thursday June 30

## 2022-06-08 NOTE — PROGRESS NOTES
"SUBJECTIVE: Pedro is a 31 year old male who was referred by Mirian Lo for [facility] services for medication management.    Diabetes: Since last visit, Pedro was able to obtain pen needles to be used with his Victoza.  He reports that he has not injected Victoza yet, but brings it to the visit to inject during the visit.    General medication management: Pill boxes are going well, but Pedro is still missing most morning doses of his medication (morning meds include metformin and bupropion).      Environmental factors that impact patient: Lives alone, financially limited and limited transportation.      Patient Active Problem List   Diagnosis     Diabetes mellitus, type 2 (H)     Morbid obesity (H)     MDD (major depressive disorder), recurrent episode, moderate (H)     Intertrigo     GERD (gastroesophageal reflux disease)     REBA (generalized anxiety disorder)     Essential hypertension     Autism spectrum disorder        OBJECTIVE:     REBA-7 SCORE 8/10/2021 9/7/2021   Total Score 16 2       PHQ-9 SCORE 8/10/2021 9/7/2021   PHQ-9 Total Score 13 2         VITALS:  BP Readings from Last 3 Encounters:   06/07/22 130/88   06/02/22 (!) 135/92   05/19/22 (!) 132/90           Weight:   Wt Readings from Last 1 Encounters:   06/07/22 98 kg (216 lb)       Height:   Ht Readings from Last 1 Encounters:   06/07/22 1.651 m (5' 5\")       LABORATORY VALUES:   Last A1C:    Lab Results   Component Value Date    A1C 10.7 03/15/2022   .    Last Basic Metabolic Panel:  Lab Results   Component Value Date     03/16/2022      Lab Results   Component Value Date    POTASSIUM 5.0 03/16/2022     Lab Results   Component Value Date    CHLORIDE 98 03/16/2022     Lab Results   Component Value Date    RODY 9.6 03/16/2022     Lab Results   Component Value Date    CO2 28 03/16/2022     Lab Results   Component Value Date    BUN 19 03/16/2022     Lab Results   Component Value Date    CR 0.85 03/16/2022     Lab Results   Component Value Date    GLC " 362 2022       Lipid Panel Labs  Lab Results   Component Value Date    CHOL 190 2021     Lab Results   Component Value Date    TRIG 119 2021     Lab Results   Component Value Date    HDL 42 2021     Lab Results   Component Value Date     2021       Hepatic Panel Labs  Lab Results   Component Value Date    AST 23 2021     Lab Results   Component Value Date    ALT 40 2021         SOCIAL AND FAMILY HISTORY  Social History     Tobacco Use     Smoking status: Former Smoker     Packs/day: 0.00     Years: 4.00     Pack years: 0.00     Types: Cigarettes     Quit date: 2020     Years since quittin.4     Smokeless tobacco: Current User     Types: Chew   Substance Use Topics     Alcohol use: Yes     Comment: 1 beer periodically. Has binged in the past    .  Most Recent Immunizations   Administered Date(s) Administered     COVID-19,PF,Miah 2021     TDAP Vaccine (Adacel) 2019       CURRENT MEDICATIONS:   Current Outpatient Medications   Medication Sig Dispense Refill     blood glucose (NO BRAND SPECIFIED) lancets standard Use to test blood sugar one times daily or as directed. 100 lancet 1     blood glucose (NO BRAND SPECIFIED) test strip Use to test blood sugar 1 time daily or as directed. 200 strip 3     blood glucose monitoring (ACCU-CHEK FERMIN PLUS) meter device kit Use to test blood sugars one time daily or as directed. (Patient not taking: Reported on 5/10/2022) 1 kit 0     blood glucose monitoring (ACCU-CHEK FERMIN PLUS) meter device kit Use to test blood sugars 1time daily or as directed. (Patient not taking: Reported on 5/10/2022) 1 kit 0     blood glucose monitoring (ACCU-CHEK MULTICLIX) lancets Use to test blood sugar 1 times daily or as directed. (Patient not taking: Reported on 5/10/2022) 102 each 1     Blood Pressure Monitoring (BLOOD PRESSURE CUFF) MISC 1 Units daily 1 each 0     buPROPion (WELLBUTRIN SR) 150 MG 12 hr tablet Take 1 tablet (150 mg)  by mouth 2 times daily 180 tablet 0     hydrOXYzine (ATARAX) 25 MG tablet Take 1 tablet (25 mg) by mouth 3 times daily as needed for anxiety 90 tablet 0     insulin pen needle (32G X 4 MM) 32G X 4 MM miscellaneous Use 1 pen needles daily or as directed. 30 each 11     liraglutide (VICTOZA) 18 MG/3ML solution Inject 0.6 mg Subcutaneous daily 3 mL 2     lisinopril (ZESTRIL) 10 MG tablet Take 1 tablet (10 mg) by mouth daily 30 tablet 2     metFORMIN (GLUCOPHAGE) 1000 MG tablet Take 1 tablet (1,000 mg) by mouth 2 times daily (with meals) 180 tablet 1       ALLERGIES:   No Known Allergies       ASSESSMENT:    Diabetes: Not at goal based on recent A1c, but it is a big step in the right direction to start Victoza today.  Pedro was able to successfully inject himself with Victoza and repeat back instructions for use.  Medication therapy problem: Needs additional education    Medication management: Not at goal since Pedro is missing most morning doses.  He will try to put morning pill box in a place where he will remember to use it.  In the future, should consider using metformin ER.Also might consider changing to bupropion ER or exploring an alternative medication for depression and anxiety.  Medication therapy problem: Unable to take medication as prescribed    All medications were reviewed and found to be indicated, effective, safe and convenient unless drug therapy problem identified as described above.    PLAN:     - Congratulated Pedro on successful use of Victoza.   - Directed Pedro to continue to use 0.6 mg Victoza daily for one week.  When he meets with Mirian in one week, VIctoza dose should be increased to 1.2 mg daily (assuming he is tolerating Victoza).  - Pedro will try to put morning pills in a location he remembers them. In the future, will likely transition to metformin ER and consider an alternative once daily medication for depression/anxiety that he can take in the evening.    Options for treatment and/or  follow-up care were reviewed with the patient. Pedro JACEK Noechan Funez was engaged and actively involved in the decision making process. He/She verbalized understanding of the options discussed and was satisfied with the final plan.    Patient was provided with written instructions/medication list via AVS.       Medical conditions reviewed: 2    Medications reviewed: 4    MTP identified: 2    Time spent: 20 minutes    Level of service: 3

## 2022-06-14 ENCOUNTER — OFFICE VISIT (OUTPATIENT)
Dept: FAMILY MEDICINE | Facility: CLINIC | Age: 32
End: 2022-06-14
Payer: COMMERCIAL

## 2022-06-14 VITALS
OXYGEN SATURATION: 96 % | BODY MASS INDEX: 35.99 KG/M2 | WEIGHT: 216 LBS | TEMPERATURE: 98.6 F | HEART RATE: 113 BPM | SYSTOLIC BLOOD PRESSURE: 124 MMHG | HEIGHT: 65 IN | DIASTOLIC BLOOD PRESSURE: 83 MMHG

## 2022-06-14 DIAGNOSIS — R80.9 PROTEINURIA, UNSPECIFIED TYPE: Primary | ICD-10-CM

## 2022-06-14 DIAGNOSIS — E11.65 TYPE 2 DIABETES MELLITUS WITH HYPERGLYCEMIA, WITHOUT LONG-TERM CURRENT USE OF INSULIN (H): ICD-10-CM

## 2022-06-14 LAB
CREAT UR-MCNC: 140 MG/DL
HBA1C MFR BLD: 9.4 %
MICROALBUMIN UR-MCNC: 88 MG/L
MICROALBUMIN/CREAT UR: 62.86 MG/G CR (ref 0–17)

## 2022-06-14 PROCEDURE — 82043 UR ALBUMIN QUANTITATIVE: CPT | Performed by: NURSE PRACTITIONER

## 2022-06-14 RX ORDER — LIRAGLUTIDE 6 MG/ML
1.2 INJECTION SUBCUTANEOUS DAILY
Qty: 3 ML | Refills: 2 | Status: SHIPPED | OUTPATIENT
Start: 2022-06-14 | End: 2023-04-11

## 2022-06-14 ASSESSMENT — PATIENT HEALTH QUESTIONNAIRE - PHQ9: SUM OF ALL RESPONSES TO PHQ QUESTIONS 1-9: 6

## 2022-06-14 NOTE — LETTER
Jenny 15, 2022      Pedro JACEK Denson Armin  [address]   [facility] [address]      Dear Mr.Siegl Funez,    We are writing to inform you of your test results.    Test results indicate you may require additional follow up, see comment below.    Resulted Orders   Hemoglobin A1c   Result Value Ref Range    Hemoglobin A1C 9.4 %   Albumin Random Urine Quantitative with Creat Ratio   Result Value Ref Range    Creatinine Urine mg/dL 140 mg/dL    Albumin Urine mg/L 88 mg/L    Albumin Urine mg/g Cr 62.86 (H) 0.00 - 17.00 mg/g Cr     Hi Pedro the protein in your urine has come down a little from previous. This will likely improve now that you are back on your medications. We will recheck in 3-6 months. Thank you.   If you have any questions or concerns, please call the clinic at the number listed above.       Sincerely,      JUAN ALBERTO Moncada CNP

## 2022-06-14 NOTE — PATIENT INSTRUCTIONS
Diabetes Follow up  A1c today  INcrease liraglutide to 1.2 mg daily.  THis is for your blood sugar  Will check on Blood Pressure cuff  Microalbumin for protein in urine check today  Follow up in 3 months    BP Cuff  [facility]  Hours Mon - Fri 8:00 a.m. - 4:30 p.m. Sat - Sun Closed www.[employer]    [place]  Address: [facility]  [address], [address] [place] [address]  Phone: [phone]  Fax: [phone]    [place]  Address: [facility]  [address], [address] [place] [address]  Phone: [phone]  Toll Free: [phone]    [place]  Address: [address], [address] [place] [address]  Phone: [phone]  Fax: [phone]    [place]  Address: [address] [place] [address]  Phone: [phone]  Fax: [phone]    [place]  Address: [address]  [address] [place] [address]  Phone: [phone]  Fax: [phone]    [place]  Address: [address], [address]  [place] [address]  Phone: [phone]  Toll Free: [phone]  Fax: [phone]    [place]  Address: [facility]  [address], [address] [place] [address]  Phone: [phone]  Fax: [phone]<

## 2022-06-14 NOTE — PROGRESS NOTES
"       HPI       Pedro Funez is a 31 year old  who presents for   Chief Complaint   Patient presents with     A1 C follow up      31 year-old male presents for diabetes follow up.  Is stressed today due to stress at residence with another resident. The resident has been verbally challenging over past 1 year and he worries about ecalating to physical actions. Reports resident follows him around when he is outside.  Has been working with staff in building.  Is considering getting a no contact order. Has . This has been escalating over the past year. Has caused increase in PHQ-9 and REBA-7.     Got liraglutide and is taking. Has not checked BS at home recently.  Feels better, is sleeping. Occasionally feels foggy in thinking, usually after eating especially if carb heavy.   Diet is improving by history. Moving away from Ramen noodles and adding vegetables. Eating less.  Has been doing a lot of walking. 1 hour walking 1-2 times per week on average. Tolerating liraglutide well. Noted mild loose stools but no other GI upset.  No problems with self-administering and has not had skin issues.       Problem, Medication and Allergy Lists were reviewed and updated if needed..    Patient is an established patient of this clinic..         Review of Systems:   Review of Systems  GEN: has been working on diet and has been losing weight.Denies fever, chills  CV: negative for chest pain, irregular heart beat  RESP: negative for cough, SOB  ENDO: as per HPI. Denies symptoms of hyper or hypoglycemi  DERM: negative for skin irritation secondary to injections  MOOD: as per HPI, increased stress as per HPI. States feels mostly safe. Has not had physical altercation. Plans to consider restraining order or similar       Physical Exam:     Vitals:    06/14/22 1418   BP: 124/83   Pulse: 113   Temp: 98.6  F (37  C)   TempSrc: Oral   SpO2: 96%   Weight: 98 kg (216 lb)   Height: 1.651 m (5' 5\")     Body mass index is 35.94 " kg/m .  Vital signs normal except    Down from weight 230 pounds 12/2021  Physical Exam  GEN: alert talkative male in NAD  MOOD: verbalizes stress secondary to living situation but no visible agitation. HR is increased  RESP: Lungs CTA  CV: HRRR, S1, S2, no MRG, negative peripheral edema  ABD: rounded soft   DERM: no erythema or lesions abdomen    Results:      Results from this visit  Results for orders placed or performed in visit on 06/14/22   Hemoglobin A1c     Status: None   Result Value Ref Range    Hemoglobin A1C 9.4 %       Assessment and Plan        1. Type 2 diabetes mellitus with hyperglycemia, without long-term current use of insulin (H)  Reviewed medications, asks for clarification on what liraglutide is for. Articulates understanding that will increase dose of liraglutide. Reviewed signs and symptoms hypo and hyperglycemia.  Continue other medications. Reinforced diet and exercise. Slight improvement in A1c, still high but not unexpected given that patient did not start liraglutide until recently and was not taking metformin for awhile either. I mailed him information about where to get healthy foods in vicinity for no or reduced cost and he acknowledges he received that. Gave him copy of RX for BP cuff and list of medical supply where he might pick it up as he does not have at home yet and pharmacy does not have.   - liraglutide (VICTOZA) 18 MG/3ML solution; Inject 1.2 mg Subcutaneous daily  Dispense: 3 mL; Refill: 2  - Hemoglobin A1c; Future  - Albumin Random Urine Quantitative with Creat Ratio; Future  - Hemoglobin A1c  - Albumin Random Urine Quantitative with Creat Ratio    2. Proteinuria, unspecified type  Was high in 12/2021 so will recheck today.   - Albumin Random Urine Quantitative with Creat Ratio; Future  - Albumin Random Urine Quantitative with Creat Ratio    Work with staff at residence to resolve current stressor with another resident in order to maintain safety.      Medications  Discontinued During This Encounter   Medication Reason     blood glucose monitoring (ACCU-CHEK FERMIN PLUS) meter device kit Medication Reconciliation Clean Up       Options for treatment and follow-up care were reviewed with the patient. Pedro Funez  engaged in the decision making process and verbalized understanding of the options discussed and agreed with the final plan.    JUAN ALBERTO Moncada CNP

## 2022-06-14 NOTE — LETTER
June 14, 2022      Pedro JACEK Denson Armin  [address]   [facility] [address]        Dear Mr.Siegl Funez,    We are writing to inform you of your test results.    Test results indicate you may require additional follow up, see comment below.    Resulted Orders   Hemoglobin A1c   Result Value Ref Range    Hemoglobin A1C 9.4 %     Hi Pedro. Your A1c (3 month blood sugar average is 9.4 so better than previous and will need to keep working on diet, exercise, weight loss. Liraglutide should help.  Thanks  If you have any questions or concerns, please call the clinic at the number listed above.       Sincerely,      JUAN ALBERTO Moncada CNP

## 2022-06-14 NOTE — NURSING NOTE
[facility]    Preferred Name: Pedro     31 year old  Chief Complaint   Patient presents with     A1 C follow up        Blood pressure 124/83, pulse 113, temperature 98.6  F (37  C), temperature source Oral, height 1.651 m (5' 5\"), weight 98 kg (216 lb), SpO2 96 %. Body mass index is 35.94 kg/m .  BP completed using cuff size:    Patient Active Problem List   Diagnosis     Diabetes mellitus, type 2 (H)     Morbid obesity (H)     MDD (major depressive disorder), recurrent episode, moderate (H)     Intertrigo     GERD (gastroesophageal reflux disease)     REBA (generalized anxiety disorder)     Essential hypertension     Autism spectrum disorder       Wt Readings from Last 2 Encounters:   22 98 kg (216 lb)   22 98 kg (216 lb)     BP Readings from Last 3 Encounters:   22 124/83   22 130/88   22 (!) 135/92       No Known Allergies    Current Outpatient Medications   Medication     blood glucose (NO BRAND SPECIFIED) lancets standard     blood glucose (NO BRAND SPECIFIED) test strip     blood glucose monitoring (ACCU-CHEK FERMIN PLUS) meter device kit     blood glucose monitoring (ACCU-CHEK MULTICLIX) lancets     Blood Pressure Monitoring (BLOOD PRESSURE CUFF) MISC     buPROPion (WELLBUTRIN SR) 150 MG 12 hr tablet     hydrOXYzine (ATARAX) 25 MG tablet     insulin pen needle (32G X 4 MM) 32G X 4 MM miscellaneous     liraglutide (VICTOZA) 18 MG/3ML solution     lisinopril (ZESTRIL) 10 MG tablet     metFORMIN (GLUCOPHAGE) 1000 MG tablet     No current facility-administered medications for this visit.       Social History     Tobacco Use     Smoking status: Former Smoker     Packs/day: 0.00     Years: 4.00     Pack years: 0.00     Types: Cigarettes     Quit date:      Years since quittin.4     Smokeless tobacco: Current User     Types: Chew   Vaping Use     Vaping Use: Never used   Substance Use Topics     Alcohol use: Yes     Comment: 1 beer periodically. Has binged in " the past     Drug use: No       Honoring Choices - Health Care Directive Guide offered to patient at time of visit.    Health Maintenance Due   Topic Date Due     ADVANCE CARE PLANNING  Never done     DEPRESSION ACTION PLAN  Never done     Pneumococcal Vaccine: Pediatrics (0 to 5 Years) and At-Risk Patients (6 to 64 Years) (1 - PCV) Never done     HIV SCREENING  Never done     HEPATITIS C SCREENING  Never done     HEPATITIS B IMMUNIZATION (1 of 3 - Risk 3-dose series) Never done     A1C  06/15/2022       Immunization History   Administered Date(s) Administered     COVID-19,PF,Miah 05/08/2021     TDAP Vaccine (Adacel) 03/22/2019       No results found for: PAP    Recent Labs   Lab Test 03/16/22  1417 03/15/22  1618 12/14/21  1618 08/17/21  1452   A1C  --  10.7 6.9* 6.9   LDL  --   --   --  124*   HDL  --   --   --  42   TRIG  --   --   --  119   ALT  --   --   --  40   CR 0.85  --   --  0.75   GFRESTIMATED >90  --   --  >90   ALBUMIN  --   --   --  4.0   POTASSIUM 5.0  --   --  4.1   TSH  --   --   --  1.39       PHQ-2 ( 1999 Pfizer) 6/14/2022 5/10/2022   Q1: Little interest or pleasure in doing things 0 0   Q2: Feeling down, depressed or hopeless 0 0   PHQ-2 Score 0 0   PHQ-2 Total Score (12-17 Years)- Positive if 3 or more points; Administer PHQ-A if positive - -       PHQ-9 SCORE 8/10/2021 9/7/2021 6/14/2022   PHQ-9 Total Score 13 2 6       REBA-7 SCORE 8/10/2021 9/7/2021   Total Score 16 2       No flowsheet data found.    Jordon Brumfield    June 14, 2022 2:20 PM

## 2022-06-30 ENCOUNTER — OFFICE VISIT (OUTPATIENT)
Dept: PHARMACY | Facility: CLINIC | Age: 32
End: 2022-06-30
Payer: COMMERCIAL

## 2022-06-30 ENCOUNTER — TELEPHONE (OUTPATIENT)
Dept: PHARMACY | Facility: CLINIC | Age: 32
End: 2022-06-30

## 2022-06-30 VITALS
WEIGHT: 216.7 LBS | OXYGEN SATURATION: 96 % | TEMPERATURE: 98 F | HEART RATE: 121 BPM | SYSTOLIC BLOOD PRESSURE: 130 MMHG | RESPIRATION RATE: 14 BRPM | BODY MASS INDEX: 34.82 KG/M2 | DIASTOLIC BLOOD PRESSURE: 80 MMHG | HEIGHT: 66 IN

## 2022-06-30 DIAGNOSIS — E11.9 DIABETES MELLITUS, TYPE 2 (H): Primary | ICD-10-CM

## 2022-06-30 DIAGNOSIS — F33.1 MDD (MAJOR DEPRESSIVE DISORDER), RECURRENT EPISODE, MODERATE (H): ICD-10-CM

## 2022-06-30 RX ORDER — METFORMIN HYDROCHLORIDE EXTENDED-RELEASE TABLETS 1000 MG/1
2000 TABLET, FILM COATED, EXTENDED RELEASE ORAL
Qty: 60 TABLET | Refills: 1 | Status: SHIPPED | OUTPATIENT
Start: 2022-06-30 | End: 2022-06-30

## 2022-06-30 RX ORDER — BUPROPION HYDROCHLORIDE 300 MG/1
300 TABLET ORAL EVERY MORNING
Qty: 30 TABLET | Refills: 1 | Status: SHIPPED | OUTPATIENT
Start: 2022-06-30 | End: 2023-07-11

## 2022-06-30 RX ORDER — METFORMIN HCL 500 MG
2000 TABLET, EXTENDED RELEASE 24 HR ORAL
Qty: 120 TABLET | Refills: 1 | Status: SHIPPED | OUTPATIENT
Start: 2022-06-30 | End: 2023-04-11

## 2022-06-30 NOTE — TELEPHONE ENCOUNTER
Full Medication Name: metFORMIN HCl ER (OSM) 1000 MG Oral Tablet Extended Release 24 Hour    Dose: See Chart    Question/Clarification needed: Pharmacy is calling to say this particular version is not covered by insurance. Can PharmD prescribe the normal extended release version?     Pharmacy confirmed as     [facility] [place], [place] - [address]  [address] [address]  Phone: [phone] Fax: [phone] Alternate Fax: [phone], [phone]  : Yes    Please leave ONLY preferred pharmacy    OK to leave a message on voice mail? Yes    Primary language: English      needed? No    Call taken on June 30, 2022 at 4:26 PM by Chanel Lerma CMA

## 2022-06-30 NOTE — NURSING NOTE
"ROOM:1  [facility]    Preferred Name: Pedro     31 year old  Chief Complaint   Patient presents with     RECHECK       Blood pressure 130/80, pulse (!) 121, temperature 98  F (36.7  C), temperature source Oral, resp. rate 14, height 1.679 m (5' 6.1\"), weight 98.3 kg (216 lb 11.2 oz), SpO2 96 %. Body mass index is 34.87 kg/m .  BP completed using cuff size:    Patient Active Problem List   Diagnosis     Diabetes mellitus, type 2 (H)     Morbid obesity (H)     MDD (major depressive disorder), recurrent episode, moderate (H)     Intertrigo     GERD (gastroesophageal reflux disease)     REBA (generalized anxiety disorder)     Essential hypertension     Autism spectrum disorder       Wt Readings from Last 2 Encounters:   22 98.3 kg (216 lb 11.2 oz)   22 98 kg (216 lb)     BP Readings from Last 3 Encounters:   22 130/80   22 124/83   22 130/88       No Known Allergies    Current Outpatient Medications   Medication     blood glucose (NO BRAND SPECIFIED) lancets standard     blood glucose (NO BRAND SPECIFIED) test strip     blood glucose monitoring (ACCU-CHEK FERMIN PLUS) meter device kit     blood glucose monitoring (ACCU-CHEK MULTICLIX) lancets     Blood Pressure Monitoring (BLOOD PRESSURE CUFF) MISC     buPROPion (WELLBUTRIN SR) 150 MG 12 hr tablet     hydrOXYzine (ATARAX) 25 MG tablet     insulin pen needle (32G X 4 MM) 32G X 4 MM miscellaneous     liraglutide (VICTOZA) 18 MG/3ML solution     lisinopril (ZESTRIL) 10 MG tablet     metFORMIN (GLUCOPHAGE) 1000 MG tablet     No current facility-administered medications for this visit.       Social History     Tobacco Use     Smoking status: Former Smoker     Packs/day: 0.00     Years: 4.00     Pack years: 0.00     Types: Cigarettes     Quit date: 2020     Years since quittin.4     Smokeless tobacco: Current User     Types: Chew   Vaping Use     Vaping Use: Never used   Substance Use Topics     Alcohol use: Yes     Comment: 1 beer " periodically. Has binged in the past     Drug use: No       Honoring Choices - Health Care Directive Guide offered to patient at time of visit.    Health Maintenance Due   Topic Date Due     ADVANCE CARE PLANNING  Never done     DEPRESSION ACTION PLAN  Never done     Pneumococcal Vaccine: Pediatrics (0 to 5 Years) and At-Risk Patients (6 to 64 Years) (1 - PCV) Never done     HIV SCREENING  Never done     HEPATITIS C SCREENING  Never done     HEPATITIS B IMMUNIZATION (1 of 3 - Risk 3-dose series) Never done       Immunization History   Administered Date(s) Administered     COVID-19,PF,Miah 05/08/2021     TDAP Vaccine (Adacel) 03/22/2019       No results found for: PAP    Recent Labs   Lab Test 06/14/22  1456 03/16/22  1417 03/15/22  1618 12/14/21  1618 08/17/21  1452   A1C 9.4  --  10.7 6.9* 6.9   LDL  --   --   --   --  124*   HDL  --   --   --   --  42   TRIG  --   --   --   --  119   ALT  --   --   --   --  40   CR  --  0.85  --   --  0.75   GFRESTIMATED  --  >90  --   --  >90   ALBUMIN  --   --   --   --  4.0   POTASSIUM  --  5.0  --   --  4.1   TSH  --   --   --   --  1.39       PHQ-2 ( 1999 Pfizer) 6/14/2022 5/10/2022   Q1: Little interest or pleasure in doing things 0 0   Q2: Feeling down, depressed or hopeless 0 0   PHQ-2 Score 0 0   PHQ-2 Total Score (12-17 Years)- Positive if 3 or more points; Administer PHQ-A if positive - -       PHQ-9 SCORE 8/10/2021 9/7/2021 6/14/2022   PHQ-9 Total Score 13 2 6       REBA-7 SCORE 8/10/2021 9/7/2021   Total Score 16 2       No flowsheet data found.    Lucina Haq MA    June 30, 2022 2:12 PM

## 2022-06-30 NOTE — Clinical Note
"I saw Pedro today.  Changed over metformin and bupropion to XR formulations so he only needs to take each once a day.  He \"feels\" blood sugar is better, which is a good thing.  I encouraged him to monitor blood sugars more often.  Will keep you updated. - Nuria"

## 2022-06-30 NOTE — PROGRESS NOTES
Telephone/video visits are billed at different rates depending on your insurance coverage. During this emergency period, for some insurers they may be billed the same as an in-person visit.  Please reach out to your insurance provider with any questions.  If during the course of the call the physician/provider feels a telephone visit is not appropriate, you will not be charged for this service.    SUBJECTIVE: Pedro is a 31 year old male who was referred by Mirian Lo for [facility] services for medication management.      Medication use: Pedro notes that he is taking his medications more often.  It is helpful for him to see medications sitting out in the pill boxes. Despite this, he thinks he is missing pills about half the time. He feels he is taking his Victoza most of the time.      Diabetes: Taking Victoza 1.2 mg (increased at last visit) and metformin 1000 mg BID.  Denies stomach aches or nausea with Victoza dose increase.  He feels that he is sleeping better and attributes this to improved blood glucose. He has not taken his blood sugar recently since he has forgotten.     Mental health: Taking wellbutrin.  He feels mood has been improving.  He thinks that the Victoza has actually ehlped his mood.  He wonders if improved blood sugars have improved his mood.     Dry throat: He has a bit of a cough and feels that this is due to dry throat. He doesn't feel this is allergies. He wonders if he simply needs to drink more fluids in order to improve his cough/dry throat.    Environmental factors that impact patient: Difficulty with transportation and limited financial resources.      Patient Active Problem List   Diagnosis     Diabetes mellitus, type 2 (H)     Morbid obesity (H)     MDD (major depressive disorder), recurrent episode, moderate (H)     Intertrigo     GERD (gastroesophageal reflux disease)     REBA (generalized anxiety disorder)     Essential hypertension     Autism spectrum disorder        OBJECTIVE:  "    REBA-7 SCORE 8/10/2021 2021   Total Score 16 2       PHQ-9 SCORE 8/10/2021 2021 2022   PHQ-9 Total Score 13 2 6         VITALS:  BP Readings from Last 3 Encounters:   22 130/80   22 124/83   22 130/88           Weight:   Wt Readings from Last 1 Encounters:   22 98.3 kg (216 lb 11.2 oz)       Height:   Ht Readings from Last 1 Encounters:   22 1.679 m (5' 6.1\")       LABORATORY VALUES:   Last A1C:    Lab Results   Component Value Date    A1C 9.4 2022   .    Last Basic Metabolic Panel:  Lab Results   Component Value Date     2022      Lab Results   Component Value Date    POTASSIUM 5.0 2022     Lab Results   Component Value Date    CHLORIDE 98 2022     Lab Results   Component Value Date    RODY 9.6 2022     Lab Results   Component Value Date    CO2 28 2022     Lab Results   Component Value Date    BUN 19 2022     Lab Results   Component Value Date    CR 0.85 2022     Lab Results   Component Value Date     2022       Lipid Panel Labs  Lab Results   Component Value Date    CHOL 190 2021     Lab Results   Component Value Date    TRIG 119 2021     Lab Results   Component Value Date    HDL 42 2021     Lab Results   Component Value Date     2021       Hepatic Panel Labs  Lab Results   Component Value Date    AST 23 2021     Lab Results   Component Value Date    ALT 40 2021         SOCIAL AND FAMILY HISTORY  Social History     Tobacco Use     Smoking status: Former Smoker     Packs/day: 0.00     Years: 4.00     Pack years: 0.00     Types: Cigarettes     Quit date: 2020     Years since quittin.4     Smokeless tobacco: Current User     Types: Chew   Substance Use Topics     Alcohol use: Yes     Comment: 1 beer periodically. Has binged in the past    .  Most Recent Immunizations   Administered Date(s) Administered     COVID-19,PF,Miah 2021     TDAP Vaccine " (Adacel) 03/22/2019       CURRENT MEDICATIONS:   Current Outpatient Medications   Medication Sig Dispense Refill     blood glucose (NO BRAND SPECIFIED) lancets standard Use to test blood sugar one times daily or as directed. 100 lancet 1     blood glucose (NO BRAND SPECIFIED) test strip Use to test blood sugar 1 time daily or as directed. 200 strip 3     blood glucose monitoring (ACCU-CHEK FERMIN PLUS) meter device kit Use to test blood sugars one time daily or as directed. 1 kit 0     blood glucose monitoring (ACCU-CHEK MULTICLIX) lancets Use to test blood sugar 1 times daily or as directed. 102 each 1     Blood Pressure Monitoring (BLOOD PRESSURE CUFF) MISC 1 Units daily 1 each 0     buPROPion (WELLBUTRIN SR) 150 MG 12 hr tablet Take 1 tablet (150 mg) by mouth 2 times daily 180 tablet 0     hydrOXYzine (ATARAX) 25 MG tablet Take 1 tablet (25 mg) by mouth 3 times daily as needed for anxiety 90 tablet 0     insulin pen needle (32G X 4 MM) 32G X 4 MM miscellaneous Use 1 pen needles daily or as directed. 30 each 11     liraglutide (VICTOZA) 18 MG/3ML solution Inject 1.2 mg Subcutaneous daily 3 mL 2     lisinopril (ZESTRIL) 10 MG tablet Take 1 tablet (10 mg) by mouth daily 30 tablet 2     metFORMIN (GLUCOPHAGE) 1000 MG tablet Take 1 tablet (1,000 mg) by mouth 2 times daily (with meals) 180 tablet 1       ALLERGIES:   No Known Allergies       ASSESSMENT:    Medication use: Not at goal.  Will need to continue to work on taking medications regularly.  It may be useful to reduce the number of times a day Pedro needs to take medications by using longer acting medications.    Diabetes: Not at goal based on recent A1c. It will be best for Pedro to monitor blood glucose more regularly.  This will help us assess potential future increases in liraglutide dose. Additionally, Pedro agrees that he will likely be able to take metformin more regularly is it is dosed once daily.  Medication therapy problem:  adherence/convenience    Mental health: At goal per patient. However, he often misses bupropion and feels it would be better to take once daily  Medication therapy problem: adherence/convenience    Dry throat: We agreed it might be a good first step to drink more water, per Pedro's idea.         All medications were reviewed and found to be indicated, effective, safe and convenient unless drug therapy problem identified as described above.    PLAN:     - Try to take your blood sugar once a day to a couple of times a week.   - I sent in a new prescription for metformin.  I want you to take 2000 mg a day.  The new prescription is for extended release.  You can take four pills of the 500 mg extended release tablets all at the same time with a meal.   - I sent in a new prescription for wellbutrin.  You can take one pill in the morning.  This replaces your current prescription of wellbutrin.     Options for treatment and/or follow-up care were reviewed with the patient. Pedro Funez was engaged and actively involved in the decision making process. He/She verbalized understanding of the options discussed and was satisfied with the final plan.    Patient was provided with written instructions/medication list via AVS.       Medical conditions reviewed: 4    Medications reviewed: 5    MTP identified: 2    Time spent: 30 minutes    Level of service: 3

## 2022-06-30 NOTE — PATIENT INSTRUCTIONS
- Try to take your blood sugar once a day to a couple of times a week.   - I sent in a new prescription for metformin.  I want you to take 2000 mg a day.  The new prescription is for extended release.  You can take four pills of the 500 mg extended release tablets all at the same time with a meal.   - I sent in a new prescription for wellbutrin.  You can take one pill in the morning.  This replaces your current prescription of wellbutrin.

## 2022-08-02 ENCOUNTER — OFFICE VISIT (OUTPATIENT)
Dept: PHARMACY | Facility: CLINIC | Age: 32
End: 2022-08-02

## 2022-08-02 VITALS — HEART RATE: 103 BPM | DIASTOLIC BLOOD PRESSURE: 82 MMHG | SYSTOLIC BLOOD PRESSURE: 111 MMHG

## 2022-08-02 DIAGNOSIS — L30.9 ECZEMA: Primary | ICD-10-CM

## 2022-08-02 NOTE — Clinical Note
Jet Vela, As you know I saw Pedro yesterday.  He is improving some with taking medications consistently since he is using a pill box.  Last visit, I sent in extended release meds so he would only need to take meds once a day, but he has not picked those up.  Medication use is improving in some ways, but still a work in progress.  He'll come in  in 6 weeks to see both you and me and get an A1c. - Nuria

## 2022-08-02 NOTE — PATIENT INSTRUCTIONS
- Keep up the good work with walking.  See if you can get to 30 minutes 5 days a week of walking.  - Talk to your  about getting on a health plan that would allow you not to have copays on your medications  -  your new metformin and wellbutrin.  These will now be once a day instead of twice daily  - Consider putting a reminder to take your victoza into your pill box each day.  - I sent in a prescription for Eucerin.  If this isn't covered by your insurance, you can buy it over the counter.  Take it twice daily. If the eczema is not gettting better, please make an appointment with one of our NPs.

## 2022-08-02 NOTE — PROGRESS NOTES
SUBJECTIVE: Pedro is a 32 year old male who was referred by Mirian Lo for [facility] services for  medication management.     Medication Use:  Pedro has not picked up new prescriptions that were sent to pharmacy at prior visit (06/30/22) for metformin and bupropion that were switched to extended release, once daily regimens. He states he will get them today or tomorrow and that the co-pay is a big factor in not picking them up. He says using a pill box is helping him improve adherence and he misses around 25-50% of his doses. He mostly misses his morning doses (~3/7 days per week) due to his sleep schedule and feeling rushed.     Diabetes:  Taking Victoza 1.2 mg QD and metformin 1000 mg BID. States he takes his Victoza 2-3 times per week and declines side effects from it or the metformin. He reported checking his blood glucose twice within the past few weeks and the readings were 190 and 202. He also reports he walks 2-3 miles a day 2-3 times per week and shows interest in getting a gym membership He states he has been eating healthier lately with 'less carbs, more protein and veggies.' He is motivated to get his blood sugars under control to improve his sleep, overall health and for weight loss.     Mental Health:  Taking bupropion 150 mg BID, does not report any concerns or changes about his mental health.     Hypertension:   Patient does not report any concerns or changes and states he takes his lisinopril in the evening.    Eczema:  Patient states he is experiencing a flare on his hands that is causing symptoms of flaking and itchy skin. He wonders if he should use a medication. He has used Aveeno in the past.    Environmental factors that impact patient: Difficulty with transportation and limited financial resources.    Patient Active Problem List   Diagnosis     Diabetes mellitus, type 2 (H)     Morbid obesity (H)     MDD (major depressive disorder), recurrent episode, moderate (H)     Intertrigo     GERD  "(gastroesophageal reflux disease)     REBA (generalized anxiety disorder)     Essential hypertension     Autism spectrum disorder        OBJECTIVE:     REBA-7 SCORE 8/10/2021 2021   Total Score 16 2       PHQ-9 SCORE 8/10/2021 2021 2022   PHQ-9 Total Score 13 2 6         VITALS:  BP Readings from Last 3 Encounters:   22 111/82   22 130/80   22 124/83           Weight:   Wt Readings from Last 1 Encounters:   22 98.3 kg (216 lb 11.2 oz)       Height:   Ht Readings from Last 1 Encounters:   22 1.679 m (5' 6.1\")       LABORATORY VALUES:   Last A1C:    Lab Results   Component Value Date    A1C 9.4 2022   .    Last Basic Metabolic Panel:  Lab Results   Component Value Date     2022      Lab Results   Component Value Date    POTASSIUM 5.0 2022     Lab Results   Component Value Date    CHLORIDE 98 2022     Lab Results   Component Value Date    RODY 9.6 2022     Lab Results   Component Value Date    CO2 28 2022     Lab Results   Component Value Date    BUN 19 2022     Lab Results   Component Value Date    CR 0.85 2022     Lab Results   Component Value Date     2022       Lipid Panel Labs  Lab Results   Component Value Date    CHOL 190 2021     Lab Results   Component Value Date    TRIG 119 2021     Lab Results   Component Value Date    HDL 42 2021     Lab Results   Component Value Date     2021       Hepatic Panel Labs  Lab Results   Component Value Date    AST 23 2021     Lab Results   Component Value Date    ALT 40 2021         SOCIAL AND FAMILY HISTORY  Social History     Tobacco Use     Smoking status: Former Smoker     Packs/day: 0.00     Years: 4.00     Pack years: 0.00     Types: Cigarettes     Quit date: 2020     Years since quittin.5     Smokeless tobacco: Current User     Types: Chew   Substance Use Topics     Alcohol use: Yes     Comment: 1 beer periodically. Has " binged in the past    .  Most Recent Immunizations   Administered Date(s) Administered     COVID-19,PF,Miah 05/08/2021     TDAP Vaccine (Adacel) 03/22/2019       CURRENT MEDICATIONS:   Current Outpatient Medications   Medication Sig Dispense Refill     Skin Protectants, Misc. (EUCERIN) cream Apply topically 2 times daily as needed for dry skin 240 g 1     blood glucose (NO BRAND SPECIFIED) lancets standard Use to test blood sugar one times daily or as directed. 100 lancet 1     blood glucose (NO BRAND SPECIFIED) test strip Use to test blood sugar 1 time daily or as directed. 200 strip 3     blood glucose monitoring (ACCU-CHEK FERMIN PLUS) meter device kit Use to test blood sugars one time daily or as directed. 1 kit 0     blood glucose monitoring (ACCU-CHEK MULTICLIX) lancets Use to test blood sugar 1 times daily or as directed. 102 each 1     Blood Pressure Monitoring (BLOOD PRESSURE CUFF) MISC 1 Units daily 1 each 0     buPROPion (WELLBUTRIN XL) 300 MG 24 hr tablet Take 1 tablet (300 mg) by mouth every morning 30 tablet 1     hydrOXYzine (ATARAX) 25 MG tablet Take 1 tablet (25 mg) by mouth 3 times daily as needed for anxiety 90 tablet 0     insulin pen needle (32G X 4 MM) 32G X 4 MM miscellaneous Use 1 pen needles daily or as directed. 30 each 11     liraglutide (VICTOZA) 18 MG/3ML solution Inject 1.2 mg Subcutaneous daily 3 mL 2     lisinopril (ZESTRIL) 10 MG tablet Take 1 tablet (10 mg) by mouth daily 30 tablet 2     metFORMIN (GLUCOPHAGE XR) 500 MG 24 hr tablet Take 4 tablets (2,000 mg) by mouth daily (with dinner) 120 tablet 1       ALLERGIES:   No Known Allergies       ASSESSMENT:    Medication use: Not at goal. Multiple ideas to improve adherence were discussed. Pedro should  his new extended release prescriptions so he can switch to once daily doses in the evenings. Also discussed getting an radha for daily reminders to take medication. Pedro would benefit from insurance that has no co-pays for  medications, which would also allow him to get prescription delivery. He can follow up with his  for more information.     Diabetes: Not at goal based on recent A1c and limited self blood glucose monitoring. Suggested he put a reminder in his pill box to remind him to take the Victoza (for instance, if there is no risk of swallowing pen needle, some patients put the pen needle in their pill box or besides their pill box as a reminder).Could benefit from writing motivations around improving health down as a reminder. Also discussed future use and benefits of CGM, could help increase adherence/ motivation. Pedro agreed to think it over, however there are current barriers of one more copay. He would benefit from continuing to increase his physical activity by walking more frequently in the week.   Medication therapy problem: adherence/convenience    Mental health: At goal per patient. Pedro would benefit from getting his new once daily bupropion prescription. He can take in evening to improve adherence if he does not experience side effect of insomnia.  Medication therapy problem: adherence/convenience    Eczema: Not at goal, recently identified problem. Dr. Lo shortly assessed patient and recommended an occlusive, topical agent such as Eucerin.   Medication therapy problem: indication, needs additional drug therapy     All medications were reviewed and found to be indicated, effective, safe and convenient unless drug therapy problem identified as described above.    PLAN:   -Keep up the good work with walking.  See if you can get to 30 minutes 5 days a week of walking.  - Talk to your  about getting on a health plan that would allow you not to have copays on your medications  -  your new metformin and wellbutrin. These will now be once a day instead of twice daily  - Consider putting a reminder to take your victoza into your pill box each day. Follow up at next visit with radha  information    - I sent in a prescription for Eucerin.  If this isn't covered by your insurance, you can buy it over the counter.  Take it twice daily. If the eczema is not gettting better, please make an appointment with one of our NPs  - At next visit, revisit consideration of CGM  - Follow up in 6 weeks with co-visit with both Mirian Lo and Nuria Avitia    Options for treatment and/or follow-up care were reviewed with the patient. Pedro Funez was engaged and actively involved in the decision making process. He/She verbalized understanding of the options discussed and was satisfied with the final plan.    Patient was provided with written instructions/medication list via AVS.       Medical conditions reviewed: 4    Medications reviewed:  6    MTP identified: 3    Time spent: 30 minutes    Level of service: 4

## 2022-08-04 ENCOUNTER — TELEPHONE (OUTPATIENT)
Dept: PHARMACY | Facility: CLINIC | Age: 32
End: 2022-08-04

## 2022-08-04 NOTE — TELEPHONE ENCOUNTER
Called patient to follow up on discussion around medication reminder radha.  Left message and recommended [employer].

## 2022-10-04 ENCOUNTER — OFFICE VISIT (OUTPATIENT)
Dept: FAMILY MEDICINE | Facility: CLINIC | Age: 32
End: 2022-10-04
Payer: MEDICARE

## 2022-10-04 VITALS
HEIGHT: 66 IN | BODY MASS INDEX: 34.23 KG/M2 | HEART RATE: 100 BPM | TEMPERATURE: 97.9 F | SYSTOLIC BLOOD PRESSURE: 123 MMHG | WEIGHT: 213 LBS | OXYGEN SATURATION: 96 % | DIASTOLIC BLOOD PRESSURE: 86 MMHG

## 2022-10-04 DIAGNOSIS — Z11.59 NEED FOR HEPATITIS C SCREENING TEST: ICD-10-CM

## 2022-10-04 DIAGNOSIS — E11.65 TYPE 2 DIABETES MELLITUS WITH HYPERGLYCEMIA, WITHOUT LONG-TERM CURRENT USE OF INSULIN (H): ICD-10-CM

## 2022-10-04 DIAGNOSIS — Z23 FLU VACCINE NEED: ICD-10-CM

## 2022-10-04 DIAGNOSIS — Z11.4 ENCOUNTER FOR SCREENING FOR HIV: Primary | ICD-10-CM

## 2022-10-04 DIAGNOSIS — Z13.220 LIPID SCREENING: ICD-10-CM

## 2022-10-04 LAB
CHOLEST SERPL-MCNC: 258 MG/DL
HBA1C MFR BLD: 11.2 %
HDLC SERPL-MCNC: 35 MG/DL
LDLC SERPL CALC-MCNC: 167 MG/DL
NONHDLC SERPL-MCNC: 223 MG/DL
TRIGL SERPL-MCNC: 279 MG/DL

## 2022-10-04 PROCEDURE — 83036 HEMOGLOBIN GLYCOSYLATED A1C: CPT | Performed by: NURSE PRACTITIONER

## 2022-10-04 PROCEDURE — 87389 HIV-1 AG W/HIV-1&-2 AB AG IA: CPT | Performed by: NURSE PRACTITIONER

## 2022-10-04 PROCEDURE — 86803 HEPATITIS C AB TEST: CPT | Performed by: NURSE PRACTITIONER

## 2022-10-04 PROCEDURE — 80061 LIPID PANEL: CPT | Performed by: NURSE PRACTITIONER

## 2022-10-04 ASSESSMENT — ANXIETY QUESTIONNAIRES
3. WORRYING TOO MUCH ABOUT DIFFERENT THINGS: NOT AT ALL
2. NOT BEING ABLE TO STOP OR CONTROL WORRYING: NOT AT ALL
1. FEELING NERVOUS, ANXIOUS, OR ON EDGE: SEVERAL DAYS
7. FEELING AFRAID AS IF SOMETHING AWFUL MIGHT HAPPEN: NOT AT ALL
GAD7 TOTAL SCORE: 3
GAD7 TOTAL SCORE: 3
IF YOU CHECKED OFF ANY PROBLEMS ON THIS QUESTIONNAIRE, HOW DIFFICULT HAVE THESE PROBLEMS MADE IT FOR YOU TO DO YOUR WORK, TAKE CARE OF THINGS AT HOME, OR GET ALONG WITH OTHER PEOPLE: NOT DIFFICULT AT ALL
6. BECOMING EASILY ANNOYED OR IRRITABLE: SEVERAL DAYS
5. BEING SO RESTLESS THAT IT IS HARD TO SIT STILL: NOT AT ALL

## 2022-10-04 ASSESSMENT — PATIENT HEALTH QUESTIONNAIRE - PHQ9
5. POOR APPETITE OR OVEREATING: SEVERAL DAYS
SUM OF ALL RESPONSES TO PHQ QUESTIONS 1-9: 4

## 2022-10-04 NOTE — PROGRESS NOTES
"       HPI       Pedro Funez is a 32 year old  who presents for 3 month follow up for diabetes and hypertension management.   Chief Complaint   Patient presents with     3 month follow up      32 year-old male with difficulty with adherence to medication regimen.   Diabetes Follow-up    Patient is checking blood sugars: rarely.  Results range from low 200s, not over 250         -Last A1C was 9.4 on 6/14/22, will recheck today.   Lab Results   Component Value Date    A1C 9.4 06/14/2022    A1C 10.7 03/15/2022    A1C 6.9 12/14/2021    A1C 6.9 08/17/2021        Diabetic concerns: None and blood sugar frequently over 200    Chest Pain or exercise related calf pain (claudication): no      Symptoms of hypoglycemia (low blood sugar): dizzy, blurred vision, confusion- about 1 month ago. Resolved after eating a bag of chips. Did not check blood sugar at that time.     Paresthesias (numbness or burning in feet) or sores: No     Diabetic eye exam within the last year?: No, referral made today. Last exam 9/30/2021    Has been eating better and has lost weight.      Hypertension     Outpatient blood pressures are not being checked. Did not get BP Monitor previously ordered.      Chest Pain? :No     Heartbeat: felt fluttering in chest one time with exercise. Has been trying to walk or jog lightly several times per week.     Headache: No    Dizziness: No    Low Salt Diet: reports \"trying to monitor salt intake\"     Daily NSAID Use?No     Did patient take their HTN pills today/last night as usual?  Details: does not take routinely. Uncertain of last dose.       Last Basic Metabolic Panel:  Lab Results   Component Value Date     03/16/2022      Lab Results   Component Value Date    POTASSIUM 5.0 03/16/2022     Lab Results   Component Value Date    CHLORIDE 98 03/16/2022     Lab Results   Component Value Date    RODY 9.6 03/16/2022     Lab Results   Component Value Date    CO2 28 03/16/2022     Lab Results   Component " "Value Date    BUN 19 03/16/2022     Lab Results   Component Value Date    CR 0.85 03/16/2022     Lab Results   Component Value Date     03/16/2022       Adherence and Exercise  Medication side effects: no  How often is a medication missed? More than 3 times per week  Exercise:walking  1 day/week for an average of 30-45 minutes   Barriers to taking medications: \"Forgets\"    Problem, Medication and Allergy Lists were reviewed and updated if needed..    Patient is an established patient of this clinic..         Review of Systems:   Review of Systems   ROS  GEN: negative for fever, fatigue, malaise or weakness. Losing weight.    HEENT: negative for e irritation, ear pain, nasal congestion, rhinorrhea, sore throat. Positive for vision changes with exception of one hypoglycemic episode, see HPI.    RESP: negative for SOB, cough, wheeze  CV: negative for chest pain.  irregular heart beat (see above), negative peripheral edema  : negative for dysuria, urgency, frequency   NEURO: negative for dizziness, numbness or tingling   DERM: negative for bruises, lacerations. Has concern about lesion on left chest. Rash on hands almost cleared up. Less itchy.   MOOD: well controlled mood, improving depression, mild anxiety             Physical Exam:     Vitals:    10/04/22 1409   BP: 123/86   Pulse: 100   Temp: 97.9  F (36.6  C)   TempSrc: Oral   SpO2: 96%   Weight: 96.6 kg (213 lb)   Height: 1.676 m (5' 6\")     Body mass index is 34.38 kg/m .  Vitals were reviewed and were normal     Physical Exam  GEN: alert and in no distress  HEENT: PERRLA, EOM intact, appropriate light reflex, optic disk crisp bilateral, limited vascular exam     RESP:  Lung sounds clear, no adventitious lung sounds.   CV: S1,S2, regular heart beat. No murmur. Capillary refill <3seconds, warm, well perfused extremities, pulses +2 BUE and BLE.   NEURO: microfillamanet test negative for neuropathy. Alert and oriented. Appropriate affect.   DERM: no  " bruising, or wounds. Slight erythema palms of hands bilaterally, peeling skin left.  Has 2mm cherry hemangioma left breast. Checked feet- intact skin.   MOOD: GAD7, PHQ9 completed, see screenings.       Results:   Results are ordered and pending    Assessment and Plan        Pedro was seen today for 3 month follow up .    Diagnoses and all orders for this visit:    Type 2 diabetes mellitus with hyperglycemia, without long-term current use of insulin (H)  Patient seen for ongoing diabetes management with variably compliance. Patient last A1c 9.4 on 6/14/22. Will recheck today to gauge average. Patient has lost weight, is exercising more, and eating more balanced, low sodium diet. Does not routinely take prescribed medications. Discussed strategies to remember (pill box location or timer on phone). Talked about checking blood sugars daily in the morning and occasionally at night. Provided education about hypoglycemia symptoms and treatment with easily absorbed glucose, such as a hard candy. Last eye exam was on 9/21/21,  referral placed. Routine lipid, non-fasting, screening done today. Goal -160    Hypertension   Today blood pressure was 123/86. Patient reports taking prescribed lisinopril infrequently. Again, discussed medication routine, exercise, and well-rounded low sodium diet.     Encounter for screening for HIV  Routine screening done today.   -     HIV Antigen Antibody Combo    Flu vaccine need  Patient requested vaccine, given.   -     INFLUENZA VACCINE IM > 6 MONTHS VALENT IIV4 (AFLURIA/FLUZONE)    Need for hepatitis C screening test  Routine screening done today.   -     Hepatitis C Screen Reflex to HCV RNA Quant and Genotype; Future  -     Hepatitis C Screen Reflex to HCV RNA Quant and Genotype    Reassurance re: Skin lesions.      There are no discontinued medications.    Options for treatment and follow-up care were reviewed with the patient. Pedro Funez  engaged in the decision  making process and verbalized understanding of the options discussed and agreed with the final plan.    Radha Au), RN, BAN  Doctor of Nursing Practice Student  Class of 2024   [employer]  [employer]      I was present with the NPP student who participated in the service and in the documentation of the services provided. I have verified the history and personally performed the physical exam and medical decision making, as documented by the student and edited by me    JUAN ALBERTO Moncada CNP  10/04/22  3:56 PM        JUAN ALBERTO Moncada CNP

## 2022-10-04 NOTE — NURSING NOTE
"ROOM:1  MYRNA MARTINEZ    Preferred Name: Pedro      AGREED:          Flu     DECLINED:              32 year old  Chief Complaint   Patient presents with     3 month follow up        Blood pressure 123/86, pulse 100, temperature 97.9  F (36.6  C), temperature source Oral, height 1.676 m (5' 6\"), weight 96.6 kg (213 lb), SpO2 96 %. Body mass index is 34.38 kg/m .  BP completed using cuff size:        Patient Active Problem List   Diagnosis     Diabetes mellitus, type 2 (H)     Morbid obesity (H)     MDD (major depressive disorder), recurrent episode, moderate (H)     Intertrigo     GERD (gastroesophageal reflux disease)     REBA (generalized anxiety disorder)     Essential hypertension     Autism spectrum disorder       Wt Readings from Last 2 Encounters:   10/04/22 96.6 kg (213 lb)   22 98.3 kg (216 lb 11.2 oz)     BP Readings from Last 3 Encounters:   10/04/22 123/86   22 111/82   22 130/80       No Known Allergies    Current Outpatient Medications   Medication     blood glucose (NO BRAND SPECIFIED) lancets standard     blood glucose (NO BRAND SPECIFIED) test strip     blood glucose monitoring (ACCU-CHEK FERMIN PLUS) meter device kit     blood glucose monitoring (ACCU-CHEK MULTICLIX) lancets     Blood Pressure Monitoring (BLOOD PRESSURE CUFF) MISC     buPROPion (WELLBUTRIN XL) 300 MG 24 hr tablet     hydrOXYzine (ATARAX) 25 MG tablet     insulin pen needle (32G X 4 MM) 32G X 4 MM miscellaneous     liraglutide (VICTOZA) 18 MG/3ML solution     lisinopril (ZESTRIL) 10 MG tablet     metFORMIN (GLUCOPHAGE XR) 500 MG 24 hr tablet     Skin Protectants, Misc. (EUCERIN) cream     No current facility-administered medications for this visit.       Social History     Tobacco Use     Smoking status: Former Smoker     Packs/day: 0.00     Years: 4.00     Pack years: 0.00     Types: Cigarettes     Quit date:      Years since quittin.7     Smokeless tobacco: Current User     Types: Chew   Vaping " Use     Vaping Use: Never used   Substance Use Topics     Alcohol use: Yes     Comment: 1 beer periodically. Has binged in the past     Drug use: No       Honoring Choices - Health Care Directive Guide offered to patient at time of visit.    Health Maintenance Due   Topic Date Due     ADVANCE CARE PLANNING  Never done     DEPRESSION ACTION PLAN  Never done     Pneumococcal Vaccine: Pediatrics (0 to 5 Years) and At-Risk Patients (6 to 64 Years) (1 - PCV) Never done     HIV SCREENING  Never done     HEPATITIS C SCREENING  Never done     HEPATITIS B IMMUNIZATION (1 of 3 - Risk 3-dose series) Never done     COVID-19 Vaccine (3 - Booster for Miah series) 03/22/2022     MEDICARE ANNUAL WELLNESS VISIT  08/17/2022     LIPID  08/17/2022     DIABETIC FOOT EXAM  08/17/2022     INFLUENZA VACCINE (1) 09/01/2022     A1C  09/14/2022     EYE EXAM  09/30/2022       Immunization History   Administered Date(s) Administered     COVID-19,PF,Miah 05/08/2021     TDAP Vaccine (Adacel) 03/22/2019       No results found for: PAP    Recent Labs   Lab Test 06/14/22  1456 03/16/22  1417 03/15/22  1618 12/14/21  1618 08/17/21  1452   A1C 9.4  --  10.7 6.9* 6.9   LDL  --   --   --   --  124*   HDL  --   --   --   --  42   TRIG  --   --   --   --  119   ALT  --   --   --   --  40   CR  --  0.85  --   --  0.75   GFRESTIMATED  --  >90  --   --  >90   ALBUMIN  --   --   --   --  4.0   POTASSIUM  --  5.0  --   --  4.1   TSH  --   --   --   --  1.39       PHQ-2 ( 1999 Pfizer) 10/4/2022 6/14/2022   Q1: Little interest or pleasure in doing things 0 0   Q2: Feeling down, depressed or hopeless 0 0   PHQ-2 Score 0 0   PHQ-2 Total Score (12-17 Years)- Positive if 3 or more points; Administer PHQ-A if positive - -       PHQ-9 SCORE 8/10/2021 9/7/2021 6/14/2022   PHQ-9 Total Score 13 2 6       REBA-7 SCORE 8/10/2021 9/7/2021   Total Score 16 2       No flowsheet data found.    Jordon Brumfield    October 4, 2022 2:12 PM

## 2022-10-04 NOTE — LETTER
October 5, 2022      Pedro READ Janiya Armin  [address]   [facility] [address]        Dear Mr.Siegl Funez,    We are writing to inform you of your test results.    Please make an appointment with your provider to review or follow up on your test results.  Appointments can be made by calling [phone].    Resulted Orders   Lipid Profile   Result Value Ref Range    Cholesterol 258 (H) <200 mg/dL    Triglycerides 279 (H) <150 mg/dL    Direct Measure HDL 35 (L) >=40 mg/dL    LDL Cholesterol Calculated 167 (H) <=100 mg/dL    Non HDL Cholesterol 223 (H) <130 mg/dL    Narrative    Cholesterol  Desirable:  <200 mg/dL    Triglycerides  Normal:  Less than 150 mg/dL  Borderline High:  150-199 mg/dL  High:  200-499 mg/dL  Very High:  Greater than or equal to 500 mg/dL    Direct Measure HDL  Female:  Greater than or equal to 50 mg/dL   Male:  Greater than or equal to 40 mg/dL    LDL Cholesterol  Desirable:  <100mg/dL  Above Desirable:  100-129 mg/dL   Borderline High:  130-159 mg/dL   High:  160-189 mg/dL   Very High:  >= 190 mg/dL    Non HDL Cholesterol  Desirable:  130 mg/dL  Above Desirable:  130-159 mg/dL  Borderline High:  160-189 mg/dL  High:  190-219 mg/dL  Very High:  Greater than or equal to 220 mg/dL   Hepatitis C Screen Reflex to HCV RNA Quant and Genotype   Result Value Ref Range    Hepatitis C Antibody Nonreactive Nonreactive    Narrative    Assay performance characteristics have not been established for newborns, infants, and children.   Hemoglobin A1c   Result Value Ref Range    Hemoglobin A1C 11.2 (H) <5.7 %      Comment:      Normal <5.7%   Prediabetes 5.7-6.4%    Diabetes 6.5% or higher     Note: Adopted from ADA consensus guidelines.   HIV Antigen Antibody Combo   Result Value Ref Range    HIV Antigen Antibody Combo Nonreactive Nonreactive      Comment:      HIV-1 p24 Ag & HIV-1/HIV-2 Ab Not Detected     Hi Pedro, your lab results show your diabetes is under poor control. Your 3 month blood  sugar average has risen again so we really need to figure out how to address that.  Your cholesterol levels are also quite high, so working to increase vegetables and fruits, lean means, less fatty foods, fast foods and fried foods will be important. Please return to review and discuss. Thank you.   If you have any questions or concerns, please call the clinic at the number listed above.       Sincerely,      JUAN ALBERTO Moncada CNP

## 2022-10-04 NOTE — PATIENT INSTRUCTIONS
Diabetes   Recheck A1c and lipids   Pickup medications from pharmacy today   Try to find a routine for medication- pill box by computer, timer on phone   Continue to eat well rounded diet with fruits, vegetables, and protein. Low sodium. Good work on weight loss!  Take blood sugars daily in the morning. Try to take a couple at night. If concern for a blood sugar low, check then too.   Carry hard candy in case of low blood sugar. Feeling shaky, sweaty, foggy are all signs of low blood sugar.   Eye clinic to call to set up exam   Continue exercise, ideally daily for 30min     Hypertension   Medication routine  Pickup blood pressure cuff at pharmacy, take at least once a week, if able   Continue to eat well rounded diet with fruits, vegetables, and protein. Low sodium.     Flu vaccine  Given today    Follow up in three months.

## 2022-10-05 LAB
HCV AB SERPL QL IA: NONREACTIVE
HIV 1+2 AB+HIV1 P24 AG SERPL QL IA: NONREACTIVE

## 2023-04-11 ENCOUNTER — OFFICE VISIT (OUTPATIENT)
Dept: FAMILY MEDICINE | Facility: CLINIC | Age: 33
End: 2023-04-11
Payer: MEDICARE

## 2023-04-11 VITALS
BODY MASS INDEX: 33.67 KG/M2 | RESPIRATION RATE: 18 BRPM | OXYGEN SATURATION: 96 % | HEART RATE: 106 BPM | TEMPERATURE: 98.5 F | DIASTOLIC BLOOD PRESSURE: 89 MMHG | WEIGHT: 208.6 LBS | SYSTOLIC BLOOD PRESSURE: 127 MMHG

## 2023-04-11 DIAGNOSIS — I49.9 IRREGULAR HEART BEAT: ICD-10-CM

## 2023-04-11 DIAGNOSIS — F41.9 ANXIETY: ICD-10-CM

## 2023-04-11 DIAGNOSIS — Z13.220 LIPID SCREENING: ICD-10-CM

## 2023-04-11 DIAGNOSIS — I10 BENIGN ESSENTIAL HYPERTENSION: ICD-10-CM

## 2023-04-11 DIAGNOSIS — E66.811 CLASS 1 OBESITY WITH SERIOUS COMORBIDITY AND BODY MASS INDEX (BMI) OF 33.0 TO 33.9 IN ADULT, UNSPECIFIED OBESITY TYPE: ICD-10-CM

## 2023-04-11 DIAGNOSIS — E11.65 TYPE 2 DIABETES MELLITUS WITH HYPERGLYCEMIA, WITHOUT LONG-TERM CURRENT USE OF INSULIN (H): Primary | ICD-10-CM

## 2023-04-11 DIAGNOSIS — F33.1 MDD (MAJOR DEPRESSIVE DISORDER), RECURRENT EPISODE, MODERATE (H): ICD-10-CM

## 2023-04-11 LAB
ALBUMIN SERPL BCG-MCNC: 4.6 G/DL (ref 3.5–5.2)
ALP SERPL-CCNC: 90 U/L (ref 40–129)
ALT SERPL W P-5'-P-CCNC: 34 U/L (ref 10–50)
ANION GAP SERPL CALCULATED.3IONS-SCNC: 16 MMOL/L (ref 7–15)
AST SERPL W P-5'-P-CCNC: 21 U/L (ref 10–50)
BILIRUB SERPL-MCNC: 0.4 MG/DL
BUN SERPL-MCNC: 19.2 MG/DL (ref 6–20)
CALCIUM SERPL-MCNC: 9.9 MG/DL (ref 8.6–10)
CHLORIDE SERPL-SCNC: 99 MMOL/L (ref 98–107)
CHOLEST SERPL-MCNC: 250 MG/DL
CREAT SERPL-MCNC: 0.89 MG/DL (ref 0.67–1.17)
CREAT UR-MCNC: 58.3 MG/DL
DEPRECATED HCO3 PLAS-SCNC: 20 MMOL/L (ref 22–29)
ERYTHROCYTE [DISTWIDTH] IN BLOOD BY AUTOMATED COUNT: 12.6 % (ref 10–15)
GFR SERPL CREATININE-BSD FRML MDRD: >90 ML/MIN/1.73M2
GLUCOSE SERPL-MCNC: 421 MG/DL (ref 70–99)
HBA1C MFR BLD: 11 %
HCT VFR BLD AUTO: 47.6 % (ref 40–53)
HDLC SERPL-MCNC: 34 MG/DL
HGB BLD-MCNC: 15.6 G/DL (ref 13.3–17.7)
LDLC SERPL CALC-MCNC: 158 MG/DL
MCH RBC QN AUTO: 29.4 PG (ref 26.5–33)
MCHC RBC AUTO-ENTMCNC: 32.8 G/DL (ref 31.5–36.5)
MCV RBC AUTO: 90 FL (ref 78–100)
MICROALBUMIN UR-MCNC: 24.6 MG/L
MICROALBUMIN/CREAT UR: 42.2 MG/G CR (ref 0–17)
NONHDLC SERPL-MCNC: 216 MG/DL
PLATELET # BLD AUTO: 324 10E3/UL (ref 150–450)
POTASSIUM SERPL-SCNC: 4.9 MMOL/L (ref 3.4–5.3)
PROT SERPL-MCNC: 7.7 G/DL (ref 6.4–8.3)
RBC # BLD AUTO: 5.31 10E6/UL (ref 4.4–5.9)
SODIUM SERPL-SCNC: 135 MMOL/L (ref 136–145)
TRIGL SERPL-MCNC: 289 MG/DL
WBC # BLD AUTO: 10.4 10E3/UL (ref 4–11)

## 2023-04-11 PROCEDURE — 80053 COMPREHEN METABOLIC PANEL: CPT | Mod: ORL | Performed by: NURSE PRACTITIONER

## 2023-04-11 PROCEDURE — 80061 LIPID PANEL: CPT | Mod: ORL | Performed by: NURSE PRACTITIONER

## 2023-04-11 PROCEDURE — 83036 HEMOGLOBIN GLYCOSYLATED A1C: CPT | Mod: ORL | Performed by: NURSE PRACTITIONER

## 2023-04-11 PROCEDURE — 85027 COMPLETE CBC AUTOMATED: CPT | Mod: ORL | Performed by: NURSE PRACTITIONER

## 2023-04-11 PROCEDURE — 82570 ASSAY OF URINE CREATININE: CPT | Mod: ORL | Performed by: NURSE PRACTITIONER

## 2023-04-11 RX ORDER — HYDROXYZINE HYDROCHLORIDE 25 MG/1
25 TABLET, FILM COATED ORAL 3 TIMES DAILY PRN
Qty: 90 TABLET | Refills: 0 | Status: SHIPPED | OUTPATIENT
Start: 2023-04-11 | End: 2023-12-22

## 2023-04-11 RX ORDER — BUPROPION HYDROCHLORIDE 150 MG/1
150 TABLET ORAL EVERY MORNING
Qty: 30 TABLET | Refills: 1 | Status: SHIPPED | OUTPATIENT
Start: 2023-04-11 | End: 2023-07-11

## 2023-04-11 RX ORDER — LISINOPRIL 10 MG/1
10 TABLET ORAL DAILY
Qty: 30 TABLET | Refills: 2 | Status: SHIPPED | OUTPATIENT
Start: 2023-04-11 | End: 2023-07-11

## 2023-04-11 RX ORDER — METFORMIN HCL 500 MG
2000 TABLET, EXTENDED RELEASE 24 HR ORAL
Qty: 120 TABLET | Refills: 1 | Status: SHIPPED | OUTPATIENT
Start: 2023-04-11 | End: 2023-07-11

## 2023-04-11 RX ORDER — BUPROPION HYDROCHLORIDE 300 MG/1
300 TABLET ORAL EVERY MORNING
Qty: 30 TABLET | Refills: 1 | Status: CANCELLED | OUTPATIENT
Start: 2023-04-11

## 2023-04-11 RX ORDER — LIRAGLUTIDE 6 MG/ML
1.2 INJECTION SUBCUTANEOUS DAILY
Qty: 3 ML | Refills: 2 | Status: SHIPPED | OUTPATIENT
Start: 2023-04-11 | End: 2023-10-10

## 2023-04-11 ASSESSMENT — PAIN SCALES - GENERAL: PAINLEVEL: NO PAIN (0)

## 2023-04-11 NOTE — NURSING NOTE
[facility]    Preferred Name: Pedro     How did you hear about us?  Current Patient    32 year old  Chief Complaint   Patient presents with     Diabetes     Just checking in       Blood pressure 127/89, pulse 113, temperature 98.5  F (36.9  C), temperature source Oral, resp. rate 18, weight 94.6 kg (208 lb 9.6 oz), SpO2 96 %. Body mass index is 33.67 kg/m .  BP completed using cuff size:        Patient Active Problem List   Diagnosis     Diabetes mellitus, type 2 (H)     Morbid obesity (H)     MDD (major depressive disorder), recurrent episode, moderate (H)     Intertrigo     GERD (gastroesophageal reflux disease)     REBA (generalized anxiety disorder)     Essential hypertension     Autism spectrum disorder       Wt Readings from Last 2 Encounters:   04/11/23 94.6 kg (208 lb 9.6 oz)   10/04/22 96.6 kg (213 lb)     BP Readings from Last 3 Encounters:   04/11/23 127/89   10/04/22 123/86   08/02/22 111/82       No Known Allergies    Current Outpatient Medications   Medication     blood glucose (NO BRAND SPECIFIED) lancets standard     blood glucose (NO BRAND SPECIFIED) test strip     blood glucose monitoring (ACCU-CHEK FERMIN PLUS) meter device kit     blood glucose monitoring (ACCU-CHEK MULTICLIX) lancets     Blood Pressure Monitoring (BLOOD PRESSURE CUFF) MISC     buPROPion (WELLBUTRIN XL) 300 MG 24 hr tablet     hydrOXYzine (ATARAX) 25 MG tablet     insulin pen needle (32G X 4 MM) 32G X 4 MM miscellaneous     liraglutide (VICTOZA) 18 MG/3ML solution     lisinopril (ZESTRIL) 10 MG tablet     metFORMIN (GLUCOPHAGE XR) 500 MG 24 hr tablet     Skin Protectants, Misc. (EUCERIN) cream     No current facility-administered medications for this visit.       Social History     Tobacco Use     Smoking status: Former     Packs/day: 0.00     Years: 4.00     Pack years: 0.00     Types: Cigarettes     Quit date: 2020     Years since quitting: 3.2     Smokeless tobacco: Current     Types: Chew   Vaping Use      Vaping status: Never Used   Substance Use Topics     Alcohol use: Yes     Comment: 1 beer periodically. Has binged in the past     Drug use: No       Honoring Choices - Health Care Directive Guide offered to patient at time of visit.    Health Maintenance Due   Topic Date Due     ADVANCE CARE PLANNING  Never done     DEPRESSION ACTION PLAN  Never done     HEPATITIS B IMMUNIZATION (1 of 3 - 3-dose series) Never done     Pneumococcal Vaccine: Pediatrics (0 to 5 Years) and At-Risk Patients (6 to 64 Years) (1 - PCV) Never done     COVID-19 Vaccine (3 - Booster for Miah series) 03/22/2022     MEDICARE ANNUAL WELLNESS VISIT  08/17/2022     DIABETIC FOOT EXAM  08/17/2022     EYE EXAM  09/30/2022     A1C  01/04/2023     BMP  03/16/2023     PHQ-9  04/04/2023       Immunization History   Administered Date(s) Administered     COVID-19 Vaccine (Miah) 05/08/2021     Influenza Vaccine >6 months (Alfuria,Fluzone) 10/04/2022     TDAP Vaccine (Adacel) 03/22/2019       No results found for: PAP    Recent Labs   Lab Test 10/04/22  1531 06/14/22  1456 03/16/22  1417 03/15/22  1618 12/14/21  1618 08/17/21  1452   A1C 11.2* 9.4  --  10.7   < > 6.9   *  --   --   --   --  124*   HDL 35*  --   --   --   --  42   TRIG 279*  --   --   --   --  119   ALT  --   --   --   --   --  40   CR  --   --  0.85  --   --  0.75   GFRESTIMATED  --   --  >90  --   --  >90   ALBUMIN  --   --   --   --   --  4.0   POTASSIUM  --   --  5.0  --   --  4.1   TSH  --   --   --   --   --  1.39    < > = values in this interval not displayed.           4/11/2023     2:50 PM 10/4/2022     2:12 PM   PHQ-2 ( 1999 Pfizer)   Q1: Little interest or pleasure in doing things 0 0   Q2: Feeling down, depressed or hopeless 0 0   PHQ-2 Score 0 0           9/7/2021    11:28 AM 6/14/2022     2:09 PM 10/4/2022     2:36 PM 10/4/2022     2:42 PM   PHQ-9 SCORE   PHQ-9 Total Score 2 6 4 4           8/10/2021     4:24 PM 9/7/2021    11:28 AM 10/4/2022     2:42 PM   REBA-7  SCORE   Total Score 16 2 3            View : No data to display.                Radha Raymond, EMT    April 11, 2023 2:54 PM

## 2023-04-11 NOTE — PROGRESS NOTES
HPI       Pedro Funez is a 32 year old  who presents for   Chief Complaint   Patient presents with     Diabetes     Just checking in     Refill Request     32 year-old male with history type 2 DM. Last seen 10/22. Was previously started on liraglutide in addition to metformin but did not continue. Saw increasing A1c through summer and fall. Has recently started counseling and feeling ready to resume better control of DM.  Out of most medications x 1 month. More active again. Was attempting activity during winter months also: [place].    Diabetes Follow-up  Patient is checking blood sugars: rarely.  Results range from 140 to 160. Currently out of test strips.   Lab Results   Component Value Date    A1C 11.2 10/04/2022    A1C 9.4 06/14/2022    A1C 10.7 03/15/2022    A1C 6.9 12/14/2021    A1C 6.9 08/17/2021      Diabetic concerns: None  Chest Pain or exercise related calf pain (claudication):no   Symptoms of hypoglycemia (low blood sugar): none  Paresthesias (numbness or burning in feet) or sores: No  Diabetic eye exam within the last year?: No Reports he knows he should schedule that.  Diet: has cut down on carbohydrates and working on weight loss. Trying to eat more  Healthy.     Hypertension Follow-up  Goal <130/80  Outpatient blood pressures are not being checked.  Low Salt Diet: not monitoring salt but trying to not use   Did patient take their HTN pills today?  No Out of lisinopril x 1 month.   Last Basic Metabolic Panel:  Lab Results   Component Value Date     03/16/2022      Lab Results   Component Value Date    POTASSIUM 5.0 03/16/2022     Lab Results   Component Value Date    CHLORIDE 98 03/16/2022     Lab Results   Component Value Date    RODY 9.6 03/16/2022     Lab Results   Component Value Date    CO2 28 03/16/2022     Lab Results   Component Value Date    BUN 19 03/16/2022     Lab Results   Component Value Date    CR 0.85 03/16/2022     Lab Results   Component Value Date     " 03/16/2022         Adherence and Exercise  Medication side effects: no  How often is a medication missed? About 1-3 times per week. Currently daily, as out.   Exercise:walking  4-5 days/week for an average of 30-45 minutes     Problem, Medication and Allergy Lists were reviewed and updated if needed..    Patient is an established patient of this clinic..         Review of Systems:   Review of Systems  Gen: feeling well overall today, denies fatigue, fever, chills    Resp: denies SOB, cough, or wheeze    CV: denies chest pain or palpitations. Does report irregular heartbeat with exercise, self terminates. This has happened a few times. Denies chest pain or dizziness with episodes.   Neuro: denies numbness or tingling. Reports some headaches and migraines, nothing new   Endo: denies having hypoglycemia episodes since last visit. Reports some increase in thirst but thinks its a \"normal amount\". Drinks 2-3L water a day. Reports some increase in urination but is directly related to amount of water.   Skin: denies open cuts or lesions. Reports having a recent cut on leg but monitored and healed fast   Mood: reports starting therapy and has really enjoyed this. Mood has been \"great\". Not taking wellbutrin because \"ran out\". Would like to resume.  Denies thoughts of selfharm. Discussed diagnosis in chart of Autism. Reports he was diagnosed with this in the past.           Physical Exam:     Vitals:    04/11/23 1451 04/11/23 1627   BP: 127/89    BP Location: Left arm    Patient Position: Sitting    Cuff Size: Adult Regular    Pulse: 113 106   Resp: 18    Temp: 98.5  F (36.9  C)    TempSrc: Oral    SpO2: 96%    Weight: 94.6 kg (208 lb 9.6 oz)      Body mass index is 33.67 kg/m .  Vital signs normal except pulse 113, will recheck. Recheck 106bpm     Physical Exam  Gen: alert, oriented. In no acute distress.   Resp: lung sounds clear to ascultation bilaterally. No cough   CV: S1, S2. No S3, S4. No murmur, click, or " gallop. Negative peripheral edema.   Neuro: DM FOOT EXAM: sensation intact to monofilament on toes, dorsal and ventral surfaces and heels. Pink, warm to touch. Pedal pulses palpable bilaterally.   Skin: skin intact on bilateral feet. Some mild redness to left great  Toe medial aspect along nail but no open sore or lesion. Slight callous heels bilaterally. Some of toenails are not cut straight across. Thickened toenails.  Mood: bright affect, good eye contact. Appropriate to conversation.       Results:   Results are ordered and pending    Assessment and Plan        1. (E11.65) Type 2 diabetes mellitus with hyperglycemia, without long-term current use of insulin (H)  (primary encounter diagnosis)  Comments: Previous A1c elevated at 11.2 from 10/2022. Patient endorses taking BS at home on occasion and they range from 140-160. Reports taking most medications most of the time. The liraglutide is hard to remember to keep in the fridge but will work on remembering. Does request refill of these medications today in addition to lancets and test strips. Denies hypoglycemia. Is working on exercise and diet control. Has had some weight loss since last visit (5kg).   Plan: liraglutide (VICTOZA) 18 MG/3ML solution,         metFORMIN (GLUCOPHAGE XR) 500 MG 24 hr tablet,         Hemoglobin A1c, blood glucose (NO BRAND         SPECIFIED) test strip, blood glucose (NO BRAND         SPECIFIED) lancets standard, FOOT EXAM  NO         CHARGE [16390.114]    2. (I10) Benign essential hypertension  Comment: patient reports having run out of lisinopril about 1 month ago. BP today 127/89. Will refill lisinopril to take daily. Patient working on low fat, low sodium diet. Is exercising more as noted in problem 1 Monitor HR. GOAL /80  Plan: lisinopril (ZESTRIL) 10 MG tablet, CBC with         platelets, Comprehensive metabolic panel,         Albumin Random Urine Quantitative with Creat         Ratio    3. (F33.1) MDD (major depressive  disorder), recurrent episode, moderate (H)  Comment: started to see therapist weekly. Notes this to be a great thing. Had stopped taking Wellbutrin due to running out of mediation. Was previously on 300mg daily. Will refill 150mg today and can increase in a week or two as needed.   Plan: buPROPion (WELLBUTRIN XL) 150 MG 24 hr tablet    4. (F41.9) Anxiety  Comment: seeing therapy. Reports not needing PRN dose   Plan: hydrOXYzine (ATARAX) 25 MG tablet    5. (Z13.220) Lipid screening  Comment: last screening complete 1 year ago. Patient reports elevated lipids even in childhood. Will continue to consider a statin for hyperlipidemia. Have not considered in past due to age   Plan: lipid panel     6. (E66.9,  Z68.33) Class 1 obesity with serious comorbidity and body mass index (BMI) of 33.0 to 33.9 in adult, unspecified obesity type  Comment: some weight loss since 10/2022. Educated on lifestyle and diet. Will check lipids today as well, see problem 5. Reinforced current efforts at weight loss, diet and exercise.   Plan: Lipid panel, Comprehensive metabolic panel    7. (I49.9) Irregular heart beat  Comment: patient reports some irregular heartbeat with activity. Will obtain Ziopatch to assess for arrhythmia. Not accompanied by chest pain by report, but some risk given obesity., DM 2  Plan: Adult Leadless EKG Monitor 3 to 7 Days          Medications Discontinued During This Encounter   Medication Reason     lisinopril (ZESTRIL) 10 MG tablet Reorder (No AVS / No eCancel)     hydrOXYzine (ATARAX) 25 MG tablet Reorder (No AVS / No eCancel)     blood glucose (NO BRAND SPECIFIED) lancets standard Reorder (No AVS / No eCancel)     liraglutide (VICTOZA) 18 MG/3ML solution Reorder (No AVS / No eCancel)     metFORMIN (GLUCOPHAGE XR) 500 MG 24 hr tablet Reorder (No AVS / No eCancel)       Please return to clinic in one week to follow up on lab results and medication plan.     Options for treatment and follow-up care were reviewed  with the patient. Pedro Funez  engaged in the decision making process and verbalized understanding of the options discussed and agreed with the final plan.    Radha Au), RN, BAN  Doctor of Nursing Practice Student  Class of 2024   [facility]  [employer] of [employer]     Forms completed for services. Request staff to fax.     JUAN ALBERTO Moncada CNP

## 2023-04-11 NOTE — LETTER
April 14, 2023      Pedro Funez  [address]   [facility] [address]        Dear Mr.Siegl Funez,    We are writing to inform you of your test results.    Test results indicate you may require additional follow up, see comment below. Why don't you schedule a follow up visit and we can review those.  Maybe you can meet with the pharmacist at the same time.     Resulted Orders   Hemoglobin A1c   Result Value Ref Range    Hemoglobin A1C 11.0 (H) <5.7 %      Comment:      Normal <5.7%   Prediabetes 5.7-6.4%    Diabetes 6.5% or higher     Note: Adopted from ADA consensus guidelines.   Lipid panel   Result Value Ref Range    Cholesterol 250 (H) <200 mg/dL    Triglycerides 289 (H) <150 mg/dL    Direct Measure HDL 34 (L) >=40 mg/dL    LDL Cholesterol Calculated 158 (H) <=100 mg/dL    Non HDL Cholesterol 216 (H) <130 mg/dL    Narrative    Cholesterol  Desirable:  <200 mg/dL    Triglycerides  Normal:  Less than 150 mg/dL  Borderline High:  150-199 mg/dL  High:  200-499 mg/dL  Very High:  Greater than or equal to 500 mg/dL    Direct Measure HDL  Female:  Greater than or equal to 50 mg/dL   Male:  Greater than or equal to 40 mg/dL    LDL Cholesterol  Desirable:  <100mg/dL  Above Desirable:  100-129 mg/dL   Borderline High:  130-159 mg/dL   High:  160-189 mg/dL   Very High:  >= 190 mg/dL    Non HDL Cholesterol  Desirable:  130 mg/dL  Above Desirable:  130-159 mg/dL  Borderline High:  160-189 mg/dL  High:  190-219 mg/dL  Very High:  Greater than or equal to 220 mg/dL   CBC with platelets   Result Value Ref Range    WBC Count 10.4 4.0 - 11.0 10e3/uL    RBC Count 5.31 4.40 - 5.90 10e6/uL    Hemoglobin 15.6 13.3 - 17.7 g/dL    Hematocrit 47.6 40.0 - 53.0 %    MCV 90 78 - 100 fL    MCH 29.4 26.5 - 33.0 pg    MCHC 32.8 31.5 - 36.5 g/dL    RDW 12.6 10.0 - 15.0 %    Platelet Count 324 150 - 450 10e3/uL   Comprehensive metabolic panel   Result Value Ref Range    Sodium 135 (L) 136 - 145 mmol/L    Potassium 4.9 3.4 -  5.3 mmol/L    Chloride 99 98 - 107 mmol/L    Carbon Dioxide (CO2) 20 (L) 22 - 29 mmol/L    Anion Gap 16 (H) 7 - 15 mmol/L    Urea Nitrogen 19.2 6.0 - 20.0 mg/dL    Creatinine 0.89 0.67 - 1.17 mg/dL    Calcium 9.9 8.6 - 10.0 mg/dL    Glucose 421 (H) 70 - 99 mg/dL    Alkaline Phosphatase 90 40 - 129 U/L    AST 21 10 - 50 U/L    ALT 34 10 - 50 U/L    Protein Total 7.7 6.4 - 8.3 g/dL    Albumin 4.6 3.5 - 5.2 g/dL    Bilirubin Total 0.4 <=1.2 mg/dL    GFR Estimate >90 >60 mL/min/1.73m2      Comment:      eGFR calculated using 2021 CKD-EPI equation.   Albumin Random Urine Quantitative with Creat Ratio   Result Value Ref Range    Creatinine Urine mg/dL 58.3 mg/dL      Comment:      The reference ranges have not been established in urine creatinine. The results should be integrated into the clinical context for interpretation.    Albumin Urine mg/L 24.6 mg/L      Comment:      The reference ranges have not been established in urine albumin. The results should be integrated into the clinical context for interpretation.    Albumin Urine mg/g Cr 42.20 (H) 0.00 - 17.00 mg/g Cr      Comment:      Microalbuminuria is defined as an albumin:creatinine ratio of 17 to 299 for males and 25 to 299 for females. A ratio of albumin:creatinine of 300 or higher is indicative of overt proteinuria.  Due to biologic variability, positive results should be confirmed by a second, first-morning random or 24-hour timed urine specimen. If there is discrepancy, a third specimen is recommended. When 2 out of 3 results are in the microalbuminuria range, this is evidence for incipient nephropathy and warrants increased efforts at glucose control, blood pressure control, and institution of therapy with an angiotensin-converting-enzyme (ACE) inhibitor (if the patient can tolerate it).       Hi Pedro, here are your lab results. Your complete blood count is normal. Your 3 month blood sguar average has come down a little bit, but your blood sugar is still  very santana. I will talk with Nuria the pharmacist about considering insulin again, because I know that worked for you in the past. I do think the liraglutide could work too.  It will be important for you to keep working on healthy diet with a lot of vegetables, daily walking and weight loss.It sounded like you were motivated to get on track in better managing your diabetes. I am going to refill your medications and consult with Nuria, the pharmacist about your medications. Losing weight and changing diet will help. We could also consider a statin medication.   If you have any questions or concerns, please call the clinic at the number listed above.       Sincerely,      JUAN ALBERTO Moncada CNP

## 2023-04-11 NOTE — PATIENT INSTRUCTIONS
Great job on healthy lifestyle, Pedro!   Weight is down 5lbs   BP today 127/89 (good!)   Will refill medications and glucose test strips   Labs today: A1c, CBC, CMP, urine albumin    Diabetes Check in   Continue to take medications daily. All medications refilled today.  Will reevaluate therapies at next visit in 1 week  Check blood sugars as able   Makes sure to be eating daily. Try for low sugar, low carb meals. Include fruits, vegetables, and lean meats (like chicken).   Make sure to check feet daily for sores   Wear supportive and protective shoes. Consider podiatry referral     High Blood Pressure   Take medication daily   Reduce salty foods, follow heart healthy and low fat diet   Watch for signs of high blood pressure such as chest pain, ongoing headaches, swelling in legs     Irregular heartbeat   Ziopatch monitor ordered- will call you to schedule     Continue exercise, ideally 5 times a week for 30 min.   Continue to see therapist weekly.     Return to clinic 1 week to review lab results.

## 2023-04-18 ENCOUNTER — OFFICE VISIT (OUTPATIENT)
Dept: FAMILY MEDICINE | Facility: CLINIC | Age: 33
End: 2023-04-18
Payer: MEDICARE

## 2023-04-18 VITALS
TEMPERATURE: 97.6 F | HEIGHT: 66 IN | OXYGEN SATURATION: 97 % | SYSTOLIC BLOOD PRESSURE: 122 MMHG | BODY MASS INDEX: 32.95 KG/M2 | WEIGHT: 205 LBS | DIASTOLIC BLOOD PRESSURE: 81 MMHG | HEART RATE: 101 BPM

## 2023-04-18 DIAGNOSIS — E66.811 CLASS 1 OBESITY WITH SERIOUS COMORBIDITY AND BODY MASS INDEX (BMI) OF 33.0 TO 33.9 IN ADULT, UNSPECIFIED OBESITY TYPE: ICD-10-CM

## 2023-04-18 DIAGNOSIS — E78.5 HYPERLIPIDEMIA LDL GOAL <100: ICD-10-CM

## 2023-04-18 DIAGNOSIS — E11.65 TYPE 2 DIABETES MELLITUS WITH HYPERGLYCEMIA, WITHOUT LONG-TERM CURRENT USE OF INSULIN (H): Primary | ICD-10-CM

## 2023-04-18 DIAGNOSIS — N18.1 CHRONIC KIDNEY DISEASE, STAGE 1: ICD-10-CM

## 2023-04-18 NOTE — PROGRESS NOTES
"       HPI       Pedro Funez is a 32 year old  who presents for   Chief Complaint   Patient presents with     Follow Up       32 year-old male with history Type 2 DM, poorly uncontrolled returns for lab results review. A1c was 11.0% Lipids still elevated but mild improvement from previous. Kidney function: Cr and eGFR. WNL but random albumin elevated.  BS at last encounter was 421.    Has not been checking BS but has picked up all medications. Has been walking daily and interested in changing diet. Seems motivated. Has made eye appointment for May.     Problem, Medication and Allergy Lists were reviewed and updated if needed..    Patient is an established patient of this clinic..         Review of Systems:   Review of Systems         Physical Exam:     Vitals:    04/18/23 1453   BP: 122/81   Pulse: 101   Temp: 97.6  F (36.4  C)   TempSrc: Oral   SpO2: 97%   Weight: 93 kg (205 lb)   Height: 1.674 m (5' 5.9\")     Body mass index is 33.19 kg/m .  Vital signs normal except      Physical Exam  GEN: negative for fever chills. Weight loss as above  CV: negative for CP  RESP: negative.     Results:   Results from last visit:  Office Visit on 04/11/2023   Component Date Value Ref Range Status     Hemoglobin A1C 04/11/2023 11.0 (H)  <5.7 % Final    Normal <5.7%   Prediabetes 5.7-6.4%    Diabetes 6.5% or higher     Note: Adopted from ADA consensus guidelines.     Cholesterol 04/11/2023 250 (H)  <200 mg/dL Final     Triglycerides 04/11/2023 289 (H)  <150 mg/dL Final     Direct Measure HDL 04/11/2023 34 (L)  >=40 mg/dL Final     LDL Cholesterol Calculated 04/11/2023 158 (H)  <=100 mg/dL Final     Non HDL Cholesterol 04/11/2023 216 (H)  <130 mg/dL Final     WBC Count 04/11/2023 10.4  4.0 - 11.0 10e3/uL Final     RBC Count 04/11/2023 5.31  4.40 - 5.90 10e6/uL Final     Hemoglobin 04/11/2023 15.6  13.3 - 17.7 g/dL Final     Hematocrit 04/11/2023 47.6  40.0 - 53.0 % Final     MCV 04/11/2023 90  78 - 100 fL Final     MCH " 04/11/2023 29.4  26.5 - 33.0 pg Final     MCHC 04/11/2023 32.8  31.5 - 36.5 g/dL Final     RDW 04/11/2023 12.6  10.0 - 15.0 % Final     Platelet Count 04/11/2023 324  150 - 450 10e3/uL Final     Sodium 04/11/2023 135 (L)  136 - 145 mmol/L Final     Potassium 04/11/2023 4.9  3.4 - 5.3 mmol/L Final     Chloride 04/11/2023 99  98 - 107 mmol/L Final     Carbon Dioxide (CO2) 04/11/2023 20 (L)  22 - 29 mmol/L Final     Anion Gap 04/11/2023 16 (H)  7 - 15 mmol/L Final     Urea Nitrogen 04/11/2023 19.2  6.0 - 20.0 mg/dL Final     Creatinine 04/11/2023 0.89  0.67 - 1.17 mg/dL Final     Calcium 04/11/2023 9.9  8.6 - 10.0 mg/dL Final     Glucose 04/11/2023 421 (H)  70 - 99 mg/dL Final     Alkaline Phosphatase 04/11/2023 90  40 - 129 U/L Final     AST 04/11/2023 21  10 - 50 U/L Final     ALT 04/11/2023 34  10 - 50 U/L Final     Protein Total 04/11/2023 7.7  6.4 - 8.3 g/dL Final     Albumin 04/11/2023 4.6  3.5 - 5.2 g/dL Final     Bilirubin Total 04/11/2023 0.4  <=1.2 mg/dL Final     GFR Estimate 04/11/2023 >90  >60 mL/min/1.73m2 Final    eGFR calculated using 2021 CKD-EPI equation.     Creatinine Urine mg/dL 04/11/2023 58.3  mg/dL Final    The reference ranges have not been established in urine creatinine. The results should be integrated into the clinical context for interpretation.     Albumin Urine mg/L 04/11/2023 24.6  mg/L Final    The reference ranges have not been established in urine albumin. The results should be integrated into the clinical context for interpretation.     Albumin Urine mg/g Cr 04/11/2023 42.20 (H)  0.00 - 17.00 mg/g Cr Final    Microalbuminuria is defined as an albumin:creatinine ratio of 17 to 299 for males and 25 to 299 for females. A ratio of albumin:creatinine of 300 or higher is indicative of overt proteinuria.  Due to biologic variability, positive results should be confirmed by a second, first-morning random or 24-hour timed urine specimen. If there is discrepancy, a third specimen is  recommended. When 2 out of 3 results are in the microalbuminuria range, this is evidence for incipient nephropathy and warrants increased efforts at glucose control, blood pressure control, and institution of therapy with an angiotensin-converting-enzyme (ACE) inhibitor (if the patient can tolerate it).         Assessment and Plan        1. Type 2 diabetes mellitus with hyperglycemia, without long-term current use of insulin (H)  Reviewed healthy diet for DM and hyperlipidemia. Reinforced healthy living with regular exercise. GOAL A1c is 7.5-8 for now and will work on that going forward. He is motivated right now. WOuld like nutriiton resources and will obtain those for him. He was also referred to nutrition.   - Nutrition Referral    2. Class 1 obesity with serious comorbidity and body mass index (BMI) of 33.0 to 33.9 in adult, unspecified obesity type  As above, work on weight loss.   - Nutrition Referral    3. Hyperlipidemia LDL goal <100  As above. Given information on healthy DM low fat diet.   - Nutrition Referral    4. Chronic kidney disease, stage 1  Monitor. Cr and eGFR WNL but will need to have better BS control to slow progression.     Follow up 1 month, recheck A1c 3 months. \  Will update pharmacist to follow up with him and reinforce medications.          There are no discontinued medications.    Options for treatment and follow-up care were reviewed with the patient. Pedro Funez  engaged in the decision making process and verbalized understanding of the options discussed and agreed with the final plan.    JUAN ALBERTO Moncada CNP

## 2023-04-18 NOTE — NURSING NOTE
"ROOM:3  MYRNA MARTINEZ    Preferred Name: Pedro     How did you hear about us?  Current Patient    32 year old  Chief Complaint   Patient presents with     Follow Up       Blood pressure 122/81, pulse 101, temperature 97.6  F (36.4  C), temperature source Oral, height 1.674 m (5' 5.9\"), weight 93 kg (205 lb), SpO2 97 %. Body mass index is 33.19 kg/m .  BP completed using cuff size:        Patient Active Problem List   Diagnosis     Diabetes mellitus, type 2 (H)     Morbid obesity (H)     MDD (major depressive disorder), recurrent episode, moderate (H)     Intertrigo     GERD (gastroesophageal reflux disease)     REBA (generalized anxiety disorder)     Essential hypertension     Autism spectrum disorder       Wt Readings from Last 2 Encounters:   04/18/23 93 kg (205 lb)   04/11/23 94.6 kg (208 lb 9.6 oz)     BP Readings from Last 3 Encounters:   04/18/23 122/81   04/11/23 127/89   10/04/22 123/86       No Known Allergies    Current Outpatient Medications   Medication     blood glucose (NO BRAND SPECIFIED) lancets standard     blood glucose (NO BRAND SPECIFIED) test strip     blood glucose (NO BRAND SPECIFIED) test strip     blood glucose monitoring (ACCU-CHEK FERMIN PLUS) meter device kit     blood glucose monitoring (ACCU-CHEK MULTICLIX) lancets     Blood Pressure Monitoring (BLOOD PRESSURE CUFF) MISC     buPROPion (WELLBUTRIN XL) 150 MG 24 hr tablet     buPROPion (WELLBUTRIN XL) 300 MG 24 hr tablet     hydrOXYzine (ATARAX) 25 MG tablet     insulin pen needle (32G X 4 MM) 32G X 4 MM miscellaneous     liraglutide (VICTOZA) 18 MG/3ML solution     lisinopril (ZESTRIL) 10 MG tablet     metFORMIN (GLUCOPHAGE XR) 500 MG 24 hr tablet     Skin Protectants, Misc. (EUCERIN) cream     No current facility-administered medications for this visit.       Social History     Tobacco Use     Smoking status: Former     Packs/day: 0.00     Years: 4.00     Pack years: 0.00     Types: Cigarettes     Quit date: 2020     Years since " quitting: 3.2     Smokeless tobacco: Current     Types: Chew   Vaping Use     Vaping status: Never Used   Substance Use Topics     Alcohol use: Yes     Comment: 1 beer periodically. Has binged in the past     Drug use: No       Honoring Choices - Health Care Directive Guide offered to patient at time of visit.    Health Maintenance Due   Topic Date Due     ADVANCE CARE PLANNING  Never done     DEPRESSION ACTION PLAN  Never done     HEPATITIS B IMMUNIZATION (1 of 3 - 3-dose series) Never done     Pneumococcal Vaccine: Pediatrics (0 to 5 Years) and At-Risk Patients (6 to 64 Years) (1 - PCV) Never done     COVID-19 Vaccine (3 - Booster for Miah series) 03/22/2022     MEDICARE ANNUAL WELLNESS VISIT  08/17/2022     EYE EXAM  09/30/2022     PHQ-9  04/04/2023       Immunization History   Administered Date(s) Administered     COVID-19 Vaccine (Miah) 05/08/2021     Influenza Vaccine >6 months (Alfuria,Fluzone) 10/04/2022     TDAP Vaccine (Adacel) 03/22/2019       No results found for: PAP    Recent Labs   Lab Test 04/11/23  1619 10/04/22  1531 06/14/22  1456 03/16/22  1417 12/14/21  1618 08/17/21  1452   A1C 11.0* 11.2* 9.4  --    < > 6.9   * 167*  --   --   --  124*   HDL 34* 35*  --   --   --  42   TRIG 289* 279*  --   --   --  119   ALT 34  --   --   --   --  40   CR 0.89  --   --  0.85  --  0.75   GFRESTIMATED >90  --   --  >90  --  >90   ALBUMIN 4.6  --   --   --   --  4.0   POTASSIUM 4.9  --   --  5.0  --  4.1   TSH  --   --   --   --   --  1.39    < > = values in this interval not displayed.           4/11/2023     2:50 PM 10/4/2022     2:12 PM   PHQ-2 ( 1999 Pfizer)   Q1: Little interest or pleasure in doing things 0 0   Q2: Feeling down, depressed or hopeless 0 0   PHQ-2 Score 0 0           9/7/2021    11:28 AM 6/14/2022     2:09 PM 10/4/2022     2:36 PM 10/4/2022     2:42 PM   PHQ-9 SCORE   PHQ-9 Total Score 2 6 4 4           8/10/2021     4:24 PM 9/7/2021    11:28 AM 10/4/2022     2:42 PM   REBA-7  SCORE   Total Score 16 2 3            View : No data to display.                Jordon Brumfield    April 18, 2023 2:56 PM

## 2023-04-18 NOTE — PATIENT INSTRUCTIONS
Type 2 DM A1c 11.0  Goal: 7.5-8 starting  Check blood sugar every morning for a few days and then a couple of evenings  Keep working on health diet and weight loss: reduce fats, sweets, breads cereals. Choose lean meats, vegetables  Walk daily  Referred to nutrition  Follow up 1 month  Recheck A1c in 3 months  [facility],the pharmacist will call you to follow up

## 2023-05-16 ENCOUNTER — OFFICE VISIT (OUTPATIENT)
Dept: FAMILY MEDICINE | Facility: CLINIC | Age: 33
End: 2023-05-16
Payer: MEDICARE

## 2023-05-16 VITALS
BODY MASS INDEX: 35.16 KG/M2 | WEIGHT: 211 LBS | TEMPERATURE: 98.1 F | SYSTOLIC BLOOD PRESSURE: 121 MMHG | HEART RATE: 98 BPM | HEIGHT: 65 IN | DIASTOLIC BLOOD PRESSURE: 78 MMHG | OXYGEN SATURATION: 95 %

## 2023-05-16 DIAGNOSIS — E11.65 TYPE 2 DIABETES MELLITUS WITH HYPERGLYCEMIA, WITHOUT LONG-TERM CURRENT USE OF INSULIN (H): Primary | ICD-10-CM

## 2023-05-16 NOTE — PROGRESS NOTES
"       HPI       Pedro Funez is a 32 year old  who presents for   Chief Complaint   Patient presents with     RECHECK     32 year-old male presents to clinic for DM follow up. All meds were restarted in April. He is adherent to medication regimen by report. Tolerating liraglutide well. Noted a little GI upset and looser stools but resolving. Trying to eat more vegetables. Has friend who cooks for him. Is interested in learning more about cooking  Developed sore on right ankle a few weeks ago. Does not know what he did but it has resolved. Denies numbness or tingling in toes. No peripheral edema. Has been walking more.   Checking BS about one time per week. Running between 150-180. Interested in using his grandfather's metformin that is left over. Says it is the same strength and would save him money.     Problem, Medication and Allergy Lists were reviewed and updated if needed..    Patient is an established patient of this clinic..         Review of Systems:   Review of Systems  GEN: denies weight change. Working on diet and exercise.   CV: negative for CP, irregular heart beat  RESP: negative for cough SOB. Tolerating activity  GI: as per HPI. Negative abdominal pain  : negative for increased thirst, urination  DERM: leg wound as per HPI.  Denies current rash or reaction to liraglutide injections  NEURO: negative for numbness or tingling.        Physical Exam:     Vitals:    05/16/23 1359   BP: 121/78   Pulse: 98   Temp: 98.1  F (36.7  C)   TempSrc: Oral   SpO2: 95%   Weight: 95.7 kg (211 lb)   Height: 1.656 m (5' 5.2\")     Body mass index is 34.9 kg/m .  Vitals were reviewed and were normal     Physical Exam  GEN: alert male in NAD  Right lower extremity: scabbed areas medial surface just above right ankle. No erythema or warmth. No edema.   Rest of PE deferred.     Results:   No testing ordered today    Assessment and Plan        1. Type 2 diabetes mellitus with hyperglycemia, without long-term current " use of insulin (H)  Reviewed healthy diet, reinforced exercise. Work on weight loss. Given information about obtaining leftovers from [employer]. Interested in taking Healthy  Cooking classes through [facility]. Will look into it for him.  Reviewed lab results form preivous, BS goals. Request follow up early July for repeat A1c. Request 2-3 BS every week. Bring log with him to next visit.   Not recommended to use someone else's medication but appreciate cost implications.  He plans to bring in medication to have someone else look at it.          There are no discontinued medications.    Options for treatment and follow-up care were reviewed with the patient. Pedro Funez  engaged in the decision making process and verbalized understanding of the options discussed and agreed with the final plan.    JUAN ALBERTO Moncada CNP

## 2023-05-16 NOTE — PATIENT INSTRUCTIONS
Diabetes  Blood sugar goal 130-150  Continue with dietary changes: more vegetables, fewer carbohydrates. Eat low fat foods. Avoid sweets, sweetened beveragees  Follow up early July for A1c  Continue all medications. Will let [facility] pharmacist know you were in today   Check out dose of metformin before using    Given information on [employer]  Will check into [facility] Healthy Eating classes

## 2023-05-16 NOTE — NURSING NOTE
"ROOM:2  MYRNA MARTINEZ    Preferred Name: Pedro     How did you hear about us?  Current Patient    32 year old  Chief Complaint   Patient presents with     RECHECK       Blood pressure 121/78, pulse 98, temperature 98.1  F (36.7  C), temperature source Oral, height 1.656 m (5' 5.2\"), weight 95.7 kg (211 lb), SpO2 95 %. Body mass index is 34.9 kg/m .  BP completed using cuff size:        Patient Active Problem List   Diagnosis     Diabetes mellitus, type 2 (H)     Morbid obesity (H)     MDD (major depressive disorder), recurrent episode, moderate (H)     Intertrigo     GERD (gastroesophageal reflux disease)     REBA (generalized anxiety disorder)     Essential hypertension     Autism spectrum disorder     Chronic kidney disease, stage 1       Wt Readings from Last 2 Encounters:   05/16/23 95.7 kg (211 lb)   04/18/23 93 kg (205 lb)     BP Readings from Last 3 Encounters:   05/16/23 121/78   04/18/23 122/81   04/11/23 127/89       No Known Allergies    Current Outpatient Medications   Medication     blood glucose (NO BRAND SPECIFIED) lancets standard     blood glucose (NO BRAND SPECIFIED) test strip     blood glucose (NO BRAND SPECIFIED) test strip     blood glucose monitoring (ACCU-CHEK FERMIN PLUS) meter device kit     blood glucose monitoring (ACCU-CHEK MULTICLIX) lancets     Blood Pressure Monitoring (BLOOD PRESSURE CUFF) MISC     buPROPion (WELLBUTRIN XL) 150 MG 24 hr tablet     buPROPion (WELLBUTRIN XL) 300 MG 24 hr tablet     hydrOXYzine (ATARAX) 25 MG tablet     insulin pen needle (32G X 4 MM) 32G X 4 MM miscellaneous     liraglutide (VICTOZA) 18 MG/3ML solution     lisinopril (ZESTRIL) 10 MG tablet     metFORMIN (GLUCOPHAGE XR) 500 MG 24 hr tablet     Skin Protectants, Misc. (EUCERIN) cream     No current facility-administered medications for this visit.       Social History     Tobacco Use     Smoking status: Former     Packs/day: 0.00     Years: 4.00     Pack years: 0.00     Types: Cigarettes     Quit " date: 2020     Years since quitting: 3.3     Smokeless tobacco: Current     Types: Chew   Vaping Use     Vaping status: Never Used   Substance Use Topics     Alcohol use: Yes     Comment: 1 beer periodically. Has binged in the past     Drug use: No       Honoring Choices - Health Care Directive Guide offered to patient at time of visit.    Health Maintenance Due   Topic Date Due     ADVANCE CARE PLANNING  Never done     DEPRESSION ACTION PLAN  Never done     HEPATITIS B IMMUNIZATION (1 of 3 - 3-dose series) Never done     Pneumococcal Vaccine: Pediatrics (0 to 5 Years) and At-Risk Patients (6 to 64 Years) (1 - PCV) Never done     COVID-19 Vaccine (3 - Booster for Miah series) 03/22/2022     MEDICARE ANNUAL WELLNESS VISIT  08/17/2022     EYE EXAM  09/30/2022     PHQ-9  04/04/2023       Immunization History   Administered Date(s) Administered     COVID-19 Vaccine (Miah) 05/08/2021     Influenza Vaccine >6 months (Alfuria,Fluzone) 10/04/2022     TDAP Vaccine (Adacel) 03/22/2019       No results found for: PAP    Recent Labs   Lab Test 04/11/23  1619 10/04/22  1531 06/14/22  1456 03/16/22  1417 12/14/21  1618 08/17/21  1452   A1C 11.0* 11.2* 9.4  --    < > 6.9   * 167*  --   --   --  124*   HDL 34* 35*  --   --   --  42   TRIG 289* 279*  --   --   --  119   ALT 34  --   --   --   --  40   CR 0.89  --   --  0.85  --  0.75   GFRESTIMATED >90  --   --  >90  --  >90   ALBUMIN 4.6  --   --   --   --  4.0   POTASSIUM 4.9  --   --  5.0  --  4.1   TSH  --   --   --   --   --  1.39    < > = values in this interval not displayed.           4/11/2023     2:50 PM 10/4/2022     2:12 PM   PHQ-2 ( 1999 Pfizer)   Q1: Little interest or pleasure in doing things 0 0   Q2: Feeling down, depressed or hopeless 0 0   PHQ-2 Score 0 0           9/7/2021    11:28 AM 6/14/2022     2:09 PM 10/4/2022     2:36 PM 10/4/2022     2:42 PM   PHQ-9 SCORE   PHQ-9 Total Score 2 6 4 4           8/10/2021     4:24 PM 9/7/2021    11:28 AM  10/4/2022     2:42 PM   REBA-7 SCORE   Total Score 16 2 3            View : No data to display.                Jordon Brumfield    May 16, 2023 1:59 PM

## 2023-05-26 ENCOUNTER — OFFICE VISIT (OUTPATIENT)
Dept: OPHTHALMOLOGY | Facility: CLINIC | Age: 33
End: 2023-05-26
Payer: COMMERCIAL

## 2023-05-26 DIAGNOSIS — H52.223 REGULAR ASTIGMATISM OF BOTH EYES: ICD-10-CM

## 2023-05-26 DIAGNOSIS — E11.9 TYPE 2 DIABETES MELLITUS WITHOUT COMPLICATION, WITHOUT LONG-TERM CURRENT USE OF INSULIN (H): Primary | ICD-10-CM

## 2023-05-26 PROCEDURE — 92014 COMPRE OPH EXAM EST PT 1/>: CPT | Performed by: OPTOMETRIST

## 2023-05-26 PROCEDURE — 92015 DETERMINE REFRACTIVE STATE: CPT | Performed by: OPTOMETRIST

## 2023-05-26 ASSESSMENT — REFRACTION_MANIFEST
OD_SPHERE: -0.25
OD_AXIS: 015
OD_CYLINDER: +0.75
OS_AXIS: 160
OS_SPHERE: -1.75
OS_CYLINDER: +1.00

## 2023-05-26 ASSESSMENT — CUP TO DISC RATIO
OS_RATIO: 0.25
OD_RATIO: 0.25

## 2023-05-26 ASSESSMENT — CONF VISUAL FIELD
OS_SUPERIOR_TEMPORAL_RESTRICTION: 0
OS_INFERIOR_NASAL_RESTRICTION: 0
OS_NORMAL: 1
METHOD: COUNTING FINGERS
OS_INFERIOR_TEMPORAL_RESTRICTION: 0
OD_INFERIOR_NASAL_RESTRICTION: 0
OD_NORMAL: 1
OD_SUPERIOR_NASAL_RESTRICTION: 0
OD_INFERIOR_TEMPORAL_RESTRICTION: 0
OS_SUPERIOR_NASAL_RESTRICTION: 0
OD_SUPERIOR_TEMPORAL_RESTRICTION: 0

## 2023-05-26 ASSESSMENT — TONOMETRY
OS_IOP_MMHG: 20
OD_IOP_MMHG: 20
IOP_METHOD: ICARE

## 2023-05-26 ASSESSMENT — VISUAL ACUITY
OD_SC: 20/20
METHOD: SNELLEN - LINEAR
OD_SC+: -2
OS_SC: 20/30

## 2023-05-26 ASSESSMENT — EXTERNAL EXAM - LEFT EYE: OS_EXAM: NORMAL

## 2023-05-26 ASSESSMENT — SLIT LAMP EXAM - LIDS
COMMENTS: NORMAL
COMMENTS: NORMAL

## 2023-05-26 ASSESSMENT — EXTERNAL EXAM - RIGHT EYE: OD_EXAM: NORMAL

## 2023-05-26 NOTE — PROGRESS NOTES
A/P  1.) Type 2 DM without ophthalmic manifestation each eye  -Last A1c 11.0, no known history of diabetic retinopathy  -Reviewed effects of DM on the eyes and importance of good blood sugar control  -Monitor annually with dilation    2.) Astigmatism left eye>right eye  -Glasses wear prn. He did not fill last exam's Rx but would like to get them now    Monitor 1 year diabetic eye exam    I have confirmed the patient's CC, HPI and reviewed Past Medical History, Past Surgical History, Social History, Family History, Problem List, Medication List and agree with Tech note.     Merari Faustin, OD FAAO FSLS

## 2023-05-26 NOTE — NURSING NOTE
Chief Complaints and History of Present Illnesses   Patient presents with     Diabetic Eye Exam     Chief Complaint(s) and History of Present Illness(es)     Diabetic Eye Exam            Associated symptoms: Negative for redness, flashes and floaters          Comments    Patient present for routine exam. Patient states migraines. Patient states when he wore glasses in the past migraines decreased.  Lab Results       Component                Value               Date                       A1C                      11.0                04/11/2023                 A1C                      11.2                10/04/2022                 A1C                      9.4                 06/14/2022                 A1C                      10.7                03/15/2022                 A1C                      6.9                 12/14/2021             ADALID Holland May 26, 2023 1:10 PM

## 2023-07-11 ENCOUNTER — OFFICE VISIT (OUTPATIENT)
Dept: FAMILY MEDICINE | Facility: CLINIC | Age: 33
End: 2023-07-11
Payer: MEDICARE

## 2023-07-11 VITALS
WEIGHT: 204 LBS | TEMPERATURE: 98.7 F | SYSTOLIC BLOOD PRESSURE: 109 MMHG | DIASTOLIC BLOOD PRESSURE: 80 MMHG | HEIGHT: 65 IN | HEART RATE: 105 BPM | BODY MASS INDEX: 33.99 KG/M2 | OXYGEN SATURATION: 94 %

## 2023-07-11 DIAGNOSIS — F33.1 MDD (MAJOR DEPRESSIVE DISORDER), RECURRENT EPISODE, MODERATE (H): ICD-10-CM

## 2023-07-11 DIAGNOSIS — I10 ESSENTIAL HYPERTENSION: ICD-10-CM

## 2023-07-11 DIAGNOSIS — E11.65 TYPE 2 DIABETES MELLITUS WITH HYPERGLYCEMIA, WITHOUT LONG-TERM CURRENT USE OF INSULIN (H): ICD-10-CM

## 2023-07-11 DIAGNOSIS — I10 BENIGN ESSENTIAL HYPERTENSION: ICD-10-CM

## 2023-07-11 DIAGNOSIS — R06.83 SNORING: Primary | ICD-10-CM

## 2023-07-11 LAB — HBA1C MFR BLD: 7.2 % (ref 0–5.6)

## 2023-07-11 RX ORDER — ADHESIVE BANDAGE 3/4"
1 BANDAGE TOPICAL DAILY
Qty: 1 EACH | Refills: 0 | Status: SHIPPED | OUTPATIENT
Start: 2023-07-11

## 2023-07-11 RX ORDER — BUPROPION HYDROCHLORIDE 150 MG/1
150 TABLET ORAL EVERY MORNING
Qty: 30 TABLET | Refills: 1 | Status: SHIPPED | OUTPATIENT
Start: 2023-07-11 | End: 2023-10-10

## 2023-07-11 RX ORDER — METFORMIN HCL 500 MG
2000 TABLET, EXTENDED RELEASE 24 HR ORAL
Qty: 120 TABLET | Refills: 1 | Status: SHIPPED | OUTPATIENT
Start: 2023-07-11 | End: 2023-10-10

## 2023-07-11 RX ORDER — LISINOPRIL 10 MG/1
10 TABLET ORAL DAILY
Qty: 30 TABLET | Refills: 2 | Status: SHIPPED | OUTPATIENT
Start: 2023-07-11 | End: 2023-10-10

## 2023-07-11 NOTE — NURSING NOTE
"ROOM:1  MYRNA MARTINEZ    Preferred Name: Pedro     How did you hear about us?  Current Patient    32 year old  Chief Complaint   Patient presents with     Follow Up       Blood pressure 109/80, pulse 105, temperature 98.7  F (37.1  C), temperature source Oral, height 1.661 m (5' 5.4\"), weight 92.5 kg (204 lb), SpO2 94 %. Body mass index is 33.53 kg/m .  BP completed using cuff size:        Patient Active Problem List   Diagnosis     Diabetes mellitus, type 2 (H)     Morbid obesity (H)     MDD (major depressive disorder), recurrent episode, moderate (H)     Intertrigo     GERD (gastroesophageal reflux disease)     REBA (generalized anxiety disorder)     Essential hypertension     Autism spectrum disorder     Chronic kidney disease, stage 1       Wt Readings from Last 2 Encounters:   07/11/23 92.5 kg (204 lb)   05/16/23 95.7 kg (211 lb)     BP Readings from Last 3 Encounters:   07/11/23 109/80   05/16/23 121/78   04/18/23 122/81       No Known Allergies    Current Outpatient Medications   Medication     blood glucose (NO BRAND SPECIFIED) lancets standard     blood glucose (NO BRAND SPECIFIED) test strip     blood glucose (NO BRAND SPECIFIED) test strip     blood glucose monitoring (ACCU-CHEK FERMIN PLUS) meter device kit     blood glucose monitoring (ACCU-CHEK MULTICLIX) lancets     Blood Pressure Monitoring (BLOOD PRESSURE CUFF) MISC     buPROPion (WELLBUTRIN XL) 150 MG 24 hr tablet     buPROPion (WELLBUTRIN XL) 300 MG 24 hr tablet     hydrOXYzine (ATARAX) 25 MG tablet     insulin pen needle (32G X 4 MM) 32G X 4 MM miscellaneous     liraglutide (VICTOZA) 18 MG/3ML solution     lisinopril (ZESTRIL) 10 MG tablet     metFORMIN (GLUCOPHAGE XR) 500 MG 24 hr tablet     Skin Protectants, Misc. (EUCERIN) cream     No current facility-administered medications for this visit.       Social History     Tobacco Use     Smoking status: Former     Packs/day: 0.00     Years: 4.00     Pack years: 0.00     Types: Cigarettes     " Quit date: 2020     Years since quitting: 3.5     Smokeless tobacco: Current     Types: Chew   Vaping Use     Vaping Use: Never used   Substance Use Topics     Alcohol use: Yes     Comment: 1 beer periodically. Has binged in the past     Drug use: No       Honoring Choices - Health Care Directive Guide offered to patient at time of visit.    Health Maintenance Due   Topic Date Due     ADVANCE CARE PLANNING  Never done     DEPRESSION ACTION PLAN  Never done     HEPATITIS B IMMUNIZATION (1 of 3 - 3-dose series) Never done     Pneumococcal Vaccine: Pediatrics (0 to 5 Years) and At-Risk Patients (6 to 64 Years) (1 - PCV) Never done     COVID-19 Vaccine (3 - Booster for Miha series) 03/22/2022     MEDICARE ANNUAL WELLNESS VISIT  08/17/2022     PHQ-9  04/04/2023     A1C  07/11/2023       Immunization History   Administered Date(s) Administered     COVID-19 Vaccine (Miah) 05/08/2021     Influenza Vaccine >6 months (Alfuria,Fluzone) 10/04/2022     TDAP Vaccine (Adacel) 03/22/2019       No results found for: PAP    Recent Labs   Lab Test 04/11/23  1619 10/04/22  1531 06/14/22  1456 03/16/22  1417 12/14/21  1618 08/17/21  1452   A1C 11.0* 11.2* 9.4  --    < > 6.9   * 167*  --   --   --  124*   HDL 34* 35*  --   --   --  42   TRIG 289* 279*  --   --   --  119   ALT 34  --   --   --   --  40   CR 0.89  --   --  0.85  --  0.75   GFRESTIMATED >90  --   --  >90  --  >90   ALBUMIN 4.6  --   --   --   --  4.0   POTASSIUM 4.9  --   --  5.0  --  4.1   TSH  --   --   --   --   --  1.39    < > = values in this interval not displayed.           5/26/2023     1:11 PM 4/11/2023     2:50 PM   PHQ-2 ( 1999 Pfizer)   Q1: Little interest or pleasure in doing things 0 0   Q2: Feeling down, depressed or hopeless 0 0   PHQ-2 Score 0 0           9/7/2021    11:28 AM 6/14/2022     2:09 PM 10/4/2022     2:36 PM 10/4/2022     2:42 PM   PHQ-9 SCORE   PHQ-9 Total Score 2 6 4 4           8/10/2021     4:24 PM 9/7/2021    11:28 AM 10/4/2022      2:42 PM   REBA-7 SCORE   Total Score 16 2 3            No data to display                Jordon Octaviano    July 11, 2023 2:02 PM

## 2023-07-11 NOTE — PROGRESS NOTES
"       HPI       Pedro Funez is a 32 year old  who presents for   Chief Complaint   Patient presents with     Follow Up     32 year-old male with history of Type 2 DM returns for DM follow up. He was well controlled in 2021 but has intermittently stopped takin medications in 2022 which resulted in A1c as high as 11.2 in 10/4/22. He has fluctuated between 9 and 11 since then. All medications were resumed including metformin and liraglutide.  Has been taking medications regularly but not taking liraglutide. States he forgets to take liraglutide. Taking Metformin. Has really worked on diet and exercise and has been walking up to 5-7 miles per day. Has been losing weight. Eating more vegetables.     Eye exam 5/25/23  Walking more: 5-6 miles. Eating healthier. Going to farmer's market    Needs most medications refilled today    Problem, Medication and Allergy Lists were reviewed and updated if needed..    Patient is an established patient of this clinic..         Review of Systems:   Review of Systems  GEN: feeling good  EYES: no concerns  CV: negative for CP and irregular heart beat  RESP: negative for SOB  GI: some stomach upset: feels gassy. Thinks it is the vegetables. Stool pattern pretty regular; had 1 day where it appeared dark but no visible blood or mucus. Occasional reflux  : negative for dysuria, urgency or frequency  SKIN: no rash  MOOD: good  ENDO: as per HPI. No increase in thirst or urination  NEURO: negative for numbness or tingling       Physical Exam:     Vitals:    07/11/23 1400   BP: 109/80   Pulse: 105   Temp: 98.7  F (37.1  C)   TempSrc: Oral   SpO2: 94%   Weight: 92.5 kg (204 lb)   Height: 1.661 m (5' 5.4\")     Body mass index is 33.53 kg/m .  Vitals were reviewed and were normal. Weight decreased from 213 pounds 10/4/22     Physical Exam  GEN: alert male NAD; talkative. Clothes are clean  EYEs clear  CV: HRRR, s1, S2, no MRG. Pedal pulses +2 bilaterally.   RESP: Lungs CTA  ABD: soft with " active BSx4. No HSM, nontender  Skin without rash. Feet are pale pink warm. Left great toenail encroaching on surrounding skin, mild erythema, no swelling or tenderness.   NEURO: Feet: Sensation intact to monofilament      Results:      Results from this visit  Results for orders placed or performed in visit on 07/11/23   Hemoglobin A1c     Status: Abnormal   Result Value Ref Range    Hemoglobin A1C 7.2 (H) 0.0 - 5.6 %       Assessment and Plan        1. Essential hypertension, currently well controlled  Requesting BP cuff for home monitoring.  Reinforced healthy lifestyle  - Blood Pressure Monitoring (BLOOD PRESSURE CUFF) MISC; 1 Units daily  Dispense: 1 each; Refill: 0    2. MDD (major depressive disorder), recurrent episode, moderate (H)  Refilled. Is seeing counselor.   - buPROPion (WELLBUTRIN XL) 150 MG 24 hr tablet; Take 1 tablet (150 mg) by mouth every morning  Dispense: 30 tablet; Refill: 1    3. Snoring  History of snoring, obesity. Will refer for sleep study  - Adult Sleep Eval & Management Referral; Future    4. Type 2 diabetes mellitus with hyperglycemia, without long-term current use of insulin (H)  A1c down to 7.2. Reinforced positive lifestyle changes. Still recommend beginning liraglutide. Reviewed use and side effects  - Hemoglobin A1c; Future  - metFORMIN (GLUCOPHAGE XR) 500 MG 24 hr tablet; Take 4 tablets (2,000 mg) by mouth daily (with dinner)  Dispense: 120 tablet; Refill: 1  - Hemoglobin A1c  Next check up 3 months, sooner if problems    5. Benign essential hypertension  Refilled.Monitor BP at home. Goal 130/80  - lisinopril (ZESTRIL) 10 MG tablet; Take 1 tablet (10 mg) by mouth daily  Dispense: 30 tablet; Refill: 2    Recommend food diary to record when he feels gassy and what he ate. Discussed that some vegetables produce more gas than others. He could identify which foods are giving him more distress and minimize intake of those. Return if worsening problems. Has some reflux but manageable  by report.      There are no discontinued medications.    Options for treatment and follow-up care were reviewed with the patient. Pedro Funez  engaged in the decision making process and verbalized understanding of the options discussed and agreed with the final plan.    JUAN ALBERTO Moncada CNP

## 2023-07-11 NOTE — PATIENT INSTRUCTIONS
Diabetes  BS goal 130-150  Refilled metformin  Start liraglutide; set alarm on phone to help you remember  Schedule dental visit  A1c today  Great work with diet and exercise and weight loss    GI upset  Keep diary of foods to identify if there are certain foods that are causing more gas - minimize those if necessary  Liraglutide might upset stomach a little but usually resolves within 2 weeks    Snoring  Referred to sleep clinic

## 2023-10-10 ENCOUNTER — OFFICE VISIT (OUTPATIENT)
Dept: FAMILY MEDICINE | Facility: CLINIC | Age: 33
End: 2023-10-10
Payer: MEDICARE

## 2023-10-10 VITALS
WEIGHT: 206.2 LBS | HEIGHT: 65 IN | TEMPERATURE: 98 F | BODY MASS INDEX: 34.35 KG/M2 | SYSTOLIC BLOOD PRESSURE: 137 MMHG | OXYGEN SATURATION: 94 % | HEART RATE: 97 BPM | DIASTOLIC BLOOD PRESSURE: 85 MMHG

## 2023-10-10 DIAGNOSIS — H60.502 ACUTE OTITIS EXTERNA OF LEFT EAR, UNSPECIFIED TYPE: ICD-10-CM

## 2023-10-10 DIAGNOSIS — Z23 FLU VACCINE NEED: ICD-10-CM

## 2023-10-10 DIAGNOSIS — E66.811 CLASS 1 OBESITY WITH SERIOUS COMORBIDITY AND BODY MASS INDEX (BMI) OF 34.0 TO 34.9 IN ADULT, UNSPECIFIED OBESITY TYPE: ICD-10-CM

## 2023-10-10 DIAGNOSIS — E11.65 TYPE 2 DIABETES MELLITUS WITH HYPERGLYCEMIA, WITHOUT LONG-TERM CURRENT USE OF INSULIN (H): Primary | ICD-10-CM

## 2023-10-10 DIAGNOSIS — E11.9 TYPE 2 DIABETES MELLITUS WITHOUT COMPLICATION, WITHOUT LONG-TERM CURRENT USE OF INSULIN (H): ICD-10-CM

## 2023-10-10 DIAGNOSIS — E78.5 HYPERLIPIDEMIA LDL GOAL <100: ICD-10-CM

## 2023-10-10 DIAGNOSIS — L98.9 SKIN LESION: ICD-10-CM

## 2023-10-10 DIAGNOSIS — F33.1 MDD (MAJOR DEPRESSIVE DISORDER), RECURRENT EPISODE, MODERATE (H): ICD-10-CM

## 2023-10-10 DIAGNOSIS — I10 BENIGN ESSENTIAL HYPERTENSION: ICD-10-CM

## 2023-10-10 LAB — HBA1C MFR BLD: 6.8 %

## 2023-10-10 PROCEDURE — 83036 HEMOGLOBIN GLYCOSYLATED A1C: CPT | Mod: ORL | Performed by: NURSE PRACTITIONER

## 2023-10-10 PROCEDURE — 80061 LIPID PANEL: CPT | Mod: ORL | Performed by: NURSE PRACTITIONER

## 2023-10-10 RX ORDER — LIRAGLUTIDE 6 MG/ML
1.2 INJECTION SUBCUTANEOUS DAILY
Qty: 3 ML | Refills: 2 | Status: SHIPPED | OUTPATIENT
Start: 2023-10-10 | End: 2024-04-15

## 2023-10-10 RX ORDER — METFORMIN HCL 500 MG
2000 TABLET, EXTENDED RELEASE 24 HR ORAL
Qty: 120 TABLET | Refills: 1 | Status: SHIPPED | OUTPATIENT
Start: 2023-10-10 | End: 2024-01-11

## 2023-10-10 RX ORDER — CIPROFLOXACIN AND DEXAMETHASONE 3; 1 MG/ML; MG/ML
4 SUSPENSION/ DROPS AURICULAR (OTIC) 2 TIMES DAILY
Qty: 7.5 ML | Refills: 0 | Status: SHIPPED | OUTPATIENT
Start: 2023-10-10 | End: 2024-01-16

## 2023-10-10 RX ORDER — LISINOPRIL 10 MG/1
10 TABLET ORAL DAILY
Qty: 90 TABLET | Refills: 1 | Status: SHIPPED | OUTPATIENT
Start: 2023-10-10 | End: 2024-04-16

## 2023-10-10 RX ORDER — LISINOPRIL 10 MG/1
10 TABLET ORAL DAILY
Qty: 30 TABLET | Refills: 2 | Status: SHIPPED | OUTPATIENT
Start: 2023-10-10 | End: 2023-10-10

## 2023-10-10 RX ORDER — BUPROPION HYDROCHLORIDE 150 MG/1
150 TABLET ORAL EVERY MORNING
Qty: 30 TABLET | Refills: 1 | Status: SHIPPED | OUTPATIENT
Start: 2023-10-10 | End: 2024-01-16

## 2023-10-10 NOTE — NURSING NOTE
"ROOM:2  MYRNA MARTINEZ    Preferred Name: Pedro     How did you hear about us?  Current Patient    33 year old  Chief Complaint   Patient presents with     RECHECK       Blood pressure 137/85, pulse 97, temperature 98  F (36.7  C), temperature source Oral, height 1.643 m (5' 4.7\"), weight 93.5 kg (206 lb 3.2 oz), SpO2 94 %. Body mass index is 34.63 kg/m .  BP completed using cuff size:        Patient Active Problem List   Diagnosis     Diabetes mellitus, type 2 (H)     Morbid obesity (H)     MDD (major depressive disorder), recurrent episode, moderate (H)     Intertrigo     GERD (gastroesophageal reflux disease)     REBA (generalized anxiety disorder)     Essential hypertension     Autism spectrum disorder     Chronic kidney disease, stage 1       Wt Readings from Last 2 Encounters:   10/10/23 93.5 kg (206 lb 3.2 oz)   07/11/23 92.5 kg (204 lb)     BP Readings from Last 3 Encounters:   10/10/23 137/85   07/11/23 109/80   05/16/23 121/78       No Known Allergies    Current Outpatient Medications   Medication     blood glucose (NO BRAND SPECIFIED) lancets standard     blood glucose (NO BRAND SPECIFIED) test strip     blood glucose (NO BRAND SPECIFIED) test strip     blood glucose monitoring (ACCU-CHEK FERMIN PLUS) meter device kit     blood glucose monitoring (ACCU-CHEK MULTICLIX) lancets     Blood Pressure Monitoring (BLOOD PRESSURE CUFF) MISC     buPROPion (WELLBUTRIN XL) 150 MG 24 hr tablet     hydrOXYzine (ATARAX) 25 MG tablet     insulin pen needle (32G X 4 MM) 32G X 4 MM miscellaneous     liraglutide (VICTOZA) 18 MG/3ML solution     lisinopril (ZESTRIL) 10 MG tablet     metFORMIN (GLUCOPHAGE XR) 500 MG 24 hr tablet     Skin Protectants, Misc. (EUCERIN) cream     No current facility-administered medications for this visit.       Social History     Tobacco Use     Smoking status: Former     Packs/day: 0.00     Years: 4.00     Pack years: 0.00     Types: Cigarettes     Quit date: 2020     Years since " quitting: 3.7     Smokeless tobacco: Current     Types: Chew   Vaping Use     Vaping Use: Never used   Substance Use Topics     Alcohol use: Yes     Comment: 1 beer periodically. Has binged in the past     Drug use: No       Honoring Choices - Health Care Directive Guide offered to patient at time of visit.    Health Maintenance Due   Topic Date Due     ADVANCE CARE PLANNING  Never done     DEPRESSION ACTION PLAN  Never done     HEPATITIS B IMMUNIZATION (1 of 3 - 3-dose series) Never done     Pneumococcal Vaccine: Pediatrics (0 to 5 Years) and At-Risk Patients (6 to 64 Years) (1 - PCV) Never done     HEPATITIS A IMMUNIZATION (1 of 2 - Risk 2-dose series) Never done     MEDICARE ANNUAL WELLNESS VISIT  08/17/2022     PHQ-9  04/04/2023     INFLUENZA VACCINE (1) 09/01/2023     COVID-19 Vaccine (3 - 2023-24 season) 09/01/2023     A1C  10/11/2023       Immunization History   Administered Date(s) Administered     COVID-19 Vaccine (Miah) 05/08/2021     Influenza Vaccine >6 months (Alfuria,Fluzone) 10/04/2022     TDAP Vaccine (Adacel) 03/22/2019       No results found for: PAP    Recent Labs   Lab Test 07/11/23  1517 04/11/23  1619 10/04/22  1531 06/14/22  1456 03/16/22  1417 12/14/21  1618 08/17/21  1452   A1C 7.2* 11.0* 11.2*   < >  --    < > 6.9   LDL  --  158* 167*  --   --   --  124*   HDL  --  34* 35*  --   --   --  42   TRIG  --  289* 279*  --   --   --  119   ALT  --  34  --   --   --   --  40   CR  --  0.89  --   --  0.85  --  0.75   GFRESTIMATED  --  >90  --   --  >90  --  >90   ALBUMIN  --  4.6  --   --   --   --  4.0   POTASSIUM  --  4.9  --   --  5.0  --  4.1   TSH  --   --   --   --   --   --  1.39    < > = values in this interval not displayed.           5/26/2023     1:11 PM 4/11/2023     2:50 PM   PHQ-2 ( 1999 Pfizer)   Q1: Little interest or pleasure in doing things 0 0   Q2: Feeling down, depressed or hopeless 0 0   PHQ-2 Score 0 0           9/7/2021    11:28 AM 6/14/2022     2:09 PM 10/4/2022     2:36  PM 10/4/2022     2:42 PM   PHQ-9 SCORE   PHQ-9 Total Score 2 6 4 4           8/10/2021     4:24 PM 9/7/2021    11:28 AM 10/4/2022     2:42 PM   REBA-7 SCORE   Total Score 16 2 3            No data to display                Sunshine Lerma    October 10, 2023 2:19 PM

## 2023-10-10 NOTE — PATIENT INSTRUCTIONS
DM 2  Resume liraglutide and take metformin daily  Last eye exam 5/23  Need to schedule dental  Continue with efforts to lose weight, eat healthy  A1c today  Return in 3 months    Otitis externa  Ordered cipro HCL ear drops: 3-4 drops left ear twice daily for 5-7 days  Warm pack for comfort  Return if not improving or worsening    Skin lesions  Referred to dermatology    Hyperlipidemia  Continue healthy diet  Check lipids today

## 2023-10-10 NOTE — LETTER
October 11, 2023      Pedroanil Funez  [address]     [place] [address]        Dear Mr.Siegl Funez,    We are writing to inform you of your test results.    Pedro, here are your lab results: your cholesterol levels remain high although they are better than previous, so your diet is making a difference! You might benefit from statin and we can discuss.  I will also talk with Nuria our pharmacist on her recommendation. Your 3-month blood sugar average is good at 6.8. Good work!  .  Resulted Orders   Lipid panel reflex to direct LDL Fasting   Result Value Ref Range    Cholesterol 221 (H) <200 mg/dL    Triglycerides 213 (H) <150 mg/dL    Direct Measure HDL 45 >=40 mg/dL    LDL Cholesterol Calculated 133 (H) <=100 mg/dL    Non HDL Cholesterol 176 (H) <130 mg/dL    Narrative    Cholesterol  Desirable:  <200 mg/dL    Triglycerides  Normal:  Less than 150 mg/dL  Borderline High:  150-199 mg/dL  High:  200-499 mg/dL  Very High:  Greater than or equal to 500 mg/dL    Direct Measure HDL  Female:  Greater than or equal to 50 mg/dL   Male:  Greater than or equal to 40 mg/dL    LDL Cholesterol  Desirable:  <100mg/dL  Above Desirable:  100-129 mg/dL   Borderline High:  130-159 mg/dL   High:  160-189 mg/dL   Very High:  >= 190 mg/dL    Non HDL Cholesterol  Desirable:  130 mg/dL  Above Desirable:  130-159 mg/dL  Borderline High:  160-189 mg/dL  High:  190-219 mg/dL  Very High:  Greater than or equal to 220 mg/dL   Hemoglobin A1c   Result Value Ref Range    Hemoglobin A1C 6.8 (H) <5.7 %      Comment:      Normal <5.7%   Prediabetes 5.7-6.4%    Diabetes 6.5% or higher     Note: Adopted from ADA consensus guidelines.       If you have any questions or concerns, please call the clinic at the number listed above.       Sincerely,      JUAN ALBERTO Moncada CNP
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
## 2023-10-11 LAB
CHOLEST SERPL-MCNC: 221 MG/DL
HDLC SERPL-MCNC: 45 MG/DL
LDLC SERPL CALC-MCNC: 133 MG/DL
NONHDLC SERPL-MCNC: 176 MG/DL
TRIGL SERPL-MCNC: 213 MG/DL

## 2023-12-19 DIAGNOSIS — F41.9 ANXIETY: ICD-10-CM

## 2023-12-22 RX ORDER — HYDROXYZINE HYDROCHLORIDE 25 MG/1
25 TABLET, FILM COATED ORAL 3 TIMES DAILY PRN
Qty: 90 TABLET | Refills: 0 | Status: SHIPPED | OUTPATIENT
Start: 2023-12-22

## 2024-01-08 DIAGNOSIS — E11.65 TYPE 2 DIABETES MELLITUS WITH HYPERGLYCEMIA, WITHOUT LONG-TERM CURRENT USE OF INSULIN (H): ICD-10-CM

## 2024-01-11 RX ORDER — METFORMIN HCL 500 MG
2000 TABLET, EXTENDED RELEASE 24 HR ORAL
Qty: 120 TABLET | Refills: 2 | Status: SHIPPED | OUTPATIENT
Start: 2024-01-11

## 2024-01-11 NOTE — TELEPHONE ENCOUNTER
metFORMIN (GLUCOPHAGE XR) 500 MG 24 hr tablet 120 tablet 1 10/10/2023     Last Office Visit : 10/10/2023  [facility]  Mirian Lo, JUAN ALBERTO CNP     Future Office visit:    1/16/2024       Hemoglobin A1C   Date Value Ref Range Status   10/10/2023 6.8 (H) <5.7 % Final     Comment:     Normal <5.7%   Prediabetes 5.7-6.4%    Diabetes 6.5% or higher     Note: Adopted from ADA consensus guidelines.

## 2024-01-16 ENCOUNTER — OFFICE VISIT (OUTPATIENT)
Dept: FAMILY MEDICINE | Facility: CLINIC | Age: 34
End: 2024-01-16
Payer: MEDICARE

## 2024-01-16 VITALS
SYSTOLIC BLOOD PRESSURE: 135 MMHG | HEIGHT: 65 IN | DIASTOLIC BLOOD PRESSURE: 93 MMHG | OXYGEN SATURATION: 94 % | HEART RATE: 105 BPM | RESPIRATION RATE: 18 BRPM | WEIGHT: 218.9 LBS | TEMPERATURE: 97.9 F | BODY MASS INDEX: 36.47 KG/M2

## 2024-01-16 DIAGNOSIS — R06.2 WHEEZING: Primary | ICD-10-CM

## 2024-01-16 DIAGNOSIS — R06.83 SNORING: ICD-10-CM

## 2024-01-16 DIAGNOSIS — E78.5 HYPERLIPIDEMIA LDL GOAL <100: ICD-10-CM

## 2024-01-16 DIAGNOSIS — F33.1 MDD (MAJOR DEPRESSIVE DISORDER), RECURRENT EPISODE, MODERATE (H): ICD-10-CM

## 2024-01-16 DIAGNOSIS — J30.89 NON-SEASONAL ALLERGIC RHINITIS, UNSPECIFIED TRIGGER: ICD-10-CM

## 2024-01-16 DIAGNOSIS — E11.65 TYPE 2 DIABETES MELLITUS WITH HYPERGLYCEMIA, WITHOUT LONG-TERM CURRENT USE OF INSULIN (H): ICD-10-CM

## 2024-01-16 DIAGNOSIS — J45.30 MILD PERSISTENT ASTHMA WITHOUT COMPLICATION: ICD-10-CM

## 2024-01-16 LAB — HBA1C MFR BLD: 9.9 %

## 2024-01-16 PROCEDURE — 80061 LIPID PANEL: CPT | Mod: ORL | Performed by: NURSE PRACTITIONER

## 2024-01-16 PROCEDURE — 83036 HEMOGLOBIN GLYCOSYLATED A1C: CPT | Mod: ORL | Performed by: NURSE PRACTITIONER

## 2024-01-16 RX ORDER — FLUTICASONE PROPIONATE 50 MCG
1-2 SPRAY, SUSPENSION (ML) NASAL DAILY
Qty: 11.1 ML | Refills: 2 | Status: SHIPPED | OUTPATIENT
Start: 2024-01-16 | End: 2024-06-05

## 2024-01-16 RX ORDER — BUPROPION HYDROCHLORIDE 150 MG/1
150 TABLET ORAL EVERY MORNING
Qty: 90 TABLET | Refills: 1 | Status: SHIPPED | OUTPATIENT
Start: 2024-01-16

## 2024-01-16 RX ORDER — BUDESONIDE AND FORMOTEROL FUMARATE DIHYDRATE 80; 4.5 UG/1; UG/1
2 AEROSOL RESPIRATORY (INHALATION) 2 TIMES DAILY
Qty: 10.2 G | Refills: 1 | Status: SHIPPED | OUTPATIENT
Start: 2024-01-16 | End: 2024-04-16

## 2024-01-16 RX ORDER — LORATADINE 10 MG/1
10 TABLET ORAL DAILY
Qty: 90 TABLET | Refills: 1 | Status: SHIPPED | OUTPATIENT
Start: 2024-01-16

## 2024-01-16 ASSESSMENT — PAIN SCALES - GENERAL: PAINLEVEL: NO PAIN (0)

## 2024-01-16 NOTE — LETTER
January 17, 2024      Pedro READ Janiya Armin  [address]     [place] [address]        Dear Mr.Siegl Funez,    We are writing to inform you of your test results.  Test results indicate you may require additional follow up, see comment below.     Jet Vasquez, here are your lab results. Your 3 month blood sugar average (A1c) has gone up again. Taking your liraglutide and working on health diet and weight loss are so helpful for you. It would be good to get back on track with those again. We should recheck in 3 months. Your lipid (cholesterol) levels are higher than last time. Your weight, diet, high cholesterol levels and diabetes increase your cardiac risk. Please schedule an appointment if you would like to discuss. There are options through the [facility] such as the weight management clinic, endocrine diabetes specialists. Thank you      Resulted Orders   Hemoglobin A1c   Result Value Ref Range    Hemoglobin A1C 9.9 (H) <5.7 %      Comment:      Normal <5.7%   Prediabetes 5.7-6.4%    Diabetes 6.5% or higher     Note: Adopted from ADA consensus guidelines.   Lipid panel reflex to direct LDL Fasting   Result Value Ref Range    Cholesterol 286 (H) <200 mg/dL    Triglycerides 269 (H) <150 mg/dL    Direct Measure HDL 43 >=40 mg/dL    LDL Cholesterol Calculated 189 (H) <=100 mg/dL    Non HDL Cholesterol 243 (H) <130 mg/dL    Patient Fasting > 8hrs? Unknown     Narrative    Cholesterol  Desirable:  <200 mg/dL    Triglycerides  Normal:  Less than 150 mg/dL  Borderline High:  150-199 mg/dL  High:  200-499 mg/dL  Very High:  Greater than or equal to 500 mg/dL    Direct Measure HDL  Female:  Greater than or equal to 50 mg/dL   Male:  Greater than or equal to 40 mg/dL    LDL Cholesterol  Desirable:  <100mg/dL  Above Desirable:  100-129 mg/dL   Borderline High:  130-159 mg/dL   High:  160-189 mg/dL   Very High:  >= 190 mg/dL    Non HDL Cholesterol  Desirable:  130 mg/dL  Above Desirable:  130-159 mg/dL  Borderline High:   160-189 mg/dL  High:  190-219 mg/dL  Very High:  Greater than or equal to 220 mg/dL       If you have any questions or concerns, please call the clinic at the number listed above.       Sincerely,      JUAN ALBERTO Moncada CNP

## 2024-01-16 NOTE — PATIENT INSTRUCTIONS
Diabetes  Resume liraglutide every day. Set an alarm on your phone to remind you  Check your feet nightly for sores  Continue working on weight loss, healthy diet: lots of vegetables, low carbohydrate.   Continue other medications.   Follow up in 3 months    Snoring  Referred for sleep study    Allergic rhinitis and wheezing  Begin loratadine 10mg daily  Add flonase 1-2 puffs into each nostril daily  Add Symbicort: combination inhaled corticosteroid and broncho dilator. 2 puffs twice a day every day and can use as rescue if wheezing a lot up to every 6h as needed    Hyperlipidemia  Recheck lipids today. Low fat diet.

## 2024-01-16 NOTE — NURSING NOTE
"ROOM:  MYRNA MARTINEZ    Preferred Name: Pedro     How did you hear about us?  Current Patient    33 year old  Chief Complaint   Patient presents with     Diabetes     Clinic Care Coordination - Follow-up     Diabetes Follow Up       Blood pressure (!) 135/93, pulse 105, temperature 97.9  F (36.6  C), temperature source Oral, resp. rate 18, height 1.648 m (5' 4.9\"), weight 99.3 kg (218 lb 14.4 oz), SpO2 94%. Body mass index is 36.54 kg/m .  BP completed using cuff size:        Patient Active Problem List   Diagnosis     Diabetes mellitus, type 2 (H)     Morbid obesity (H)     MDD (major depressive disorder), recurrent episode, moderate (H)     Intertrigo     GERD (gastroesophageal reflux disease)     REBA (generalized anxiety disorder)     Essential hypertension     Autism spectrum disorder     Chronic kidney disease, stage 1       Wt Readings from Last 2 Encounters:   01/16/24 99.3 kg (218 lb 14.4 oz)   10/10/23 93.5 kg (206 lb 3.2 oz)     BP Readings from Last 3 Encounters:   01/16/24 (!) 135/93   10/10/23 137/85   07/11/23 109/80       No Known Allergies    Current Outpatient Medications   Medication     blood glucose (NO BRAND SPECIFIED) lancets standard     blood glucose (NO BRAND SPECIFIED) test strip     blood glucose monitoring (ACCU-CHEK FERMIN PLUS) meter device kit     blood glucose monitoring (ACCU-CHEK MULTICLIX) lancets     Blood Pressure Monitoring (BLOOD PRESSURE CUFF) MISC     buPROPion (WELLBUTRIN XL) 150 MG 24 hr tablet     ciprofloxacin-dexAMETHasone (CIPRODEX) 0.3-0.1 % otic suspension     hydrOXYzine HCl (ATARAX) 25 MG tablet     insulin pen needle (32G X 4 MM) 32G X 4 MM miscellaneous     liraglutide (VICTOZA) 18 MG/3ML solution     lisinopril (ZESTRIL) 10 MG tablet     metFORMIN (GLUCOPHAGE XR) 500 MG 24 hr tablet     Skin Protectants, Misc. (EUCERIN) cream     No current facility-administered medications for this visit.       Social History     Tobacco Use     Smoking status: Former " "    Packs/day: 0.00     Years: 4.00     Additional pack years: 0.00     Total pack years: 0.00     Types: Cigarettes     Quit date: 2020     Years since quittin.0     Smokeless tobacco: Current     Types: Chew   Vaping Use     Vaping Use: Never used   Substance Use Topics     Alcohol use: Yes     Comment: 1 beer periodically. Has binged in the past     Drug use: No       Honoring Choices - Health Care Directive Guide offered to patient at time of visit.    Health Maintenance Due   Topic Date Due     ADVANCE CARE PLANNING  Never done     DEPRESSION ACTION PLAN  Never done     HEPATITIS B IMMUNIZATION (1 of 3 - 3-dose series) Never done     Pneumococcal Vaccine: Pediatrics (0 to 5 Years) and At-Risk Patients (6 to 64 Years) (1 of 2 - PCV) Never done     HEPATITIS A IMMUNIZATION (1 of 2 - Risk 2-dose series) Never done     MEDICARE ANNUAL WELLNESS VISIT  2022     PHQ-9  2023     COVID-19 Vaccine (3 - 2023-24 season) 2023     A1C  01/10/2024       Immunization History   Administered Date(s) Administered     COVID-19 Vaccine (Miah) 2021     Influenza Vaccine >6 months,quad, PF 10/04/2022, 10/10/2023     TDAP Vaccine (Adacel) 2019       No results found for: \"PAP\"    Recent Labs   Lab Test 10/10/23  1504 23  1517 23  1619 10/04/22  1531 22  1456 22  1417 21  1618 21  1452   A1C 6.8* 7.2* 11.0* 11.2*   < >  --    < > 6.9   *  --  158* 167*  --   --   --  124*   HDL 45  --  34* 35*  --   --   --  42   TRIG 213*  --  289* 279*  --   --   --  119   ALT  --   --  34  --   --   --   --  40   CR  --   --  0.89  --   --  0.85  --  0.75   GFRESTIMATED  --   --  >90  --   --  >90  --  >90   ALBUMIN  --   --  4.6  --   --   --   --  4.0   POTASSIUM  --   --  4.9  --   --  5.0  --  4.1   TSH  --   --   --   --   --   --   --  1.39    < > = values in this interval not displayed.           2024     2:24 PM 2023     1:11 PM   PHQ-2 (  Pfizer) "   Q1: Little interest or pleasure in doing things 0 0   Q2: Feeling down, depressed or hopeless 0 0   PHQ-2 Score 0 0           9/7/2021    11:28 AM 6/14/2022     2:09 PM 10/4/2022     2:36 PM 10/4/2022     2:42 PM   PHQ-9 SCORE   PHQ-9 Total Score 2 6 4 4           8/10/2021     4:24 PM 9/7/2021    11:28 AM 10/4/2022     2:42 PM   REBA-7 SCORE   Total Score 16 2 3            No data to display                Chase Yap    January 16, 2024 2:27 PM

## 2024-01-16 NOTE — PROGRESS NOTES
HPI       Pedro Funez is a 33 year old  who presents for   Chief Complaint   Patient presents with    Diabetes    Clinic Care Coordination - Follow-up     Diabetes Follow Up       33 year-old male presents to clinic for DM Type 2 follow up. Accompanied by friend today.  Has been on Metformin and Liraglutide for DM control. Last A1c 6.8 10/10/23. Taking Metformin regularly but not liraglutide. Indicates he has only taken a few times. Denies barriers to use, indicates he forgets. Takes meds at night. Working on healthy diet. Feels like eating better than he used to. Could be better on [facility] and [facility]. Does not walk as much in the winter. Has not checked BS too often. Does not think he has had any incidents of low BS. Last check was 120 in the evening. Does not really check in the am.   Denies numbness or tingling fingers or toes.   Denies chest pain, irregular heart beat  Notes some tightness in breathing since weather got cold. Feels SOB when first out. Sometimes with walking.   Sleep: snores at night. Friend indicates he sometimes wakes up gasping. Does not feel rested on waking.  Was referred for sleep study some time ago but did not go.      Problem, Medication and Allergy Lists were reviewed and updated if needed.     Patient Active Problem List    Diagnosis Date Noted    Chronic kidney disease, stage 1 04/18/2023     Priority: Medium    Diabetes mellitus, type 2 (H) 08/17/2021     Priority: Medium    Morbid obesity (H) 08/17/2021     Priority: Medium    MDD (major depressive disorder), recurrent episode, moderate (H) 10/10/2018     Priority: Medium    REBA (generalized anxiety disorder) 06/21/2018     Priority: Medium    Autism spectrum disorder 06/21/2018     Priority: Medium    Intertrigo 08/03/2016     Priority: Medium    Essential hypertension 08/03/2016     Priority: Medium    GERD (gastroesophageal reflux disease) 05/30/2012     Priority: Medium         Current  Outpatient Medications   Medication Sig Dispense Refill    blood glucose (NO BRAND SPECIFIED) lancets standard Use to test blood sugar one times daily or as directed. 100 lancet. 1    blood glucose (NO BRAND SPECIFIED) test strip Use to test blood sugar one times daily or as directed. 200 strip 4    blood glucose monitoring (ACCU-CHEK FERMIN PLUS) meter device kit Use to test blood sugars one time daily or as directed. 1 kit 0    blood glucose monitoring (ACCU-CHEK MULTICLIX) lancets Use to test blood sugar 1 times daily or as directed. 102 each 1    Blood Pressure Monitoring (BLOOD PRESSURE CUFF) MISC 1 Units daily 1 each 0    budesonide-formoterol (SYMBICORT) 80-4.5 MCG/ACT Inhaler Inhale 2 puffs into the lungs 2 times daily 10.2 g 1    buPROPion (WELLBUTRIN XL) 150 MG 24 hr tablet Take 1 tablet (150 mg) by mouth every morning 90 tablet 1    fluticasone (FLONASE) 50 MCG/ACT nasal spray Spray 1-2 sprays into both nostrils daily 11.1 mL 2    hydrOXYzine HCl (ATARAX) 25 MG tablet TAKE 1 TABLET (25 MG) BY MOUTH 3 TIMES DAILY AS NEEDED FOR ANXIETY 90 tablet 0    insulin pen needle (32G X 4 MM) 32G X 4 MM miscellaneous Use 1 pen needles daily or as directed. 30 each 11    liraglutide (VICTOZA) 18 MG/3ML solution Inject 1.2 mg Subcutaneous daily 3 mL 2    lisinopril (ZESTRIL) 10 MG tablet Take 1 tablet (10 mg) by mouth daily 90 tablet 1    loratadine (CLARITIN) 10 MG tablet Take 1 tablet (10 mg) by mouth daily 90 tablet 1    metFORMIN (GLUCOPHAGE XR) 500 MG 24 hr tablet Take 4 tablets (2,000 mg) by mouth daily (with dinner) 120 tablet 2    mometasone-formoterol (DULERA) 100-5 MCG/ACT inhaler Inhale 2 puffs into the lungs 2 times daily 13 g 2    Skin Protectants, Misc. (EUCERIN) cream Apply topically 2 times daily as needed for dry skin 240 g 1       No Known Allergies.    Patient is an established patient of this clinic..         Review of Systems:   Review of Systems  GEN: Weight gain. Denies fever. Some fatigue.  "  HEENT: Nasal stuffiness, especially nose. No discharge.   CV: Negative   RESP: as per HPI No history of asthma but periodic wheezing when outdoors walking in the cold. Some SOB with activity.   ENDO: as per HPI. Hyperlipidemia. Was going to work on diet, due for recheck.   NEURO: as per HPI  MOOD: doing okay. Would like refill of Wellbutrin. Looks like he is taking somewhat haphazardly as last RX was 10/23 with 1 refill.        Physical Exam:     Vitals:    01/16/24 1420 01/16/24 1426   BP: (!) 131/96 (!) 135/93   BP Location: Left arm Left arm   Patient Position: Sitting Sitting   Cuff Size: Adult Regular Adult Regular   Pulse:  105   Resp: 18    Temp: 97.9  F (36.6  C)    TempSrc: Oral    SpO2: 94%    Weight: 99.3 kg (218 lb 14.4 oz)    Height: 1.648 m (5' 4.9\")      Body mass index is 36.54 kg/m .  Vital signs normal except BP elevated. Weight up 12 pounds from previous. BMI 36.54     Physical Exam  GEN: alert male in NAD.Slightly unkempt in appearance today.   EYES: clear.   ENT: TMs gray, scarring RTM with small amount of wax. LTM gray with LR, canal clear. Nose: Edematous turbinates, R>L, no erythema. Scant dry discharge right. Oral mucosa pink moist, scant post nasal drainage.   Neck supple. Lymph Negative.   RESP: Lungs clear to auscultation with air entry throughout  CV: HRRR, S1,S2, no MRG. Negative peripheral edema. Pedal pulses +2. CR<3 seconds  NEURO: sensation feet intact to monofilament all aspects. Appropriate to conversation  SKIN: Slight discoloration: pale pink lower extermities lateral aspects bilaterally.mildly erythematous area along right great toenail medial aspect, no swelling and nontender. Calloused heels bilaterally. Dandruff noted.     Results:   Results are ordered and pending    Assessment and Plan      1. MDD (major depressive disorder), recurrent episode, moderate (H)  Doing well on wellbutrin XL. Refilled.   - buPROPion (WELLBUTRIN XL) 150 MG 24 hr tablet; Take 1 tablet (150 mg) " by mouth every morning  Dispense: 90 tablet; Refill: 1    2. Wheezing  Likely mild persistent asthma given signs and symptoms. No wheezing on exam today. Will trial combined ICS and fomoterol. Pharmacy contacted provider, insurance does not cover symbicort. Changed to Dulera and resent.   - budesonide-formoterol (SYMBICORT) 80-4.5 MCG/ACT Inhaler; Inhale 2 puffs into the lungs 2 times daily  Dispense: 10.2 g; Refill: 1    3. Snoring  Needs evaluation for sleep apnea given obesity, snoring history, lack of restedness on waking.   - Adult Sleep Eval & Management Referral; Future    4. Type 2 diabetes mellitus with hyperglycemia, without long-term current use of insulin (H)  Check A1c Today. Discussed medications and lifestyle. Request he set alarm to remember to take liraglutide as it was effective in reducing BS and promoting weight loss.  He verbalizes understanding. Has cyclical pattern of good control followed by poor control. Usually mimics self care. Encouraged to try to exercise. Discussed doing chair yoga or simple stretches if does not like to walk out side in the cold. Follow up next in 3 months.   - Hemoglobin A1c; Future  - Hemoglobin A1c    5. Hyperlipidemia LDL goal <100  REcheck lipids today. He was to work on diet, exercise and weight loss. Unlikely to see improvement as has gained 12 pounds.   - Lipid panel reflex to direct LDL Fasting; Future  - Lipid panel reflex to direct LDL Fasting    6. Non-seasonal allergic rhinitis, unspecified trigger  Discussed allergy symptoms. Trial treatment. OK to use humidifier but must keep clean.   - loratadine (CLARITIN) 10 MG tablet; Take 1 tablet (10 mg) by mouth daily  Dispense: 90 tablet; Refill: 1  - fluticasone (FLONASE) 50 MCG/ACT nasal spray; Spray 1-2 sprays into both nostrils daily  Dispense: 11.1 mL; Refill: 2    7. Mild persistent asthma without complication  Will trial Dulera as above.  To let provider know if is helping. Reviewed use and administration.    - mometasone-formoterol (DULERA) 100-5 MCG/ACT inhaler; Inhale 2 puffs into the lungs 2 times daily  Dispense: 13 g; Refill: 2         There are no discontinued medications.    Options for treatment and follow-up care were reviewed with the patient. Pedro Funez  engaged in the decision making process and verbalized understanding of the options discussed and agreed with the final plan.    JUAN ALBERTO Moncada CNP

## 2024-01-17 LAB
CHOLEST SERPL-MCNC: 286 MG/DL
FASTING STATUS PATIENT QL REPORTED: ABNORMAL
HDLC SERPL-MCNC: 43 MG/DL
LDLC SERPL CALC-MCNC: 189 MG/DL
NONHDLC SERPL-MCNC: 243 MG/DL
TRIGL SERPL-MCNC: 269 MG/DL

## 2024-01-30 ENCOUNTER — TELEPHONE (OUTPATIENT)
Dept: FAMILY MEDICINE | Facility: CLINIC | Age: 34
End: 2024-01-30

## 2024-04-10 ENCOUNTER — TELEPHONE (OUTPATIENT)
Dept: FAMILY MEDICINE | Facility: CLINIC | Age: 34
End: 2024-04-10

## 2024-04-10 NOTE — LETTER
[facility]  [address] [address]  [phone]    4/12/2024       Dear Pedro    We care about your health and wanted to reach out to remind you of the importance of scheduling a follow-up visit with your primary care provider regarding your asthma.    Even if you feel like your asthma is well-controlled, regular check-ups are crucial for several reasons:    Personalized Care: Your primary care provider understands your unique health needs. Regular visits allow them to tailor your asthma management plan to suit you best    Prevention is Key: Asthma symptoms can vary over time. Regular check-ups help identify any changes in your condition early on, allowing for timely adjustments to your treatment plan and preventing potential flare-ups.    Hidden Changes: Asthma symptoms can fluctuate, and sometimes changes can occur without noticeable warning signs. Regular follow-ups help detect subtle changes in your condition that may not be apparent on a day-to-day basis.    Medication Management: Your provider will assess the effectiveness of your current medications and make adjustments if necessary. This ensures that you are using the right medications in the right way to keep your asthma in check.    Medication Effectiveness: Your primary care provider will assess how well your current medications are working. Sometimes, adjustments may be needed to optimize your treatment and ensure you're using the most effective medications for your unique needs.    Breathing Tests: Periodic lung function tests help evaluate how well your lungs are working. These tests are essential for monitoring the progression of your asthma and adjusting treatment accordingly.    Preventive Measures: Regular visits allow your healthcare team to discuss preventive measures, such as flu vaccinations, that are essential for individuals with asthma. These measures can help minimize the risk of exacerbations and keep you feeling  your best.    Lifestyle Changes: Your provider can discuss any lifestyle changes or environmental factors that may impact your asthma. This can include workplace conditions, exposure to allergens, or changes in your daily routine.      Please take a moment to schedule a follow-up visit with our clinic. Your health is our priority, and we want to ensure that you receive the best care possible, tailored to your individual needs.    If you have any questions or concerns, our team is here to assist you. Don't hesitate to reach out. We appreciate your proactive approach to your health, and we look forward to seeing you soon.      Wishing you continued good health,  Your Nurse Practitioners Clinic Team  [phone]

## 2024-04-10 NOTE — TELEPHONE ENCOUNTER
Patient Quality Outreach    Patient is due for the following:   Asthma  -  ACT needed and Asthma follow-up visit    Next Steps:   Schedule a office visit for asthma follow up    Type of outreach:    Phone, left message for patient/parent to call back.      Questions for provider review:    None           Radha Raymond, EMT

## 2024-04-12 NOTE — TELEPHONE ENCOUNTER
Patient Quality Outreach    Patient is due for the following:   Asthma  -  ACT needed and Asthma follow-up visit    Next Steps:   Schedule a office visit for asthma follow up    Type of outreach:    Phone, left message for patient/parent to call back.    Next Steps:  Reach out within 90 days via Letter.    Max number of attempts reached: Yes. Will try again in 90 days if patient still on fail list.    Questions for provider review:    None           Radha Ryamond, EMT  Chart routed to Provider.

## 2024-04-15 DIAGNOSIS — E11.65 TYPE 2 DIABETES MELLITUS WITH HYPERGLYCEMIA, WITHOUT LONG-TERM CURRENT USE OF INSULIN (H): ICD-10-CM

## 2024-04-16 ENCOUNTER — OFFICE VISIT (OUTPATIENT)
Dept: FAMILY MEDICINE | Facility: CLINIC | Age: 34
End: 2024-04-16
Payer: MEDICARE

## 2024-04-16 VITALS
BODY MASS INDEX: 36.81 KG/M2 | WEIGHT: 215.6 LBS | HEIGHT: 64 IN | HEART RATE: 121 BPM | OXYGEN SATURATION: 96 % | DIASTOLIC BLOOD PRESSURE: 89 MMHG | RESPIRATION RATE: 18 BRPM | TEMPERATURE: 98.2 F | SYSTOLIC BLOOD PRESSURE: 140 MMHG

## 2024-04-16 DIAGNOSIS — E11.65 TYPE 2 DIABETES MELLITUS WITH HYPERGLYCEMIA, WITHOUT LONG-TERM CURRENT USE OF INSULIN (H): Primary | ICD-10-CM

## 2024-04-16 DIAGNOSIS — R51.9 NONINTRACTABLE HEADACHE, UNSPECIFIED CHRONICITY PATTERN, UNSPECIFIED HEADACHE TYPE: ICD-10-CM

## 2024-04-16 DIAGNOSIS — E78.5 HYPERLIPIDEMIA LDL GOAL <100: ICD-10-CM

## 2024-04-16 DIAGNOSIS — I10 BENIGN ESSENTIAL HYPERTENSION: ICD-10-CM

## 2024-04-16 DIAGNOSIS — F41.9 ANXIETY: ICD-10-CM

## 2024-04-16 DIAGNOSIS — Z91.89 NEED FOR DENTAL CARE: ICD-10-CM

## 2024-04-16 LAB — HBA1C MFR BLD: 8.5 %

## 2024-04-16 PROCEDURE — 80061 LIPID PANEL: CPT | Mod: ORL | Performed by: NURSE PRACTITIONER

## 2024-04-16 PROCEDURE — 83036 HEMOGLOBIN GLYCOSYLATED A1C: CPT | Mod: ORL | Performed by: NURSE PRACTITIONER

## 2024-04-16 RX ORDER — LISINOPRIL 10 MG/1
10 TABLET ORAL DAILY
Qty: 90 TABLET | Refills: 1 | Status: SHIPPED | OUTPATIENT
Start: 2024-04-16

## 2024-04-16 ASSESSMENT — PATIENT HEALTH QUESTIONNAIRE - PHQ9
SUM OF ALL RESPONSES TO PHQ QUESTIONS 1-9: 4
10. IF YOU CHECKED OFF ANY PROBLEMS, HOW DIFFICULT HAVE THESE PROBLEMS MADE IT FOR YOU TO DO YOUR WORK, TAKE CARE OF THINGS AT HOME, OR GET ALONG WITH OTHER PEOPLE: NOT DIFFICULT AT ALL
SUM OF ALL RESPONSES TO PHQ QUESTIONS 1-9: 4

## 2024-04-16 ASSESSMENT — ASTHMA QUESTIONNAIRES
QUESTION_5 LAST FOUR WEEKS HOW WOULD YOU RATE YOUR ASTHMA CONTROL: WELL CONTROLLED
QUESTION_3 LAST FOUR WEEKS HOW OFTEN DID YOUR ASTHMA SYMPTOMS (WHEEZING, COUGHING, SHORTNESS OF BREATH, CHEST TIGHTNESS OR PAIN) WAKE YOU UP AT NIGHT OR EARLIER THAN USUAL IN THE MORNING: NOT AT ALL
ACT_TOTALSCORE: 21
QUESTION_1 LAST FOUR WEEKS HOW MUCH OF THE TIME DID YOUR ASTHMA KEEP YOU FROM GETTING AS MUCH DONE AT WORK, SCHOOL OR AT HOME: NONE OF THE TIME
QUESTION_2 LAST FOUR WEEKS HOW OFTEN HAVE YOU HAD SHORTNESS OF BREATH: NOT AT ALL
ACT_TOTALSCORE: 21
QUESTION_4 LAST FOUR WEEKS HOW OFTEN HAVE YOU USED YOUR RESCUE INHALER OR NEBULIZER MEDICATION (SUCH AS ALBUTEROL): ONE OR TWO TIMES PER DAY

## 2024-04-16 ASSESSMENT — ANXIETY QUESTIONNAIRES
8. IF YOU CHECKED OFF ANY PROBLEMS, HOW DIFFICULT HAVE THESE MADE IT FOR YOU TO DO YOUR WORK, TAKE CARE OF THINGS AT HOME, OR GET ALONG WITH OTHER PEOPLE?: NOT DIFFICULT AT ALL
GAD7 TOTAL SCORE: 5
7. FEELING AFRAID AS IF SOMETHING AWFUL MIGHT HAPPEN: NOT AT ALL
GAD7 TOTAL SCORE: 5
6. BECOMING EASILY ANNOYED OR IRRITABLE: SEVERAL DAYS
2. NOT BEING ABLE TO STOP OR CONTROL WORRYING: SEVERAL DAYS
GAD7 TOTAL SCORE: 5
7. FEELING AFRAID AS IF SOMETHING AWFUL MIGHT HAPPEN: NOT AT ALL
IF YOU CHECKED OFF ANY PROBLEMS ON THIS QUESTIONNAIRE, HOW DIFFICULT HAVE THESE PROBLEMS MADE IT FOR YOU TO DO YOUR WORK, TAKE CARE OF THINGS AT HOME, OR GET ALONG WITH OTHER PEOPLE: NOT DIFFICULT AT ALL
1. FEELING NERVOUS, ANXIOUS, OR ON EDGE: SEVERAL DAYS
4. TROUBLE RELAXING: SEVERAL DAYS
3. WORRYING TOO MUCH ABOUT DIFFERENT THINGS: SEVERAL DAYS
5. BEING SO RESTLESS THAT IT IS HARD TO SIT STILL: NOT AT ALL

## 2024-04-16 NOTE — PATIENT INSTRUCTIONS
Diabetes  - Checking labs: A1c (3-month average)  - You have been checking your blood sugar once a week  - Check your blood sugar twice a day for at least one week (once when you first wake up and once before you go to bed)  - Use your phone to set alarm reminders to take your medications  - PLEASE TAKE YOUR MEDICATIONS EVERY DAY!!!  - Victoza needs to be injected once a day  - Take 4 tablets of metformin once daily with dinner  - Continue making and eating home-cooked meals  - As the weather gets nicer, get outside and walk!  - Will check with the pharmacist to see if we can get you a continuous glucose monitor (CGM) --> Nuria will contact you    Stress  - Practice relaxation techniques such as mindfulness, deep breathing exercises, and progressive muscle relaxation  - Stop drinking energy drinks  - Eat and drink regularly  - Exercise - walk outside  - Protect your sleep and practice good sleep hygiene  - Hydroxyzine is safe to take and you can feel free to use that to help manage your anxiety    Migraine  - Long-term history of migraine since 3rd grade  - Eat and drink regularly  - Use gentle stretches to relieve tension  - Stop drinking energy drinks for at least once a month and then return to the clinic for reevaluation of migraine  - Discussed sumatriptan  - May consider other treatment options if migraines continue or worsen  - If you experience a thunder clap headache, the worst headache of your life, or it wakens you from sleep, report to the ED    Checking lipids    Follow up in 1 month

## 2024-04-16 NOTE — NURSING NOTE
"ROOM:1  MYRNA MARTINEZ    Preferred Name: Pedro     How did you hear about us?  Current Patient     Services Provided? No    33 year old  Chief Complaint   Patient presents with    Diabetes    Asthma    Anxiety     Anxiety has been a little up       Blood pressure 135/89, pulse (!) 121, temperature 98.2  F (36.8  C), temperature source Oral, resp. rate 18, height 1.623 m (5' 3.9\"), weight 97.8 kg (215 lb 9.6 oz), SpO2 96%. Body mass index is 37.12 kg/m .  BP completed using cuff size:        Patient Active Problem List   Diagnosis    Diabetes mellitus, type 2 (H)    Morbid obesity (H)    MDD (major depressive disorder), recurrent episode, moderate (H)    Intertrigo    GERD (gastroesophageal reflux disease)    REBA (generalized anxiety disorder)    Essential hypertension    Autism spectrum disorder    Chronic kidney disease, stage 1    Wheezing       Wt Readings from Last 2 Encounters:   04/16/24 97.8 kg (215 lb 9.6 oz)   01/16/24 99.3 kg (218 lb 14.4 oz)     BP Readings from Last 3 Encounters:   04/16/24 135/89   01/16/24 (!) 135/93   10/10/23 137/85       No Known Allergies    Current Outpatient Medications   Medication Sig Dispense Refill    blood glucose (NO BRAND SPECIFIED) lancets standard Use to test blood sugar one times daily or as directed. 100 lancet. 1    blood glucose (NO BRAND SPECIFIED) test strip Use to test blood sugar one times daily or as directed. 200 strip 4    blood glucose monitoring (ACCU-CHEK FERMIN PLUS) meter device kit Use to test blood sugars one time daily or as directed. 1 kit 0    blood glucose monitoring (ACCU-CHEK MULTICLIX) lancets Use to test blood sugar 1 times daily or as directed. 102 each 1    Blood Pressure Monitoring (BLOOD PRESSURE CUFF) MISC 1 Units daily 1 each 0    budesonide-formoterol (SYMBICORT) 80-4.5 MCG/ACT Inhaler Inhale 2 puffs into the lungs 2 times daily 10.2 g 1    buPROPion (WELLBUTRIN XL) 150 MG 24 hr tablet Take 1 tablet (150 mg) by mouth " every morning 90 tablet 1    fluticasone (FLONASE) 50 MCG/ACT nasal spray Spray 1-2 sprays into both nostrils daily 11.1 mL 2    hydrOXYzine HCl (ATARAX) 25 MG tablet TAKE 1 TABLET (25 MG) BY MOUTH 3 TIMES DAILY AS NEEDED FOR ANXIETY 90 tablet 0    insulin pen needle (32G X 4 MM) 32G X 4 MM miscellaneous Use 1 pen needles daily or as directed. 30 each 11    liraglutide (VICTOZA) 18 MG/3ML solution Inject 1.2 mg Subcutaneous daily 3 mL 2    lisinopril (ZESTRIL) 10 MG tablet Take 1 tablet (10 mg) by mouth daily 90 tablet 1    loratadine (CLARITIN) 10 MG tablet Take 1 tablet (10 mg) by mouth daily 90 tablet 1    metFORMIN (GLUCOPHAGE XR) 500 MG 24 hr tablet Take 4 tablets (2,000 mg) by mouth daily (with dinner) 120 tablet 2    mometasone-formoterol (DULERA) 100-5 MCG/ACT inhaler Inhale 2 puffs into the lungs 2 times daily 13 g 2    Skin Protectants, Misc. (EUCERIN) cream Apply topically 2 times daily as needed for dry skin 240 g 1     No current facility-administered medications for this visit.       Social History     Tobacco Use    Smoking status: Former     Current packs/day: 0.00     Types: Cigarettes     Quit date: 2016     Years since quittin.2    Smokeless tobacco: Current     Types: Chew   Vaping Use    Vaping status: Never Used   Substance Use Topics    Alcohol use: Yes     Comment: 1 beer periodically. Has binged in the past    Drug use: No       Honoring Choices - Health Care Directive Guide offered to patient at time of visit.    Health Maintenance Due   Topic Date Due    ASTHMA ACTION PLAN  Never done    ADVANCE CARE PLANNING  Never done    DEPRESSION ACTION PLAN  Never done    Pneumococcal Vaccine: Pediatrics (0 to 5 Years) and At-Risk Patients (6 to 64 Years) (1 of 2 - PCV) Never done    HEPATITIS A IMMUNIZATION (1 of 2 - Risk 2-dose series) Never done    HEPATITIS B IMMUNIZATION (1 of 3 - 19+ 3-dose series) Never done    MEDICARE ANNUAL WELLNESS VISIT  2022    COVID-19 Vaccine (3 - 2023-24  "season) 09/01/2023    BMP  04/11/2024    MICROALBUMIN  04/11/2024    DIABETIC FOOT EXAM  04/11/2024    A1C  04/16/2024       Immunization History   Administered Date(s) Administered    COVID-19 Monovalent 12+ (Pfizer 2022) 01/25/2022    COVID-19 Vaccine (Miah) 05/08/2021    Historical DTP/aP 1988, 1988, 02/01/1989, 04/10/1990, 09/01/1993    Influenza Vaccine >6 months,quad, PF 09/25/2016, 10/23/2019, 10/04/2022, 10/10/2023    Influenza, seasonal, injectable, PF 10/23/2019    MMR 04/10/1990, 04/26/2000    Polio, Unspecified 1988, 1988, 04/10/1990, 09/01/1993    Poliovirus, inactivated (IPV) 1988, 1988, 04/10/1990, 09/01/1993    TDAP Vaccine (Adacel) 03/22/2019    Td (Adult), Adsorbed 04/26/2000       No results found for: \"PAP\"    Recent Labs   Lab Test 01/16/24  1520 10/10/23  1504 07/11/23  1517 04/11/23  1619 06/14/22  1456 03/16/22  1417 12/14/21  1618 08/17/21  1452   A1C 9.9* 6.8* 7.2* 11.0*   < >  --    < > 6.9   * 133*  --  158*   < >  --   --  124*   HDL 43 45  --  34*   < >  --   --  42   TRIG 269* 213*  --  289*   < >  --   --  119   ALT  --   --   --  34  --   --   --  40   CR  --   --   --  0.89  --  0.85  --  0.75   GFRESTIMATED  --   --   --  >90  --  >90  --  >90   ALBUMIN  --   --   --  4.6  --   --   --  4.0   POTASSIUM  --   --   --  4.9  --  5.0  --  4.1   TSH  --   --   --   --   --   --   --  1.39    < > = values in this interval not displayed.           1/16/2024     2:24 PM 5/26/2023     1:11 PM   PHQ-2 ( 1999 Pfizer)   Q1: Little interest or pleasure in doing things 0 0   Q2: Feeling down, depressed or hopeless 0 0   PHQ-2 Score 0 0           6/14/2022     2:09 PM 10/4/2022     2:36 PM 10/4/2022     2:42 PM 4/16/2024     1:56 PM   PHQ-9 SCORE   PHQ-9 Total Score MyChart    4 (Minimal depression)   PHQ-9 Total Score 6 4 4 4           9/7/2021    11:28 AM 10/4/2022     2:42 PM 4/16/2024     1:56 PM   REBA-7 SCORE   Total Score   5 (mild anxiety) "   Total Score 2 3 5           4/16/2024     1:57 PM   ACT Total Scores   ACT TOTAL SCORE (Goal Greater than or Equal to 20) 21   In the past 12 months, how many times did you visit the emergency room for your asthma without being admitted to the hospital? 0   In the past 12 months, how many times were you hospitalized overnight because of your asthma? 0       Radha Raymond, EMT    April 16, 2024 2:07 PM

## 2024-04-16 NOTE — LETTER
April 17, 2024      Pedro READ Janiya Armin  [address]     [place] [address]        Dear Mr.Siegl Funez,    We are writing to inform you of your test results.    Test results indicate you may require additional follow up, see comment below.    Resulted Orders   Hemoglobin A1c   Result Value Ref Range    Hemoglobin A1C 8.5 (H) <5.7 %      Comment:      Normal <5.7%   Prediabetes 5.7-6.4%    Diabetes 6.5% or higher     Note: Adopted from ADA consensus guidelines.   Lipid panel reflex to direct LDL Fasting   Result Value Ref Range    Cholesterol 242 (H) <200 mg/dL    Triglycerides 309 (H) <150 mg/dL    Direct Measure HDL 47 >=40 mg/dL    LDL Cholesterol Calculated 133 (H) <=100 mg/dL    Non HDL Cholesterol 195 (H) <130 mg/dL    Patient Fasting > 8hrs? Unknown     Narrative    Cholesterol  Desirable:  <200 mg/dL    Triglycerides  Normal:  Less than 150 mg/dL  Borderline High:  150-199 mg/dL  High:  200-499 mg/dL  Very High:  Greater than or equal to 500 mg/dL    Direct Measure HDL  Female:  Greater than or equal to 50 mg/dL   Male:  Greater than or equal to 40 mg/dL    LDL Cholesterol  Desirable:  <100mg/dL  Above Desirable:  100-129 mg/dL   Borderline High:  130-159 mg/dL   High:  160-189 mg/dL   Very High:  >= 190 mg/dL    Non HDL Cholesterol  Desirable:  130 mg/dL  Above Desirable:  130-159 mg/dL  Borderline High:  160-189 mg/dL  High:  190-219 mg/dL  Very High:  Greater than or equal to 220 mg/dL   Jet Vasquez, here are your lab results. Your 3 month blood sugar average went down a little so that is good. It is 8.5. The goal is around 7. I think resuming your medications and exercising will help this.  Your cholesterol levels are very high. The triglycerides indicate you are likely eating too many high sugar foods like carbohydrates (breadsc, cereals, fast foods, fried foods, sweets and sweetened beverages) and too many fats. Your LDL (bad cholesterol is also high. Keep working on healthy diet.  We can  repeat in 3 months or think about statin therapy. Thank you    If you have any questions or concerns, please call the clinic at the number listed above.       Sincerely,      JUAN ALBERTO Moncada CNP

## 2024-04-16 NOTE — PROGRESS NOTES
"       HPI       Chief Complaint   Patient presents with    Diabetes    Asthma    Anxiety     Anxiety has been a little up     Pedro Funez is a friendly 33 year old with a history significant for DMT2, HTN, MO, MDD, REBA, GERD, CKD stage 1, and ASD presented to the clinic today for a diabetes follow up.    Diabetes  Patient reports that he has been feeling well and tolerating his medications. Over the last three months, the patient reports that he has been taking metformin and liraglutide less than half of the days. He says he does not remember to take his medication. In the past, he has used alarms on his phone to remind him, but he hasn't been doing that. He denies any side effects. He reports that he checks his blood sugar with his home glucometer once a week. His highest blood sugar in the last three months was 210 and he has been averaging about 150. When he checked his blood sugar at hour of sleep, it was 200. He does not have any specific complaints other than that he has been urinating more frequently, especially at night. The patient has a water bottle that holds a half-gallon and he inconsistently tries to drink a total of one gallon per day and wonders if that is why he is urinating more. Nocturia has disrupted his sleep a little. He reports cutting down on carbs and avoiding added sugars. Denies symptoms of hyper- and hypoglycemia. Patient drinks one sugar-free energy drink every day because it helps \"get him going\" in the morning.    Obesity  Patient reports eating more home-cooked meals and eating less fried foods. Patient has recently had access to more meats and has access to food pantries where he is able to bring bag lunches home. Patient enjoys walking outside and will do that when the weather is nice. Patient has gained a net of 10 lbs in the last 12 months.    HTN  Patient reports taking his medication. Does not check his BP at home. Uses chewing tobacco once a twice a day.    HLD  Working " "on improving diet and increasing exercise.    Anxiety  Patient reports experiencing increased stress over the last month related to loud, disruptive neighbors who scream and fight all night and have barking dogs. Patient has reported this multiple times and has been told that \"it's being handled.\" Patient reports that he is safe and that there is somewhere he could go if he felt unsafe. He has also considered moving to a different building (outside of the city). The stress has disrupted his sleep schedule and he is sleeping more than usual (about 9H a night and napping during the day). Patient has not been taking his hydroxyzine because he doesn't like to take those kinds of medications. He says that he is \"OK\" with his current level of anxiety. Patient reports that he is especially anxious today and might take hydroxyzine today.    Headache  Reports having migraines since he was in third grade when he experienced his first migraine with aura. Patient was never evaluated, diagnosed with, or treated for migraine. He describes his migraine as starting as a dull pain behind his eyes, usually unilateral, and a tight squeezing from his temple wrapping around to the back of his head. He experiences these migraines about once a week and they last 30 to 60 minutes. He experiences aura as sparkling in his field of vision and this occurs about once a month. Sometimes he has photophobia but sometimes does not. Sometimes he experiences tingling that spreads down one arm that resolves when the migraine attack ends. He does not experience nausea, vomiting, tearing of one eye, or other swelling of the face. He has taken Advil migraine which did not help at all. He reports that his body does not respond to ibuprofen. Patient finds drinking water water and gently stretching his neck helps abort the migraine. Patient's triggers are not drinking enough water and stress. Migraines occur when patient is active and when at rest. His " daily energy drink contains a lot of caffeine but the patient does not think it is related to his migraine.    Problem, Medication and Allergy Lists were reviewed and updated if needed..    Patient is an established patient of this clinic..         Review of Systems:   Review of Systems  GEN: Denies fever, chills, or unexplained weight changes.  SKIN: Denies any new rashes, lesions, or sores.  HEENT: Denies changes in vision. Has been about a year since he has seen an eye doctor. Does not remember the last time he saw a dentist. Denies any tooth pain.  NEURO: As per HPI. Denies dizziness, numbness, neuropathy, or weakness.  RESP: Denies cough, shortness of breath, or wheezing. Reports his asthma is well-controlled. He is using Dulera which is helping.  CV: Denies chest pain, palpitations, or fluttering heart beats.  Peripheral vascular: Denies swelling of legs and feet.  Heme: No new or unexplained bruising.  GI: Denies nausea, vomiting, abdominal pain, or reflux. Patient reports both looser stools and constipation. He has a bowel movement almost daily.  : Reports increase in urinary frequency.  MSK: Reports having issues with pinky toe folding under his foot.  Psych: As per HPI.    PATIENT HEALTH QUESTIONNAIRE-9 (PHQ - 9)    Over the last 2 weeks, how often have you been bothered by any of the following problems?    1. Little interest or pleasure in doing things -  Several days   2. Feeling down, depressed, or hopeless -  Not at all   3. Trouble falling or staying asleep, or sleeping too much - More than half the days   4. Feeling tired or having little energy -  Several days   5. Poor appetite or overeating -  Not at all   6. Feeling bad about yourself - or that you are a failure or have let yourself or your family down -  Not at all   7. Trouble concentrating on things, such as reading the newspaper or watching television - Not at all   8. Moving or speaking so slowly that other people could have noticed? Or  "the opposite - being so fidgety or restless that you have been moving around a lot more than usual Not at all   9. Thoughts that you would be better off dead or of hurting  yourself in some way Not at all   Total Score: 4     If you checked off any problems, how difficult have these problems made it for you to do your work, take care of things at home, or get along with other people?      Developed by Sierra Kumar, Marifer Padilla, Erasto Florez and colleagues, with an educational leana from [employer] No permission required to reproduce, translate, display or distribute. permission required to reproduce, translate, display or distribute.      9/7/2021    11:28 AM 10/4/2022     2:42 PM 4/16/2024     1:56 PM   REBA-7 SCORE   Total Score   5 (mild anxiety)   Total Score 2 3 5                Physical Exam:     Vitals:    04/16/24 1401 04/16/24 1406   BP: 138/86 135/89   BP Location: Left arm Left arm   Patient Position: Sitting Sitting   Cuff Size: Adult Regular Adult Regular   Pulse: (!) 125 (!) 121   Resp: 18    Temp: 98.2  F (36.8  C)    TempSrc: Oral    SpO2: 96%    Weight: 97.8 kg (215 lb 9.6 oz)    Height: 1.623 m (5' 3.9\")      Body mass index is 37.12 kg/m .  Weight is down 3 pounds from previous  Patient is tachycardic, mildly hypertensive, and overweight.     Physical Exam  GEN: Alert; disheveled and dirty clothing; strong body odor.  NEURO: EOM intact, normal gait.  HEENT: Eyes clear, red light reflex present, negative for papilledema. Oral mucosa intact. Visible black debris and plaque on teeth. No visible lesions.  THYROID: No nodules or masses. Neck supple.  RESP: Lungs clear bilaterally throughout; negative for adventitious sounds, crackles, wheezing, or rhonchi.  CV: Tachycardic, regular rhythm, no murmurs, gallops, or rubs. Normal S1, S2, no S3, S4.  Peripheral: Slight ankle edema,nonpitting  FOOT EXAM: Sensation intact to monofilament all surfaces. Calloused heels. MOOD: Pleasant, " engaged. Appropriate responses.Mild medial deviation right 5th toe      Results:   Results are ordered and pending    Assessment and Plan      1. Type 2 diabetes mellitus with hyperglycemia, without long-term current use of insulin (H)  - Checking labs: A1c (3-month average)  - You have been checking your blood sugar once a week  - Check your blood sugar twice a day for at least one week (once when you first wake up and once before you go to bed)  - Use your phone to set alarm reminders to take your medications  - PLEASE TAKE YOUR MEDICATIONS EVERY DAY!!!  - Victoza needs to be injected once a day  - Take 4 tablets of metformin once daily with dinner  - Continue making and eating home-cooked meals  - As the weather gets nicer, get outside and walk!  - Will check with the pharmacist to see if we can get you a continuous glucose monitor (CGM) --> [facility] will contact you  - Goal blood sugar 130 to 150  - Will notify of results    - Hemoglobin A1c; Future  - Adult Eye  Referral; Future  - Hemoglobin A1c    2. Hyperlipidemia LDL goal <100  Patient is at increased CV risk due to his comorbidities, inconsistently taking medications, and diet. Will check lipids and notify of results. Patient reported only drinking one energy drink today and had not eaten any food at the time his blood was drawn. Patient was able to teach back to the provider what constitutes a healthy diet. He was encouraged to continue working on his diet and walking outside for exercise.  - Lipid panel reflex to direct LDL Fasting; Future  - Lipid panel reflex to direct LDL Fasting    3. Benign essential hypertension  Goal 140/90. Discussed healthy diet, exercise, and weight management.  - lisinopril (ZESTRIL) 10 MG tablet; Take 1 tablet (10 mg) by mouth daily  Dispense: 90 tablet; Refill: 1    4. Need for dental care  Poor dentition and dental hygiene. Needs regular dental care. Referral placed.  - Dental Referral    5. Stress  - Practice  relaxation techniques such as mindfulness, deep breathing exercises, and progressive muscle relaxation  - Stop drinking energy drinks  - Eat and drink regularly  - Exercise - walk outside  - Protect your sleep and practice good sleep hygiene  - Hydroxyzine is safe to take and you can feel free to use that to help manage your anxiety    6. Headache  - Long-term history of migraine since 3rd grade. Headache sounds to be mixed given he reports tension in neck as well. Does report aura.  - Eat and drink regularly  - Use gentle stretches to relieve tension  - Stop drinking energy drinks for at least once a month and then return to the clinic for reevaluation of migraine  - Discussed sumatriptan  - May consider other treatment options if migraines continue or worsen  - If you experience a thunder clap headache, the worst headache of your life, or it wakens you from sleep, report to the ED     Medications Discontinued During This Encounter   Medication Reason    lisinopril (ZESTRIL) 10 MG tablet Reorder (No AVS)       Options for treatment and follow-up care were reviewed with the patient. Pedro Funez  engaged in the decision making process and verbalized understanding of the options discussed and agreed with the final plan.    I was present with the NPP student, Beth Raman RN, who participated in the service and in the documentation of the services provided. I have verified the history and personally performed the physical exam and medical decision making, as documented by the student and edited by me    JUAN ALBERTO Moncada CNP  04/16/24  5:28 PM    JUAN ALBERTO Moncada CNP    Answers submitted by the patient for this visit:  Patient Health Questionnaire (Submitted on 4/16/2024)  If you checked off any problems, how difficult have these problems made it for you to do your work, take care of things at home, or get along with other people?: Not difficult at all  PHQ9 TOTAL SCORE: 4  REBA-7  (Submitted on 4/16/2024)  REBA 7 TOTAL SCORE: 5

## 2024-04-16 NOTE — LETTER
April 17, 2024      Pedro Funez  [address] [place] [address]     [facility] [address]        Dear Mr.Siegl Funez,    We are writing to inform you of your test results.    Here is your A1c ( 3 month blood sugar average). It is still high but improved since your last one. I think if you get back on track with medications, that will really help. Continue with your good work on diet and weight loss. Thank you    Resulted Orders   Hemoglobin A1c   Result Value Ref Range    Hemoglobin A1C 8.5 (H) <5.7 %      Comment:      Normal <5.7%   Prediabetes 5.7-6.4%    Diabetes 6.5% or higher     Note: Adopted from ADA consensus guidelines.       If you have any questions or concerns, please call the clinic at the number listed above.       Sincerely,      JUAN ALBERTO Moncada CNP

## 2024-04-17 LAB
CHOLEST SERPL-MCNC: 242 MG/DL
FASTING STATUS PATIENT QL REPORTED: ABNORMAL
HDLC SERPL-MCNC: 47 MG/DL
LDLC SERPL CALC-MCNC: 133 MG/DL
NONHDLC SERPL-MCNC: 195 MG/DL
TRIGL SERPL-MCNC: 309 MG/DL

## 2024-04-19 NOTE — TELEPHONE ENCOUNTER
liraglutide (VICTOZA) 18 MG/3ML solution     Last Written Prescription Date:  10/10/23  Last Fill Quantity: 3 ml ,   # refills: 2  Last Office Visit : 4/16/24  Future Office visit:  5/14/24  GFR Estimate   Date Value Ref Range Status   04/11/2023 >90 >60 mL/min/1.73m2 Final     Comment:     eGFR calculated using 2021 CKD-EPI equation.      Routing refill request to provider for review/approval because:   GFR due per protocol( last 4/11/23)  Has upcoming appt 5/14/24 inperson. A1C, LIPIDS done 4/16/24

## 2024-04-22 DIAGNOSIS — R80.9 PROTEINURIA, UNSPECIFIED TYPE: ICD-10-CM

## 2024-04-22 DIAGNOSIS — E11.65 TYPE 2 DIABETES MELLITUS WITH HYPERGLYCEMIA, WITHOUT LONG-TERM CURRENT USE OF INSULIN (H): Primary | ICD-10-CM

## 2024-04-23 ENCOUNTER — TELEPHONE (OUTPATIENT)
Dept: FAMILY MEDICINE | Facility: CLINIC | Age: 34
End: 2024-04-23

## 2024-04-23 NOTE — TELEPHONE ENCOUNTER
I tried reaching out to [facility] to set up an lab appointment, but he didn't . I left him a voicemail.

## 2024-04-23 NOTE — TELEPHONE ENCOUNTER
----- Message from Mirian SukhJUAN ALBERTO Dumont CNP sent at 4/22/2024  8:27 PM CDT -----  Regarding: renal labs  Hi can you ask Pedro to come back in to have an additional lab test drawn? I put the future orders in. I also referred him to Nuria and she might have come back in as well. I asked her to let us know if she needs him to come in. Thanks

## 2024-04-25 ENCOUNTER — LAB (OUTPATIENT)
Dept: LAB | Facility: CLINIC | Age: 34
End: 2024-04-25
Payer: MEDICARE

## 2024-04-25 DIAGNOSIS — R80.9 PROTEINURIA, UNSPECIFIED TYPE: ICD-10-CM

## 2024-04-25 DIAGNOSIS — E11.65 TYPE 2 DIABETES MELLITUS WITH HYPERGLYCEMIA, WITHOUT LONG-TERM CURRENT USE OF INSULIN (H): ICD-10-CM

## 2024-04-25 LAB
CREAT UR-MCNC: 34.6 MG/DL
MICROALBUMIN UR-MCNC: 16.2 MG/L
MICROALBUMIN/CREAT UR: 46.82 MG/G CR (ref 0–17)

## 2024-04-25 PROCEDURE — 82043 UR ALBUMIN QUANTITATIVE: CPT | Mod: ORL | Performed by: NURSE PRACTITIONER

## 2024-04-25 PROCEDURE — 80048 BASIC METABOLIC PNL TOTAL CA: CPT | Mod: ORL | Performed by: NURSE PRACTITIONER

## 2024-04-26 LAB
ANION GAP SERPL CALCULATED.3IONS-SCNC: 13 MMOL/L (ref 7–15)
BUN SERPL-MCNC: 16.1 MG/DL (ref 6–20)
CALCIUM SERPL-MCNC: 10 MG/DL (ref 8.6–10)
CHLORIDE SERPL-SCNC: 98 MMOL/L (ref 98–107)
CREAT SERPL-MCNC: 0.95 MG/DL (ref 0.67–1.17)
DEPRECATED HCO3 PLAS-SCNC: 26 MMOL/L (ref 22–29)
EGFRCR SERPLBLD CKD-EPI 2021: >90 ML/MIN/1.73M2
GLUCOSE SERPL-MCNC: 376 MG/DL (ref 70–99)
POTASSIUM SERPL-SCNC: 4.4 MMOL/L (ref 3.4–5.3)
SODIUM SERPL-SCNC: 137 MMOL/L (ref 135–145)

## 2024-05-10 DIAGNOSIS — E11.65 TYPE 2 DIABETES MELLITUS WITH HYPERGLYCEMIA, WITHOUT LONG-TERM CURRENT USE OF INSULIN (H): ICD-10-CM

## 2024-05-14 ENCOUNTER — OFFICE VISIT (OUTPATIENT)
Dept: FAMILY MEDICINE | Facility: CLINIC | Age: 34
End: 2024-05-14
Payer: MEDICARE

## 2024-05-14 VITALS
WEIGHT: 209.5 LBS | HEART RATE: 119 BPM | RESPIRATION RATE: 18 BRPM | TEMPERATURE: 98.4 F | HEIGHT: 64 IN | BODY MASS INDEX: 35.77 KG/M2 | OXYGEN SATURATION: 94 % | SYSTOLIC BLOOD PRESSURE: 132 MMHG | DIASTOLIC BLOOD PRESSURE: 83 MMHG

## 2024-05-14 DIAGNOSIS — F43.9 STRESS: ICD-10-CM

## 2024-05-14 DIAGNOSIS — I10 BENIGN ESSENTIAL HYPERTENSION: ICD-10-CM

## 2024-05-14 DIAGNOSIS — K21.9 GASTROESOPHAGEAL REFLUX DISEASE WITHOUT ESOPHAGITIS: ICD-10-CM

## 2024-05-14 DIAGNOSIS — E11.65 TYPE 2 DIABETES MELLITUS WITH HYPERGLYCEMIA, WITHOUT LONG-TERM CURRENT USE OF INSULIN (H): Primary | ICD-10-CM

## 2024-05-14 RX ORDER — PROCHLORPERAZINE 25 MG/1
SUPPOSITORY RECTAL
Qty: 1 EACH | Refills: 0 | Status: SHIPPED | OUTPATIENT
Start: 2024-05-14 | End: 2024-05-30 | Stop reason: ALTCHOICE

## 2024-05-14 ASSESSMENT — PAIN SCALES - GENERAL: PAINLEVEL: NO PAIN (0)

## 2024-05-14 NOTE — OUTPATIENT NURSE NOTE
Has housing, but current stressors in home include noisy neighbors who impact his sleep, outside individuals who collect in front of his apartment at all hours and engage in concerning activities.

## 2024-05-14 NOTE — NURSING NOTE
"ROOM:2  MYRNA MARTINEZ    Preferred Name: Pedro     How did you hear about us?  Current Patient     Services Provided? No    33 year old  Chief Complaint   Patient presents with    Follow Up     Blood Pressure        Blood pressure 132/83, pulse 119, temperature 98.4  F (36.9  C), temperature source Oral, resp. rate 18, height 1.631 m (5' 4.2\"), weight 95 kg (209 lb 8 oz), SpO2 94%. Body mass index is 35.74 kg/m .  BP completed using cuff size:        Patient Active Problem List   Diagnosis    Diabetes mellitus, type 2 (H)    Morbid obesity (H)    MDD (major depressive disorder), recurrent episode, moderate (H)    Intertrigo    GERD (gastroesophageal reflux disease)    REBA (generalized anxiety disorder)    Essential hypertension    Autism spectrum disorder    Chronic kidney disease, stage 1    Wheezing       Wt Readings from Last 2 Encounters:   05/14/24 95 kg (209 lb 8 oz)   04/16/24 97.8 kg (215 lb 9.6 oz)     BP Readings from Last 3 Encounters:   05/14/24 132/83   04/16/24 (!) 140/89   01/16/24 (!) 135/93       No Known Allergies    Current Outpatient Medications   Medication Sig Dispense Refill    blood glucose (NO BRAND SPECIFIED) lancets standard Use to test blood sugar one times daily or as directed. 100 lancet. 1    blood glucose (NO BRAND SPECIFIED) test strip Use to test blood sugar one times daily or as directed. 200 strip 4    blood glucose monitoring (ACCU-CHEK FERMIN PLUS) meter device kit Use to test blood sugars one time daily or as directed. 1 kit 0    blood glucose monitoring (ACCU-CHEK MULTICLIX) lancets Use to test blood sugar 1 times daily or as directed. 102 each 1    Blood Pressure Monitoring (BLOOD PRESSURE CUFF) MISC 1 Units daily 1 each 0    buPROPion (WELLBUTRIN XL) 150 MG 24 hr tablet Take 1 tablet (150 mg) by mouth every morning 90 tablet 1    fluticasone (FLONASE) 50 MCG/ACT nasal spray Spray 1-2 sprays into both nostrils daily 11.1 mL 2    hydrOXYzine HCl (ATARAX) 25 " MG tablet TAKE 1 TABLET (25 MG) BY MOUTH 3 TIMES DAILY AS NEEDED FOR ANXIETY 90 tablet 0    insulin pen needle (32G X 4 MM) 32G X 4 MM miscellaneous Use 1 pen needles daily or as directed. 30 each 11    liraglutide (VICTOZA) 18 MG/3ML solution Inject 1.2 mg Subcutaneous daily 3 mL 2    lisinopril (ZESTRIL) 10 MG tablet Take 1 tablet (10 mg) by mouth daily 90 tablet 1    loratadine (CLARITIN) 10 MG tablet Take 1 tablet (10 mg) by mouth daily 90 tablet 1    metFORMIN (GLUCOPHAGE XR) 500 MG 24 hr tablet Take 4 tablets (2,000 mg) by mouth daily (with dinner) 120 tablet 2    mometasone-formoterol (DULERA) 100-5 MCG/ACT inhaler Inhale 2 puffs into the lungs 2 times daily 13 g 2    Skin Protectants, Misc. (EUCERIN) cream Apply topically 2 times daily as needed for dry skin 240 g 1     No current facility-administered medications for this visit.       Social History     Tobacco Use    Smoking status: Former     Current packs/day: 0.00     Types: Cigarettes     Quit date: 2016     Years since quittin.3     Passive exposure: Never    Smokeless tobacco: Current     Types: Chew    Tobacco comments:     Chewing tobacco    Vaping Use    Vaping status: Every Day    Substances: Nicotine    Passive vaping exposure: Yes   Substance Use Topics    Alcohol use: Yes     Comment: 1 beer periodically. Has binged in the past    Drug use: No       Honoring Choices - Health Care Directive Guide offered to patient at time of visit.    Health Maintenance Due   Topic Date Due    ASTHMA ACTION PLAN  Never done    ADVANCE CARE PLANNING  Never done    DEPRESSION ACTION PLAN  Never done    Pneumococcal Vaccine: Pediatrics (0 to 5 Years) and At-Risk Patients (6 to 64 Years) (1 of 2 - PCV) Never done    HEPATITIS A IMMUNIZATION (1 of 2 - Risk 2-dose series) Never done    HEPATITIS B IMMUNIZATION (1 of 3 - 19+ 3-dose series) Never done    MEDICARE ANNUAL WELLNESS VISIT  2022    COVID-19 Vaccine (3 - 2023- season) 2023    DIABETIC FOOT  "EXAM  04/11/2024    EYE EXAM  05/26/2024       Immunization History   Administered Date(s) Administered    COVID-19 Monovalent 12+ (Pfizer 2022) 01/25/2022    COVID-19 Vaccine (Miah) 05/08/2021    Historical DTP/aP 1988, 1988, 02/01/1989, 04/10/1990, 09/01/1993    Influenza Vaccine >6 months,quad, PF 09/25/2016, 10/23/2019, 10/04/2022, 10/10/2023    Influenza, seasonal, injectable, PF 10/23/2019    MMR 04/10/1990, 04/26/2000    Polio, Unspecified 1988, 1988, 04/10/1990, 09/01/1993    Poliovirus, inactivated (IPV) 1988, 1988, 04/10/1990, 09/01/1993    TDAP Vaccine (Adacel) 03/22/2019    Td (Adult), Adsorbed 04/26/2000       No results found for: \"PAP\"    Recent Labs   Lab Test 04/25/24  1424 04/16/24  1535 01/16/24  1520 10/10/23  1504 07/11/23  1517 04/11/23  1619 12/14/21  1618 08/17/21  1452   A1C  --  8.5* 9.9* 6.8*   < > 11.0*   < > 6.9   LDL  --  133* 189* 133*  --  158*   < > 124*   HDL  --  47 43 45  --  34*   < > 42   TRIG  --  309* 269* 213*  --  289*   < > 119   ALT  --   --   --   --   --  34  --  40   CR 0.95  --   --   --   --  0.89   < > 0.75   GFRESTIMATED >90  --   --   --   --  >90   < > >90   ALBUMIN  --   --   --   --   --  4.6  --  4.0   POTASSIUM 4.4  --   --   --   --  4.9   < > 4.1   TSH  --   --   --   --   --   --   --  1.39    < > = values in this interval not displayed.           5/14/2024     2:18 PM 1/16/2024     2:24 PM   PHQ-2 ( 1999 Pfizer)   Q1: Little interest or pleasure in doing things 1 0   Q2: Feeling down, depressed or hopeless 1 0   PHQ-2 Score 2 0   Q1: Little interest or pleasure in doing things Several days    Q2: Feeling down, depressed or hopeless Several days    PHQ-2 Score 2            6/14/2022     2:09 PM 10/4/2022     2:36 PM 10/4/2022     2:42 PM 4/16/2024     1:56 PM   PHQ-9 SCORE   PHQ-9 Total Score MyChart    4 (Minimal depression)   PHQ-9 Total Score 6 4 4 4           9/7/2021    11:28 AM 10/4/2022     2:42 PM 4/16/2024     " 1:56 PM   REBA-7 SCORE   Total Score   5 (mild anxiety)   Total Score 2 3 5           4/16/2024     1:57 PM   ACT Total Scores   ACT TOTAL SCORE (Goal Greater than or Equal to 20) 21   In the past 12 months, how many times did you visit the emergency room for your asthma without being admitted to the hospital? 0   In the past 12 months, how many times were you hospitalized overnight because of your asthma? 0       Chase Yap    May 14, 2024 2:40 PM

## 2024-05-14 NOTE — PATIENT INSTRUCTIONS
Diabetes  Continue meds> set timer so remember to take them  Check blood sugar daily next few weeks  Did order continuous glucose monitor today  Follow with Jany Quiñones D to discuss meds, continuous BS  GOAL BS: 130-150    Reflux  Continue with antacid as needed, but if needing every day  Monitor cough, weight  Diary of food intake and symptoms  Limit your portion size, intake. Avoid foods that worsen  Return to clinic if worsening    Blood pressure is okay today  132/83. Goal 100-130/70-80s  Continue current medications    Stress and insomnia  Follow with mental health  Consider changing residence to reduce stress  When awakening at night, check BS

## 2024-05-14 NOTE — PROGRESS NOTES
HPI       Pedro Funez is a 33 year old  who presents for   Chief Complaint   Patient presents with    Follow Up     Blood Pressure        33 year-old male with history obesity, uncontrolled DM type 2 presents to clinic for follow up of BP and HTN. Has not felt motivated to monitor BS lately due to stressors in living situation: other tenants are noisy disrupting sleep. Has reported concern to authorities in building but nothing is changing. He also notes individuals lingering outside of building and has suspicion drugs are being sold. Has reported to police but not action. Went to therapy yesterday which was helpful. Has not checked BS but better at taking medications. Has awakened in the night recently, felt a little sweaty but thinks that was due to heat in apartment. Denies that he has experienced low blood sugar but then later states he thinks maybe in the morning it is low when he wakes up, but he has not checked it. Has tried for CGM in the past but was not covered.We have also considered weekly injections with GLP-1 but for now is on liraglutide and metformin.   Has not checked BP but reports taking medications.   Symptom review negative for CP, headache, irregular heart beat, but reports occasionally feeling foggy and lightheaded.    Has had JOSE MANUEL recently and using OTC antacid as needed. Uses rarely 1- 2 times per week. Developed dry cough past few days. Thinks might be due to stomach acid. Notices this more when he has not eaten. Denies abdominal pain currently.     Problem, Medication and Allergy Lists were reviewed and updated if needed..    Patient is an established patient of this clinic..         Review of Systems:   Review of Systems  GEN: Trying to lose weight by reducing portion size and eating better foods. Last labs show markedly elevated TG and elevated LDL. He is trying to eat better.   RESP: dry cough, denies SOB  CV: Denies CP  ABD: as per  HPI. Denies nausea, vomiting or  "abdominal pain. Stool pattern: once every other day to every 3 days. Usually firm, formed, not small. Denies bloating. No abdominal pain currently, just sensation of acid and burping.   ENDO: not checking BS. Potential episodes hypoglycemia; history inconsistent. No increase in thirst or urination.   NEURO: occasional fogginess, light headedness.  MOOD: increased stress due to living situation. Friends are supportive. Seeing counselor again.        Physical Exam:     Vitals:    05/14/24 1435   BP: 132/83   BP Location: Left arm   Patient Position: Standing   Cuff Size: Adult Regular   Pulse: 119   Resp: 18   Temp: 98.4  F (36.9  C)   TempSrc: Oral   SpO2: 94%   Weight: 95 kg (209 lb 8 oz)   Height: 1.631 m (5' 4.2\")     Body mass index is 35.74 kg/m .  Vital signs normal except BP borderline but improved from previous.     Physical Exam  GEN: alert male, talkative in NAD. Clean, not disheveled.  EYES: clear  OP: poor dentition. Oral mucosa pink, moist  RESP: Lungs CTA with air entry throughout  CV: HRRR, s1, S2, no MRG, negative peripheral edema  ABD: soft, rounded with BSx4, no HSM; slight tenderness to palpation in epigastrum.   NEURO: appropriate in response  MOOD: intact        Results:   Results from last visit:  Lab on 04/25/2024   Component Date Value Ref Range Status    Sodium 04/25/2024 137  135 - 145 mmol/L Final    Reference intervals for this test were updated on 09/26/2023 to more accurately reflect our healthy population. There may be differences in the flagging of prior results with similar values performed with this method. Interpretation of those prior results can be made in the context of the updated reference intervals.     Potassium 04/25/2024 4.4  3.4 - 5.3 mmol/L Final    Chloride 04/25/2024 98  98 - 107 mmol/L Final    Carbon Dioxide (CO2) 04/25/2024 26  22 - 29 mmol/L Final    Anion Gap 04/25/2024 13  7 - 15 mmol/L Final    Urea Nitrogen 04/25/2024 16.1  6.0 - 20.0 mg/dL Final    Creatinine " 04/25/2024 0.95  0.67 - 1.17 mg/dL Final    GFR Estimate 04/25/2024 >90  >60 mL/min/1.73m2 Final    Calcium 04/25/2024 10.0  8.6 - 10.0 mg/dL Final    Glucose 04/25/2024 376 (H)  70 - 99 mg/dL Final    Creatinine Urine mg/dL 04/25/2024 34.6  mg/dL Final    The reference ranges have not been established in urine creatinine. The results should be integrated into the clinical context for interpretation.    Albumin Urine mg/L 04/25/2024 16.2  mg/L Final    The reference ranges have not been established in urine albumin. The results should be integrated into the clinical context for interpretation.    Albumin Urine mg/g Cr 04/25/2024 46.82 (H)  0.00 - 17.00 mg/g Cr Final    Microalbuminuria is defined as an albumin:creatinine ratio of 17 to 299 for males and 25 to 299 for females. A ratio of albumin:creatinine of 300 or higher is indicative of overt proteinuria.  Due to biologic variability, positive results should be confirmed by a second, first-morning random or 24-hour timed urine specimen. If there is discrepancy, a third specimen is recommended. When 2 out of 3 results are in the microalbuminuria range, this is evidence for incipient nephropathy and warrants increased efforts at glucose control, blood pressure control, and institution of therapy with an angiotensin-converting-enzyme (ACE) inhibitor (if the patient can tolerate it).       Attempted to do BS but staff indicated they could not due to need to calibrate which would take 45  minutes.   Assessment and Plan        1. Type 2 diabetes mellitus with hyperglycemia, without long-term current use of insulin (H)  Risk for hypoglycemia but patient not checking BS. Rising A1c. Try for CGM again to enhance control. To follow up with PharmD  - Continuous Glucose  (DEXCOM G6 ) BIGG; Use to read blood sugars as per 's instructions.  Dispense: 1 each; Refill: 0    2. Gastroesophageal reflux without esophagitis  Discussed treating with  famotidine but patient declines - would like to continue with OTC antacid as he is using infrequently and dietary changes  Recommend journal to document triggers, identify patterns  Seek care if worsening. Most recent TG high and on GLP-1 so concern for pancreatitis. To monitor carefully, return if symptoms worsen. Symptoms mild today. Follow up 2 weeks to 1 month.     2. Benign essential hypertension  BP OK today. Continue current losartan. Reinforce dietary changes and weight loss.     3. Stress  Discussed current stressors. Consider changing residence. He could check with his housing as there are other locations he could live, but worries about those as well. Reinforced continuing with counseling.          There are no discontinued medications.    Options for treatment and follow-up care were reviewed with the patient. Pedro Funez  engaged in the decision making process and verbalized understanding of the options discussed and agreed with the final plan.    JUAN ALBERTO Moncada CNP

## 2024-05-15 RX ORDER — BLOOD SUGAR DIAGNOSTIC
STRIP MISCELLANEOUS
Qty: 200 STRIP | Refills: 4 | Status: SHIPPED | OUTPATIENT
Start: 2024-05-15

## 2024-05-15 NOTE — TELEPHONE ENCOUNTER
LVD:  4/16/24 NP Clinic     Mirian Lo, JUAN ALBERTO CNP  Family Medicine     Refilled per protocol.

## 2024-05-21 NOTE — TELEPHONE ENCOUNTER
LVD:  4/16/2024  [facility]     Mirian Lo, JUAN ALBERTO CNP  Family Medicine     Refilled per protocol.

## 2024-05-30 ENCOUNTER — OFFICE VISIT (OUTPATIENT)
Dept: PHARMACY | Facility: CLINIC | Age: 34
End: 2024-05-30
Payer: MEDICARE

## 2024-05-30 DIAGNOSIS — E11.9 DIABETES MELLITUS, TYPE 2 (H): Primary | ICD-10-CM

## 2024-05-30 RX ORDER — BLOOD-GLUCOSE SENSOR
1 EACH MISCELLANEOUS
Qty: 6 EACH | Refills: 5 | Status: SHIPPED | OUTPATIENT
Start: 2024-05-30

## 2024-05-30 RX ORDER — LIRAGLUTIDE 6 MG/ML
1.2 INJECTION SUBCUTANEOUS DAILY
Qty: 3 ML | Refills: 2 | Status: SHIPPED | OUTPATIENT
Start: 2024-05-30

## 2024-05-30 RX ORDER — KETOROLAC TROMETHAMINE 30 MG/ML
1 INJECTION, SOLUTION INTRAMUSCULAR; INTRAVENOUS ONCE
Qty: 1 EACH | Refills: 0 | Status: SHIPPED | OUTPATIENT
Start: 2024-05-30 | End: 2024-05-30

## 2024-05-30 RX ORDER — ACYCLOVIR 400 MG/1
TABLET ORAL
Qty: 3 EACH | Refills: 5 | Status: SHIPPED | OUTPATIENT
Start: 2024-05-30 | End: 2024-05-30 | Stop reason: ALTCHOICE

## 2024-05-30 NOTE — Clinical Note
Jet Vela - Terrell PRADO this is my note from meeting with Pedro last week.  I discussed once weekly GLP-1s, but he actually noted that the daily GLP-1 will be easier for him to remember and so we stuck with that. I helped him to contact his pharmacy and we are going to try to pursue a Freestyle Ying instead of a Dexcom (I'm not sure that the freestyle will be covered, anyway). Although it's best to see if he can take Victoza with more consistency, we could consider increasing the dose of Victoza up to the next (and max) dose of 1.8 mg daily. I'll meet with him again in 5 weeks. - Nuria

## 2024-05-30 NOTE — PROGRESS NOTES
SUBJECTIVE: Pedro Funez is a 33 year old male who was referred by Mirian Lo for [facility] services for T2DM follow-up.    Diabetes: Currently taking metformin and Victoza to manage diabetes with some variation in adherence. He has had some adversities that he was working on these past few months that has affected how he takes his medications and as well as his general health, but he reports that it has gotten somewhat better.  Better at taking oral tablets and improving adherence to Victoza for at least 3-4 days out of the week that he administers it. He has had some challenges getting insurance coverage for his Dexcom so this was not started. Not taking home glucose readings at this time. Last A1c in April was 8.5%. Improving fitness by going on walks and trying to cook more meals at home. He tells us that he could use a hand getting refills and a plan going for a sensor that will continuously take his glucose readings.    Environmental factors that impact patient: Living situation is adding stress and interrupting sleep pattern.      OBJECTIVE:    Patient Active Problem List   Diagnosis    Diabetes mellitus, type 2 (H)    Morbid obesity (H)    MDD (major depressive disorder), recurrent episode, moderate (H)    Intertrigo    GERD (gastroesophageal reflux disease)    REBA (generalized anxiety disorder)    Essential hypertension    Autism spectrum disorder    Chronic kidney disease, stage 1    Wheezing        Current Outpatient Medications   Medication Sig Dispense Refill    ACCU-CHEK GUIDE test strip USE TO TEST BLOOD SUGAR ONE TIME DAILY OR AS DIRECTED. 200 strip 4    blood glucose (NO BRAND SPECIFIED) lancets standard Use to test blood sugar one times daily or as directed. 100 Lancet 1    blood glucose monitoring (ACCU-CHEK FERMIN PLUS) meter device kit Use to test blood sugars one time daily or as directed. 1 kit 0    blood glucose monitoring (ACCU-CHEK MULTICLIX) lancets Use to test blood sugar 1  times daily or as directed. 102 each 1    Blood Pressure Monitoring (BLOOD PRESSURE CUFF) MISC 1 Units daily 1 each 0    buPROPion (WELLBUTRIN XL) 150 MG 24 hr tablet Take 1 tablet (150 mg) by mouth every morning 90 tablet 1    Continuous Glucose  (DEXCOM G6 ) BIGG Use to read blood sugars as per 's instructions. 1 each 0    fluticasone (FLONASE) 50 MCG/ACT nasal spray Spray 1-2 sprays into both nostrils daily 11.1 mL 2    hydrOXYzine HCl (ATARAX) 25 MG tablet TAKE 1 TABLET (25 MG) BY MOUTH 3 TIMES DAILY AS NEEDED FOR ANXIETY 90 tablet 0    insulin pen needle (32G X 4 MM) 32G X 4 MM miscellaneous Use 1 pen needles daily or as directed. 30 each 11    liraglutide (VICTOZA) 18 MG/3ML solution Inject 1.2 mg Subcutaneous daily 3 mL 2    lisinopril (ZESTRIL) 10 MG tablet Take 1 tablet (10 mg) by mouth daily 90 tablet 1    loratadine (CLARITIN) 10 MG tablet Take 1 tablet (10 mg) by mouth daily 90 tablet 1    metFORMIN (GLUCOPHAGE XR) 500 MG 24 hr tablet Take 4 tablets (2,000 mg) by mouth daily (with dinner) 120 tablet 2    mometasone-formoterol (DULERA) 100-5 MCG/ACT inhaler Inhale 2 puffs into the lungs 2 times daily 13 g 2    Skin Protectants, Misc. (EUCERIN) cream Apply topically 2 times daily as needed for dry skin 240 g 1       ASSESSMENT:    Diabetes: Not at goal for an A1c of less than 7% and a fasting blood glucose of  for adequate diabetes management. Because he doesn't test his sugars at home and doesn't have a CGM yet, will be best to start one to better assess patterns and glycemic control. He would prefer a CGM that comes with its own reader, and we agreed that something like the Freestyle Ying 3 may be a better option at this time. We got in touch with the [facility] pharmacy who will follow-up with Pedro going forward to see if these will be covered. We did explore the idea of a once weekly GLP-1, but since Pedro mentions that this may cause his adherence to worsen due to  forgetting if it's once weekly, it may be better to just stick with Victoza at this time and possibly increasing the dose to 1.8 mg/day if needed since he didn't have any adverse drug events to report with it. As for refills, we called the [facility] pharmacy and they will set it up so that he can get them after June 3rd for when he gets his finances covered.    Medication therapy problem: Adherence (Victoza)  Medication therapy problem: Needs additional monitoring     PLAN:    Refills for his prescriptions sent to [facility] pharmacy  [facility] specialty pharmacy to follow-up on Freestyle Ying 3 coverage  Follow-up in 6 weeks for A1c check and to reassess current T2DM regimen    Options for treatment and/or follow-up care were reviewed with the patient. Pedro Funez was engaged and actively involved in the decision making process. He/She verbalized understanding of the options discussed and was satisfied with the final plan.     Patient was provided with written instructions/medication list via AVS.     BILLING:     Medical conditions reviewed: 1     Medications reviewed: 2     MTP identified: 2     Time spent: 30 minutes     Level of service: , nc

## 2024-06-03 ENCOUNTER — TELEPHONE (OUTPATIENT)
Dept: FAMILY MEDICINE | Facility: CLINIC | Age: 34
End: 2024-06-03

## 2024-06-03 DIAGNOSIS — J30.89 NON-SEASONAL ALLERGIC RHINITIS, UNSPECIFIED TRIGGER: ICD-10-CM

## 2024-06-03 DIAGNOSIS — J45.30 MILD PERSISTENT ASTHMA WITHOUT COMPLICATION: ICD-10-CM

## 2024-06-03 NOTE — TELEPHONE ENCOUNTER
We are currently unable to fill Freestyle Ying 3 for the patient due to patient not meeting Medicare Guidelines. In order to meet guidelines we need the following:    VISIT NOTE REQUIREMENTS: (must state the following)       Patient must be seen/clinical notes must be written in the last 6 months.    Must state that the patient is using daily insulin (Can be written that patient is injecting with insulin pump) We cannot accept medication list as insulin regimen.     Must state that the patient is a type 1 or type 2 diabetic.      Thank you!    Diabetes Care Services Pharmacy Team  [facility]  Phone: [phone]  Fax: [phone]  Pool: Pharm Diabetes

## 2024-06-04 NOTE — TELEPHONE ENCOUNTER
Jet Vela,  I think Medical assistance covers this much better than medicare.  I have run into medicare's issue with needing insulin on board for a CGM.  I'm not sure how to get past this right now.

## 2024-06-11 RX ORDER — FLUTICASONE PROPIONATE 50 MCG
1-2 SPRAY, SUSPENSION (ML) NASAL DAILY
Qty: 11.1 ML | Refills: 2 | Status: SHIPPED | OUTPATIENT
Start: 2024-06-11

## 2024-06-12 NOTE — TELEPHONE ENCOUNTER
LVD:  /14/2024  [facility]     Mirian Lo, JUAN ALBERTO CNP  Family Medicine     Refilled per protocol.

## 2024-06-18 ENCOUNTER — OFFICE VISIT (OUTPATIENT)
Dept: FAMILY MEDICINE | Facility: CLINIC | Age: 34
End: 2024-06-18
Payer: MEDICARE

## 2024-06-18 VITALS
DIASTOLIC BLOOD PRESSURE: 88 MMHG | OXYGEN SATURATION: 95 % | TEMPERATURE: 97.6 F | WEIGHT: 207.8 LBS | RESPIRATION RATE: 17 BRPM | HEART RATE: 106 BPM | SYSTOLIC BLOOD PRESSURE: 133 MMHG | HEIGHT: 65 IN | BODY MASS INDEX: 34.62 KG/M2

## 2024-06-18 DIAGNOSIS — R21 RASH: ICD-10-CM

## 2024-06-18 DIAGNOSIS — E11.65 TYPE 2 DIABETES MELLITUS WITH HYPERGLYCEMIA, WITHOUT LONG-TERM CURRENT USE OF INSULIN (H): Primary | ICD-10-CM

## 2024-06-18 RX ORDER — PERMETHRIN 50 MG/G
CREAM TOPICAL
Qty: 60 G | Refills: 1 | Status: SHIPPED | OUTPATIENT
Start: 2024-06-18

## 2024-06-18 ASSESSMENT — ASTHMA QUESTIONNAIRES
ACT_TOTALSCORE: 23
QUESTION_3 LAST FOUR WEEKS HOW OFTEN DID YOUR ASTHMA SYMPTOMS (WHEEZING, COUGHING, SHORTNESS OF BREATH, CHEST TIGHTNESS OR PAIN) WAKE YOU UP AT NIGHT OR EARLIER THAN USUAL IN THE MORNING: NOT AT ALL
QUESTION_4 LAST FOUR WEEKS HOW OFTEN HAVE YOU USED YOUR RESCUE INHALER OR NEBULIZER MEDICATION (SUCH AS ALBUTEROL): ONCE A WEEK OR LESS
QUESTION_2 LAST FOUR WEEKS HOW OFTEN HAVE YOU HAD SHORTNESS OF BREATH: NOT AT ALL
QUESTION_1 LAST FOUR WEEKS HOW MUCH OF THE TIME DID YOUR ASTHMA KEEP YOU FROM GETTING AS MUCH DONE AT WORK, SCHOOL OR AT HOME: NONE OF THE TIME
QUESTION_5 LAST FOUR WEEKS HOW WOULD YOU RATE YOUR ASTHMA CONTROL: WELL CONTROLLED
ACT_TOTALSCORE: 23

## 2024-06-18 ASSESSMENT — PAIN SCALES - GENERAL: PAINLEVEL: NO PAIN (0)

## 2024-06-18 NOTE — PATIENT INSTRUCTIONS
Rash  Apply Elimite cream from neck to toes over night for 12 -14 hours one time. Rinse off, wash all sheets and clothing.    Return if not improving    Diabetes Type 2  Work on regularly taking medication. Your alarm is set so follow it when it goes off  Referred to endocrine. Could consider basal insulin again to reestablish control  Keep working on diet and exercise  Healthy diet, regular meals with lean meats, fresh vegetable, reduce carbohydrates    Hypertension  Continue with current medications. Goal is 130/80.   Low salt diet

## 2024-06-18 NOTE — NURSING NOTE
"ROOM:1  MYRNA MARTINEZ    Preferred Name: Pedro     How did you hear about us?  Current Patient     Services Provided? No    33 year old  Chief Complaint   Patient presents with    Hypertension       Blood pressure 133/88, pulse 106, temperature 97.6  F (36.4  C), temperature source Oral, resp. rate 17, height 1.651 m (5' 5\"), weight 94.3 kg (207 lb 12.8 oz), SpO2 95%. Body mass index is 34.58 kg/m .  BP completed using cuff size:        Patient Active Problem List   Diagnosis    Diabetes mellitus, type 2 (H)    Morbid obesity (H)    MDD (major depressive disorder), recurrent episode, moderate (H)    Intertrigo    GERD (gastroesophageal reflux disease)    REBA (generalized anxiety disorder)    Essential hypertension    Autism spectrum disorder    Chronic kidney disease, stage 1    Wheezing       Wt Readings from Last 2 Encounters:   06/18/24 94.3 kg (207 lb 12.8 oz)   05/14/24 95 kg (209 lb 8 oz)     BP Readings from Last 3 Encounters:   06/18/24 133/88   05/14/24 132/83   04/16/24 (!) 140/89       No Known Allergies    Current Outpatient Medications   Medication Sig Dispense Refill    ACCU-CHEK GUIDE test strip USE TO TEST BLOOD SUGAR ONE TIME DAILY OR AS DIRECTED. 200 strip 4    blood glucose (NO BRAND SPECIFIED) lancets standard Use to test blood sugar one times daily or as directed. 100 Lancet 1    blood glucose monitoring (ACCU-CHEK FERMIN PLUS) meter device kit Use to test blood sugars one time daily or as directed. 1 kit 0    blood glucose monitoring (ACCU-CHEK MULTICLIX) lancets Use to test blood sugar 1 times daily or as directed. 102 each 1    Blood Pressure Monitoring (BLOOD PRESSURE CUFF) MISC 1 Units daily 1 each 0    buPROPion (WELLBUTRIN XL) 150 MG 24 hr tablet Take 1 tablet (150 mg) by mouth every morning 90 tablet 1    Continuous Glucose Sensor (FREESTYLE FATOU 3 SENSOR) MISC 1 each every 14 days Use 1 sensor every 14 days. Use to read blood sugars per 's instructions. 6 " each 5    fluticasone (FLONASE) 50 MCG/ACT nasal spray Spray 1-2 sprays into both nostrils daily 11.1 mL 2    hydrOXYzine HCl (ATARAX) 25 MG tablet TAKE 1 TABLET (25 MG) BY MOUTH 3 TIMES DAILY AS NEEDED FOR ANXIETY 90 tablet 0    insulin pen needle (32G X 4 MM) 32G X 4 MM miscellaneous Use 1 pen needles daily or as directed. 30 each 11    liraglutide (VICTOZA) 18 MG/3ML solution Inject 1.2 mg Subcutaneous daily 3 mL 2    lisinopril (ZESTRIL) 10 MG tablet Take 1 tablet (10 mg) by mouth daily 90 tablet 1    loratadine (CLARITIN) 10 MG tablet Take 1 tablet (10 mg) by mouth daily 90 tablet 1    metFORMIN (GLUCOPHAGE XR) 500 MG 24 hr tablet Take 4 tablets (2,000 mg) by mouth daily (with dinner) 120 tablet 2    mometasone-formoterol (DULERA) 100-5 MCG/ACT inhaler Inhale 2 puffs into the lungs 2 times daily 13 g 2    Skin Protectants, Misc. (EUCERIN) cream Apply topically 2 times daily as needed for dry skin 240 g 1     No current facility-administered medications for this visit.       Social History     Tobacco Use    Smoking status: Former     Current packs/day: 0.00     Types: Cigarettes     Quit date: 2016     Years since quittin.4     Passive exposure: Never    Smokeless tobacco: Current     Types: Chew    Tobacco comments:     Chewing tobacco    Vaping Use    Vaping status: Every Day    Substances: Nicotine    Passive vaping exposure: Yes   Substance Use Topics    Alcohol use: Yes     Comment: 1 beer periodically. Has binged in the past    Drug use: No       Honoring Choices - Health Care Directive Guide offered to patient at time of visit.    Health Maintenance Due   Topic Date Due    ASTHMA ACTION PLAN  Never done    ADVANCE CARE PLANNING  Never done    DEPRESSION ACTION PLAN  Never done    Pneumococcal Vaccine: Pediatrics (0 to 5 Years) and At-Risk Patients (6 to 64 Years) (1 of 2 - PCV) Never done    HEPATITIS A IMMUNIZATION (1 of 2 - Risk 2-dose series) Never done    HEPATITIS B IMMUNIZATION (1 of 3 - 19+  "3-dose series) Never done    MEDICARE ANNUAL WELLNESS VISIT  08/17/2022    COVID-19 Vaccine (3 - 2023-24 season) 09/01/2023    DIABETIC FOOT EXAM  04/11/2024    EYE EXAM  05/26/2024       Immunization History   Administered Date(s) Administered    COVID-19 Monovalent 12+ (Pfizer 2022) 01/25/2022    COVID-19 Vaccine (Miah) 05/08/2021    Historical DTP/aP 1988, 1988, 02/01/1989, 04/10/1990, 09/01/1993    Influenza Vaccine >6 months,quad, PF 09/25/2016, 10/23/2019, 10/04/2022, 10/10/2023    Influenza, seasonal, injectable, PF 10/23/2019    MMR 04/10/1990, 04/26/2000    Polio, Unspecified 1988, 1988, 04/10/1990, 09/01/1993    Poliovirus, inactivated (IPV) 1988, 1988, 04/10/1990, 09/01/1993    TDAP Vaccine (Adacel) 03/22/2019    Td (Adult), Adsorbed 04/26/2000       No results found for: \"PAP\"    Recent Labs   Lab Test 04/25/24  1424 04/16/24  1535 01/16/24  1520 10/10/23  1504 07/11/23  1517 04/11/23  1619 12/14/21  1618 08/17/21  1452   A1C  --  8.5* 9.9* 6.8*   < > 11.0*   < > 6.9   LDL  --  133* 189* 133*  --  158*   < > 124*   HDL  --  47 43 45  --  34*   < > 42   TRIG  --  309* 269* 213*  --  289*   < > 119   ALT  --   --   --   --   --  34  --  40   CR 0.95  --   --   --   --  0.89   < > 0.75   GFRESTIMATED >90  --   --   --   --  >90   < > >90   ALBUMIN  --   --   --   --   --  4.6  --  4.0   POTASSIUM 4.4  --   --   --   --  4.9   < > 4.1   TSH  --   --   --   --   --   --   --  1.39    < > = values in this interval not displayed.           6/18/2024     2:27 PM 5/14/2024     2:18 PM   PHQ-2 ( 1999 Pfizer)   Q1: Little interest or pleasure in doing things 0 1   Q2: Feeling down, depressed or hopeless 0 1   PHQ-2 Score 0 2   Q1: Little interest or pleasure in doing things Not at all Several days   Q2: Feeling down, depressed or hopeless Not at all Several days   PHQ-2 Score 0 2           6/14/2022     2:09 PM 10/4/2022     2:36 PM 10/4/2022     2:42 PM 4/16/2024     1:56 PM "   PHQ-9 SCORE   PHQ-9 Total Score MyChart    4 (Minimal depression)   PHQ-9 Total Score 6 4 4 4           9/7/2021    11:28 AM 10/4/2022     2:42 PM 4/16/2024     1:56 PM   REBA-7 SCORE   Total Score   5 (mild anxiety)   Total Score 2 3 5           4/16/2024     1:57 PM 6/18/2024     2:29 PM   ACT Total Scores   ACT TOTAL SCORE (Goal Greater than or Equal to 20) 21 23   In the past 12 months, how many times did you visit the emergency room for your asthma without being admitted to the hospital? 0 0   In the past 12 months, how many times were you hospitalized overnight because of your asthma? 0 0       Radha Raymond, EMT    June 18, 2024 2:35 PM

## 2024-06-18 NOTE — PROGRESS NOTES
HPI       Pedro Funez is a 33 year old  who presents for   Chief Complaint   Patient presents with    Hypertension       33 year-old male with history HLD, T2DM, HTN presents for follow up of HTN.  Has taken lisinopril a few times, but not taking regularly. Denies chest pain, irregular heart pains, headache or vision changes. Last eye exam 1 year ago and has appt this Thursday.   On last lab check in April, proteinuria was noted, although eGFR WNL    DM: Has not set alarms to take meds. Took liraglutide once in past few days.  Not checking BS. Feels like diet is better and trying to lose weight.     Social: roommate, Verito, moved out. He feels this might be a good change for him because he might focus better on his own health. Has been cooking  for himself. Aware of foods to choose and foods to avoid.     Limited access to washer and dryer. Still having issues with noise from neighbors. Does have worker in building and will try to meet with him.       Problem, Medication and Allergy Lists were reviewed and updated if needed..    Patient is an established patient of this clinic..         Review of Systems:   Review of Systems  GEN: weight loss gradual over past few months. Denies fever  CV: as above Not checking BP  RESP: Negative for SOB, cough  ENDO: as per HPI  NEURO: denies H/A, weakness, numbness or tingling  SKIN: Bites on legs past few weeks, some itch. Has not applied anything.  Occasional erythema along waistband but only if pants tight. Denies lesions on trunk or elsewhere. No pets. Has not seen bedbugs. Has not washed sheets in awhile because washer in building always full. Bed is broken with spring popping through. Does not think former roommate has lesions.   MOOD: OK, mixed re: roommate moving to [facility]         Physical Exam:     Vitals:    06/18/24 1433   BP: 133/88   BP Location: Left arm   Patient Position: Sitting   Cuff Size: Adult Regular   Pulse: 106   Resp: 17   Temp: 97.6  F  "(36.4  C)   TempSrc: Oral   SpO2: 95%   Weight: 94.3 kg (207 lb 12.8 oz)   Height: 1.651 m (5' 5\")     Body mass index is 34.58 kg/m .  Vital signs normal except BP just above normal but good for patient.     Physical Exam  GEN: alert unkempt male in NAD  EYES: Clear COLE. Discs crisp.   Lungs CTA with air entry throughout  CV: HRRR, S1, S2, no MRG. Pedal pulses +2 bilaterally. No edema.   FOOT Exam: CMS intact to monofilament. 10 areas. Dried scab left heal medial aspect; dry, Callouses on heels. Nails intact.   SKIN: Erythematous papules over legs, random distribution some larger papules up to 6mm in size. Some patterns, e.g annular.   Scratch marks evident. No bites in finger webs.  MOOD: appropiate. Thought he looked teary at one point, but denies.       Results:   No testing ordered today    Assessment and Plan        1. Type 2 diabetes mellitus with hyperglycemia, without long-term current use of insulin (H)  Discussed potential approaches to therapy. Could resume basal insulin to reestablish control. He will consider. Set phone alarm to remind to take meds.  Discussed referral to endocrinology to see if they might have something else to offer. Discussed weekly injection but he does not want this. Reinforced diet and exercise. He used to walk a lot and thinks this might occur after departure of roommate. Check feet nightly. F/U nightly, A1c due then. Trying to get CGM. Dexcom declined. See pharmD note.   - Adult Endocrinology  Referral; Future    2. Rash  Uncertain etiology. Reviewed environmental cleam and bathing, what to look for bed bugs. Will treat with Elimite. Consider fleas since just on legs, but he has no pets. He will purchase Aveeno to try to help with itching. Could try OTC  HC cream also but wants to avoid for now. Avoid scratching to prevent infection.  Follow up if worsening  - permethrin (ELIMITE) 5 % external cream; Apply to clean, dry hair and leave on overnight or for 8-14 hours " before washing off with water.  Dispense: 60 g; Refill: 1    BP OK today.  Continue with meds, diet and exercise.      There are no discontinued medications.    Options for treatment and follow-up care were reviewed with the patient. Pedro Funez  engaged in the decision making process and verbalized understanding of the options discussed and agreed with the final plan.    JUAN ALBERTO Moncada CNP

## 2024-06-20 ENCOUNTER — OFFICE VISIT (OUTPATIENT)
Dept: OPHTHALMOLOGY | Facility: CLINIC | Age: 34
End: 2024-06-20
Attending: NURSE PRACTITIONER
Payer: MEDICARE

## 2024-06-20 DIAGNOSIS — H52.223 REGULAR ASTIGMATISM OF BOTH EYES: ICD-10-CM

## 2024-06-20 DIAGNOSIS — E11.65 TYPE 2 DIABETES MELLITUS WITH HYPERGLYCEMIA, WITHOUT LONG-TERM CURRENT USE OF INSULIN (H): ICD-10-CM

## 2024-06-20 DIAGNOSIS — E11.9 TYPE 2 DIABETES MELLITUS WITHOUT COMPLICATION, WITHOUT LONG-TERM CURRENT USE OF INSULIN (H): Primary | ICD-10-CM

## 2024-06-20 PROCEDURE — 92015 DETERMINE REFRACTIVE STATE: CPT | Performed by: OPTOMETRIST

## 2024-06-20 PROCEDURE — 92014 COMPRE OPH EXAM EST PT 1/>: CPT | Performed by: OPTOMETRIST

## 2024-06-20 ASSESSMENT — CONF VISUAL FIELD
OS_INFERIOR_NASAL_RESTRICTION: 0
METHOD: COUNTING FINGERS
OD_INFERIOR_NASAL_RESTRICTION: 0
OS_INFERIOR_TEMPORAL_RESTRICTION: 0
OD_NORMAL: 1
OD_INFERIOR_TEMPORAL_RESTRICTION: 0
OS_NORMAL: 1
OS_SUPERIOR_TEMPORAL_RESTRICTION: 0
OD_SUPERIOR_TEMPORAL_RESTRICTION: 0
OD_SUPERIOR_NASAL_RESTRICTION: 0
OS_SUPERIOR_NASAL_RESTRICTION: 0

## 2024-06-20 ASSESSMENT — CUP TO DISC RATIO
OS_RATIO: 0.25
OD_RATIO: 0.25

## 2024-06-20 ASSESSMENT — REFRACTION_MANIFEST
OD_CYLINDER: +0.50
OD_SPHERE: -0.25
OD_AXIS: 020
OS_SPHERE: -1.00
OS_CYLINDER: +0.50
OS_AXIS: 178

## 2024-06-20 ASSESSMENT — TONOMETRY
OS_IOP_MMHG: 24
OS_IOP_MMHG: 24
IOP_METHOD: ICARE
OD_IOP_MMHG: 22
IOP_METHOD: ICARE
OD_IOP_MMHG: 25

## 2024-06-20 ASSESSMENT — VISUAL ACUITY
OS_SC+: +1
OS_SC: 20/30
OD_SC+: -2
OS_PH_SC: 20/25
METHOD: SNELLEN - LINEAR
OD_SC: 20/20

## 2024-06-20 ASSESSMENT — SLIT LAMP EXAM - LIDS
COMMENTS: NORMAL
COMMENTS: NORMAL

## 2024-06-20 ASSESSMENT — EXTERNAL EXAM - LEFT EYE: OS_EXAM: NORMAL

## 2024-06-20 ASSESSMENT — EXTERNAL EXAM - RIGHT EYE: OD_EXAM: NORMAL

## 2024-06-20 NOTE — NURSING NOTE
Chief Complaints and History of Present Illnesses   Patient presents with    Eye Exam For Diabetes     Chief Complaint(s) and History of Present Illness(es)       Eye Exam For Diabetes              Laterality: both eyes    Context: distance vision    Associated symptoms: floaters (unsure which eye, no flashes or loss of vision).  Negative for dryness, eye pain, redness and tearing    Treatments tried: no treatments    Pain scale: 0/10              Comments    Type 2, NIDDM, on medication, fasting BS unknown, patient does not check.    Lab Results       Component                Value               Date                       A1C                      8.5                 04/16/2024                 A1C                      9.9                 01/16/2024                 A1C                      6.8                 10/10/2023                 A1C                      7.2                 07/11/2023                 A1C                      11.0                04/11/2023              Patient states concerned with how BS could affect vision but currently has not noticed any fluctuating vision.    Isabell Sin on 6/20/2024 at 12:10 PM

## 2024-06-20 NOTE — PROGRESS NOTES
A/P  1.) Type 2 DM without ophthalmic manifestation each eye  -Last A1c 8.5, no history of diabetic retinopathy  -Reviewed effects of DM on the eyes and importance of good blood sugar control  -Monitor annually with dilation    2.) Astigmatism with myopia left eye>right eye  -Glasses wear prn. Not currently with glasses but he may prefer to wear as desired    Monitor 1 year diabetic eye exam    I have confirmed the patient's CC, HPI and reviewed Past Medical History, Past Surgical History, Social History, Family History, Problem List, Medication List and agree with Tech note.     Merari Faustin, OD FAAO FSLS

## 2024-06-24 ENCOUNTER — TELEPHONE (OUTPATIENT)
Dept: FAMILY MEDICINE | Facility: CLINIC | Age: 34
End: 2024-06-24

## 2024-06-24 NOTE — TELEPHONE ENCOUNTER
Prior Authorization Retail Medication Request    Medication/Dose: Permethrin (ELIMITE) 5 % external cream   Diagnosis and ICD code (if different than what is on RX):  Rash   New/renewal/insurance change PA/secondary ins. PA:  Previously Tried and Failed:  See Chart  Rationale:  See Chart     Insurance   Primary: MEDICARE   Insurance ID:  [id number]   Secondary (if applicable):    Pharmacy Information (if different than what is on RX)  Name: [facility]   Phone:  [phone]   Fax:[phone]

## 2024-06-27 NOTE — TELEPHONE ENCOUNTER
PA Initiation    Medication: PERMETHRIN 5 % EX CREA  Insurance Company: [employer] - Phone [phone] Fax [phone]  Pharmacy Filling the Rx: [facility] [place], [place] - [address] [place] [address] [phone]  Filling Pharmacy Phone: [phone]  Filling Pharmacy Fax: [phone]  Start Date: 6/27/2024

## 2024-06-27 NOTE — TELEPHONE ENCOUNTER
Prior Authorization Not Needed per Insurance    Medication: PERMETHRIN 5 % EX CREA  Insurance Company: [employer] - Phone [phone] Fax [phone]  Expected CoPay: $    Pharmacy Filling the Rx: [facility] [place], [place] - [address] [place] [address] [phone]  Pharmacy Notified: YES  Patient Notified: **Instructed pharmacy to notify patient when script is ready to /ship.**    Plan covers 60g per 30 days.  Pharmacy was processing 60g per 7 days.  Insurance plan denied that request stating that they cover 60g per 30 days.  Pharmacy changed day supply and received a paid claim.

## 2024-07-01 ENCOUNTER — TELEPHONE (OUTPATIENT)
Dept: FAMILY MEDICINE | Facility: CLINIC | Age: 34
End: 2024-07-01

## 2024-07-16 ENCOUNTER — OFFICE VISIT (OUTPATIENT)
Dept: PHARMACY | Facility: CLINIC | Age: 34
End: 2024-07-16
Payer: MEDICARE

## 2024-07-16 ENCOUNTER — OFFICE VISIT (OUTPATIENT)
Dept: FAMILY MEDICINE | Facility: CLINIC | Age: 34
End: 2024-07-16
Payer: MEDICARE

## 2024-07-16 VITALS
TEMPERATURE: 97.8 F | SYSTOLIC BLOOD PRESSURE: 118 MMHG | HEIGHT: 64 IN | WEIGHT: 207.4 LBS | RESPIRATION RATE: 18 BRPM | DIASTOLIC BLOOD PRESSURE: 87 MMHG | OXYGEN SATURATION: 97 % | HEART RATE: 88 BPM | BODY MASS INDEX: 35.41 KG/M2

## 2024-07-16 DIAGNOSIS — E11.9 DIABETES MELLITUS, TYPE 2 (H): Primary | ICD-10-CM

## 2024-07-16 DIAGNOSIS — E11.65 TYPE 2 DIABETES MELLITUS WITH HYPERGLYCEMIA, WITHOUT LONG-TERM CURRENT USE OF INSULIN (H): Primary | ICD-10-CM

## 2024-07-16 DIAGNOSIS — Z23 NEED FOR PROPHYLACTIC VACCINATION AGAINST HEPATITIS B VIRUS: ICD-10-CM

## 2024-07-16 DIAGNOSIS — E66.01 CLASS 2 SEVERE OBESITY WITH SERIOUS COMORBIDITY AND BODY MASS INDEX (BMI) OF 35.0 TO 35.9 IN ADULT, UNSPECIFIED OBESITY TYPE (H): ICD-10-CM

## 2024-07-16 DIAGNOSIS — E78.5 HYPERLIPIDEMIA LDL GOAL <70: ICD-10-CM

## 2024-07-16 DIAGNOSIS — E66.812 CLASS 2 SEVERE OBESITY WITH SERIOUS COMORBIDITY AND BODY MASS INDEX (BMI) OF 35.0 TO 35.9 IN ADULT, UNSPECIFIED OBESITY TYPE (H): ICD-10-CM

## 2024-07-16 DIAGNOSIS — Z23 NEED FOR PNEUMOCOCCAL VACCINE: ICD-10-CM

## 2024-07-16 LAB — HBA1C MFR BLD: 10.2 %

## 2024-07-16 PROCEDURE — 82040 ASSAY OF SERUM ALBUMIN: CPT | Mod: ORL | Performed by: NURSE PRACTITIONER

## 2024-07-16 PROCEDURE — 83036 HEMOGLOBIN GLYCOSYLATED A1C: CPT | Mod: ORL | Performed by: NURSE PRACTITIONER

## 2024-07-16 PROCEDURE — 80061 LIPID PANEL: CPT | Mod: ORL | Performed by: NURSE PRACTITIONER

## 2024-07-16 ASSESSMENT — PAIN SCALES - GENERAL: PAINLEVEL: NO PAIN (1)

## 2024-07-16 NOTE — LETTER
July 17, 2024      Pedro Funez  [address] [place] [address]     [facility] [address]        Dear Mr.Siegl Funez,    We are writing to inform you of your test results.    Please make an appointment with your provider to review or follow up on your test results.  Appointments can be made by calling [phone].    Resulted Orders   Hemoglobin A1c   Result Value Ref Range    Hemoglobin A1C 10.2 (H) <5.7 %      Comment:      Normal <5.7%   Prediabetes 5.7-6.4%    Diabetes 6.5% or higher     Note: Adopted from ADA consensus guidelines.   Comprehensive metabolic panel   Result Value Ref Range    Sodium 135 135 - 145 mmol/L    Potassium 4.7 3.4 - 5.3 mmol/L    Carbon Dioxide (CO2) 22 22 - 29 mmol/L    Anion Gap 12 7 - 15 mmol/L    Urea Nitrogen 10.6 6.0 - 20.0 mg/dL    Creatinine 0.72 0.67 - 1.17 mg/dL    GFR Estimate >90 >60 mL/min/1.73m2      Comment:      eGFR calculated using 2021 CKD-EPI equation.    Calcium 9.2 8.8 - 10.4 mg/dL      Comment:      Reference intervals for this test were updated on 7/16/2024 to reflect our healthy population more accurately. There may be differences in the flagging of prior results with similar values performed with this method. Those prior results can be interpreted in the context of the updated reference intervals.    Chloride 101 98 - 107 mmol/L    Glucose 298 (H) 70 - 99 mg/dL    Alkaline Phosphatase 79 40 - 150 U/L    AST 33 0 - 45 U/L    ALT 57 0 - 70 U/L    Protein Total 7.2 6.4 - 8.3 g/dL    Albumin 4.4 3.5 - 5.2 g/dL    Bilirubin Total 0.4 <=1.2 mg/dL   Lipid panel reflex to direct LDL Fasting   Result Value Ref Range    Cholesterol 214 (H) <200 mg/dL    Triglycerides 197 (H) <150 mg/dL    Direct Measure HDL 32 (L) >=40 mg/dL    LDL Cholesterol Calculated 143 (H) <=100 mg/dL    Non HDL Cholesterol 182 (H) <130 mg/dL    Patient Fasting > 8hrs? Unknown     Narrative    Cholesterol  Desirable:  <200 mg/dL    Triglycerides  Normal:  Less than 150 mg/dL  Borderline High:  150-199  mg/dL  High:  200-499 mg/dL  Very High:  Greater than or equal to 500 mg/dL    Direct Measure HDL  Female:  Greater than or equal to 50 mg/dL   Male:  Greater than or equal to 40 mg/dL    LDL Cholesterol  Desirable:  <100mg/dL  Above Desirable:  100-129 mg/dL   Borderline High:  130-159 mg/dL   High:  160-189 mg/dL   Very High:  >= 190 mg/dL    Non HDL Cholesterol  Desirable:  130 mg/dL  Above Desirable:  130-159 mg/dL  Borderline High:  160-189 mg/dL  High:  190-219 mg/dL  Very High:  Greater than or equal to 220 mg/dL     Jet Vasquez, please schedule a follow up to review these lab results. There is some improvement in cholesterol levels although they remain high. Your 3 month blood sugar average is a little worse but that may reflect period before you were taking your meds regularly. Would like to discuss with you. Thanks  If you have any questions or concerns, please call the clinic at the number listed above.       Sincerely,      JUAN ALBERTO Moncada CNP

## 2024-07-16 NOTE — PATIENT INSTRUCTIONS
Diabetes  Continue current medications  Consider SGLT2i addition if A1c remains elevated  Check A1c and CMP today  Continue with daily exercise, efforts toward healthy diet and weight loss.   Let me know if you want diu    Rash  Monitor  Wash clothes and bathe regularly. Return if new lesions    Elevated cholesterol levels  ASCVD risk is 7.5% so might benefit from statin  Will discuss with Pharmacist also  Recheck lipids today  Continue with diet and exercise    Health Maintenance  Pneumococcal vaccine today  Hepatitis B vaccine: #1 today will need #2 in 1 month

## 2024-07-16 NOTE — NURSING NOTE
"ROOM:2  MYRNA MARTINEZ    Preferred Name: Pedro     How did you hear about us?  Current Patient     Services Provided? No    34 year old  Chief Complaint   Patient presents with    Follow Up     Going over previous health issues        Blood pressure 118/87, pulse 88, temperature 97.8  F (36.6  C), temperature source Oral, resp. rate 18, height 1.628 m (5' 4.1\"), weight 94.1 kg (207 lb 6.4 oz), SpO2 97%. Body mass index is 35.49 kg/m .  BP completed using cuff size:        Patient Active Problem List   Diagnosis    Diabetes mellitus, type 2 (H)    Morbid obesity (H)    MDD (major depressive disorder), recurrent episode, moderate (H)    Intertrigo    GERD (gastroesophageal reflux disease)    REBA (generalized anxiety disorder)    Essential hypertension    Autism spectrum disorder    Chronic kidney disease, stage 1    Wheezing    Rash       Wt Readings from Last 2 Encounters:   07/16/24 94.1 kg (207 lb 6.4 oz)   06/18/24 94.3 kg (207 lb 12.8 oz)     BP Readings from Last 3 Encounters:   07/16/24 118/87   06/18/24 133/88   05/14/24 132/83       No Known Allergies    Current Outpatient Medications   Medication Sig Dispense Refill    ACCU-CHEK GUIDE test strip USE TO TEST BLOOD SUGAR ONE TIME DAILY OR AS DIRECTED. 200 strip 4    blood glucose (NO BRAND SPECIFIED) lancets standard Use to test blood sugar one times daily or as directed. 100 Lancet 1    blood glucose monitoring (ACCU-CHEK FERMIN PLUS) meter device kit Use to test blood sugars one time daily or as directed. 1 kit 0    blood glucose monitoring (ACCU-CHEK MULTICLIX) lancets Use to test blood sugar 1 times daily or as directed. 102 each 1    Blood Pressure Monitoring (BLOOD PRESSURE CUFF) MISC 1 Units daily 1 each 0    buPROPion (WELLBUTRIN XL) 150 MG 24 hr tablet Take 1 tablet (150 mg) by mouth every morning 90 tablet 1    Continuous Glucose Sensor (FREESTYLE FATOU 3 SENSOR) MISC 1 each every 14 days Use 1 sensor every 14 days. Use to read " blood sugars per 's instructions. 6 each 5    fluticasone (FLONASE) 50 MCG/ACT nasal spray Spray 1-2 sprays into both nostrils daily 11.1 mL 2    hydrOXYzine HCl (ATARAX) 25 MG tablet TAKE 1 TABLET (25 MG) BY MOUTH 3 TIMES DAILY AS NEEDED FOR ANXIETY 90 tablet 0    insulin pen needle (32G X 4 MM) 32G X 4 MM miscellaneous Use 1 pen needles daily or as directed. 30 each 11    liraglutide (VICTOZA) 18 MG/3ML solution Inject 1.2 mg Subcutaneous daily 3 mL 2    lisinopril (ZESTRIL) 10 MG tablet Take 1 tablet (10 mg) by mouth daily 90 tablet 1    loratadine (CLARITIN) 10 MG tablet Take 1 tablet (10 mg) by mouth daily 90 tablet 1    metFORMIN (GLUCOPHAGE XR) 500 MG 24 hr tablet Take 4 tablets (2,000 mg) by mouth daily (with dinner) 120 tablet 2    mometasone-formoterol (DULERA) 100-5 MCG/ACT inhaler Inhale 2 puffs into the lungs 2 times daily 13 g 2    permethrin (ELIMITE) 5 % external cream Apply to clean, dry hair and leave on overnight or for 8-14 hours before washing off with water. 60 g 1    Skin Protectants, Misc. (EUCERIN) cream Apply topically 2 times daily as needed for dry skin 240 g 1     No current facility-administered medications for this visit.       Social History     Tobacco Use    Smoking status: Former     Current packs/day: 0.00     Types: Cigarettes     Quit date: 2016     Years since quittin.5     Passive exposure: Never    Smokeless tobacco: Current     Types: Chew    Tobacco comments:     Chewing tobacco    Vaping Use    Vaping status: Every Day    Substances: Nicotine    Passive vaping exposure: Yes   Substance Use Topics    Alcohol use: Yes     Comment: 1 beer periodically. Has binged in the past    Drug use: No       Honoring Choices - Health Care Directive Guide offered to patient at time of visit.    Health Maintenance Due   Topic Date Due    ASTHMA ACTION PLAN  Never done    ADVANCE CARE PLANNING  Never done    DEPRESSION ACTION PLAN  Never done    Pneumococcal Vaccine:  "Pediatrics (0 to 5 Years) and At-Risk Patients (6 to 64 Years) (1 of 2 - PCV) Never done    HEPATITIS A IMMUNIZATION (1 of 2 - Risk 2-dose series) Never done    HEPATITIS B IMMUNIZATION (1 of 3 - 19+ 3-dose series) Never done    MEDICARE ANNUAL WELLNESS VISIT  08/17/2022    COVID-19 Vaccine (3 - 2023-24 season) 09/01/2023    DIABETIC FOOT EXAM  04/11/2024    A1C  07/16/2024       Immunization History   Administered Date(s) Administered    COVID-19 Monovalent 12+ (Pfizer 2022) 01/25/2022    COVID-19 Vaccine (Miah) 05/08/2021    Historical DTP/aP 1988, 1988, 02/01/1989, 04/10/1990, 09/01/1993    Influenza Vaccine >6 months,quad, PF 09/25/2016, 10/23/2019, 10/04/2022, 10/10/2023    Influenza, seasonal, injectable, PF 10/23/2019    MMR 04/10/1990, 04/26/2000    Polio, Unspecified 1988, 1988, 04/10/1990, 09/01/1993    Poliovirus, inactivated (IPV) 1988, 1988, 04/10/1990, 09/01/1993    TDAP Vaccine (Adacel) 03/22/2019    Td (Adult), Adsorbed 04/26/2000       No results found for: \"PAP\"    Recent Labs   Lab Test 04/25/24  1424 04/16/24  1535 01/16/24  1520 10/10/23  1504 07/11/23  1517 04/11/23  1619 12/14/21  1618 08/17/21  1452   A1C  --  8.5* 9.9* 6.8*   < > 11.0*   < > 6.9   LDL  --  133* 189* 133*  --  158*   < > 124*   HDL  --  47 43 45  --  34*   < > 42   TRIG  --  309* 269* 213*  --  289*   < > 119   ALT  --   --   --   --   --  34  --  40   CR 0.95  --   --   --   --  0.89   < > 0.75   GFRESTIMATED >90  --   --   --   --  >90   < > >90   ALBUMIN  --   --   --   --   --  4.6  --  4.0   POTASSIUM 4.4  --   --   --   --  4.9   < > 4.1   TSH  --   --   --   --   --   --   --  1.39    < > = values in this interval not displayed.           7/16/2024     1:08 PM 6/18/2024     2:27 PM   PHQ-2 ( 1999 Pfizer)   Q1: Little interest or pleasure in doing things 1 0   Q2: Feeling down, depressed or hopeless 0 0   PHQ-2 Score 1 0   Q1: Little interest or pleasure in doing things Several " days Not at all   Q2: Feeling down, depressed or hopeless Not at all Not at all   PHQ-2 Score 1 0           6/14/2022     2:09 PM 10/4/2022     2:36 PM 10/4/2022     2:42 PM 4/16/2024     1:56 PM   PHQ-9 SCORE   PHQ-9 Total Score MyChart    4 (Minimal depression)   PHQ-9 Total Score 6 4 4 4           9/7/2021    11:28 AM 10/4/2022     2:42 PM 4/16/2024     1:56 PM   REBA-7 SCORE   Total Score   5 (mild anxiety)   Total Score 2 3 5           4/16/2024     1:57 PM 6/18/2024     2:29 PM   ACT Total Scores   ACT TOTAL SCORE (Goal Greater than or Equal to 20) 21 23   In the past 12 months, how many times did you visit the emergency room for your asthma without being admitted to the hospital? 0 0   In the past 12 months, how many times were you hospitalized overnight because of your asthma? 0 0       Chase Yap    July 16, 2024 1:19 PM

## 2024-07-16 NOTE — PROGRESS NOTES
SUBJECTIVE: Pedro Funez is a 34 year old male who was referred by Mirian Lo for [facility] services for diabetes management.     Medication use: Pedro thinks he is taking all of his medications about 3-4 days weekly.    Diabetes: Pedro notes he is taking his medications more regularly. He has now been putting alarms in his phone. He thinks he is taking Victoza about 3-4 times weekly. When he doesn't take it, it is because his alarm goes off when he is out of the house.     At his previous appointment today, his PCP talked with him about an SGLT2. He doesn't have any questions today about that and he has noticed that things have been getting better with his glycemic control.     Lifestyle: He has been eating more healthy foods and he feels that this has helped with his memory  He also shares that a friend recently moved out and with her moving out things have improved for him.     ASCVD risk: At his previous appointment today, his PCP talked with him about a statin. He doesn't have any questions today about that.    Environmental factors that impact patient: His roommate somewhat recently moved out of his apartment and that has improved his living situation.    Preferred Pharmacy:    [facility]  [facility] [place], [place] - [phone]  [facility] - [place], [place] - [address]     OBJECTIVE:    Patient Active Problem List   Diagnosis    Diabetes mellitus, type 2 (H)    Morbid obesity (H)    MDD (major depressive disorder), recurrent episode, moderate (H)    Intertrigo    GERD (gastroesophageal reflux disease)    REBA (generalized anxiety disorder)    Essential hypertension    Autism spectrum disorder    Chronic kidney disease, stage 1    Wheezing    Rash        Current Outpatient Medications   Medication Sig Dispense Refill    ACCU-CHEK GUIDE test strip USE TO TEST BLOOD SUGAR ONE TIME DAILY OR AS  DIRECTED. 200 strip 4    blood glucose (NO BRAND SPECIFIED) lancets standard Use to test blood sugar one times daily or as directed. 100 Lancet 1    blood glucose monitoring (ACCU-CHEK FERMIN PLUS) meter device kit Use to test blood sugars one time daily or as directed. 1 kit 0    blood glucose monitoring (ACCU-CHEK MULTICLIX) lancets Use to test blood sugar 1 times daily or as directed. 102 each 1    Blood Pressure Monitoring (BLOOD PRESSURE CUFF) MISC 1 Units daily 1 each 0    buPROPion (WELLBUTRIN XL) 150 MG 24 hr tablet Take 1 tablet (150 mg) by mouth every morning 90 tablet 1    Continuous Glucose Sensor (FREESTYLE FATOU 3 SENSOR) MISC 1 each every 14 days Use 1 sensor every 14 days. Use to read blood sugars per 's instructions. 6 each 5    fluticasone (FLONASE) 50 MCG/ACT nasal spray Spray 1-2 sprays into both nostrils daily 11.1 mL 2    hydrOXYzine HCl (ATARAX) 25 MG tablet TAKE 1 TABLET (25 MG) BY MOUTH 3 TIMES DAILY AS NEEDED FOR ANXIETY 90 tablet 0    insulin pen needle (32G X 4 MM) 32G X 4 MM miscellaneous Use 1 pen needles daily or as directed. 30 each 11    liraglutide (VICTOZA) 18 MG/3ML solution Inject 1.2 mg Subcutaneous daily 3 mL 2    lisinopril (ZESTRIL) 10 MG tablet Take 1 tablet (10 mg) by mouth daily 90 tablet 1    loratadine (CLARITIN) 10 MG tablet Take 1 tablet (10 mg) by mouth daily 90 tablet 1    metFORMIN (GLUCOPHAGE XR) 500 MG 24 hr tablet Take 4 tablets (2,000 mg) by mouth daily (with dinner) 120 tablet 2    mometasone-formoterol (DULERA) 100-5 MCG/ACT inhaler Inhale 2 puffs into the lungs 2 times daily 13 g 2    permethrin (ELIMITE) 5 % external cream Apply to clean, dry hair and leave on overnight or for 8-14 hours before washing off with water. 60 g 1    Skin Protectants, Misc. (EUCERIN) cream Apply topically 2 times daily as needed for dry skin 240 g 1       ASSESSMENT:    Diabetes: Not at goal based on A1c drawn today.  Pedro will still benefit from taking medications  consistently and continuing to work on lifestyle changes    ASCVD risk: Borderline goal.  Pedro does have risk factors which increase his risk of CV disease and in that respect a statin could be beneficial.  At the same time, his age doesn't necessarily require a statin based on guidelines such as ADA. Since Pedro wants to focus on his current medications, it might make the most sense to do that for now and then consider a statin at a later point in the future.     All medications were reviewed and found to be indicated, effective, safe and convenient unless drug therapy problem identified as described above.     PLAN:    Discussed statin and SGLT2; will hold off on those for now  Discussed more memory aides for taking current medications  Follow up in 3 months, sooner if needed    Options for treatment and/or follow-up care were reviewed with the patient. Pedro Funez was engaged and actively involved in the decision making process. He/She verbalized understanding of the options discussed and was satisfied with the final plan.     Patient was provided with written instructions/medication list via AVS.     BILLING:     Medical conditions reviewed: 2     Medications reviewed: 4     MTP identified: 1     Time spent: 30 minutes     Level of service:   , nc

## 2024-07-16 NOTE — PROGRESS NOTES
HPI       Pedro Funez is a 34 year old  who presents for   Chief Complaint   Patient presents with    Follow Up     Going over previous health issues        34 year-old male with history obesity, DM2, depression presents to clinic for follow up DM. Is walking most days now.  Not checking BS but is taking medications regularly. Trying to eat healthy. Feels better.    Recent heartburn but that has subsided with better diet.  Sleep is improved: longer duration but still falls asleep 4-5 am sometimes and then awakens around 12 noon. Is trying to use audio tapes to go to sleep.  Walking during day  Diet has improved, making effort to reduce high fat foods.  Trying to manage money.   Environment is somewhat better but still problems.   Denies CP, SOB. Using inhalers sometimes but overall feeling breathing is much better. Tolerating activity well. Denies chest pain    Also notes papers I filled out previously for housing were not received by housing office. They were faxed. He needs them resent.     Problem, Medication and Allergy Lists were reviewed and updated if needed..    Patient is an established patient of this clinic..         Review of Systems:   Review of Systems  GEN: negative fever, working on weight loss  EYES: negative for vision changes  CV: negative CP, irregular heart beat. Initial BP was elevated, follow up wsa  RESP:  negative wheeze, cough, SOB  ENDO: as per HPI  : negative for burning on urination or frequency  NEURO: negative for numbness or tingling in extremities. Checks feet at night  SKIN: had rash at last encounter. Never got Elimite cream. Feels rash is subsiding. No itch. Thinks it was due to not washing clothes. He has now washed clothes and linens and trying to bathe more regularly. He has not gotten any new skin lesions. Checked environment and did not see any bed bugs.   MOOD: feeling better overall, more motivated.        Physical Exam:     Vitals:    07/16/24 1314 07/16/24  "1318   BP: (!) 135/92 118/87   BP Location: Left arm Left arm   Patient Position: Sitting Sitting   Cuff Size: Adult Large Adult Large   Pulse: 88    Resp: 18    Temp: 97.8  F (36.6  C)    TempSrc: Oral    SpO2: 97%    Weight: 94.1 kg (207 lb 6.4 oz)    Height: 1.628 m (5' 4.1\")      Body mass index is 35.49 kg/m .  Vital signs normal except BP Initially elevated and down to normal on sitting.      Physical Exam  GEN: alert male in NAD. Talkative. Appearance is clean today. Hair has been cut from previous.   SKIN: erythematous papular rash over back and lower extremities. No scratch marks. All are dried.   RESP: Lungs CTA with air entry throughout  CV: HRRR, S1., S2 No MRG  FOOT EXAM: sensation intact to monofilament. Erythematous area 1cm in size over arch. CR< 2 seconds.  Shoes somewhat worn inside.       Results:   Results are ordered and pending    Assessment and Plan        1. Type 2 diabetes mellitus with hyperglycemia, without long-term current use of insulin (H)  Reviewed previous labs.  Albumin:Creatinine ratio is slightly elevated but taking liraglutide again regularly so might improved. Discussed improved BS will help this.  He seems committed to continuing meds at this time. Will recheck A1c, CMP today. Lipids high in past. He is young but might benefit from statin at some point. He has been losing weight and working on diet so this is reinforced today.   Consulted with pharmacist, Nuria Avitia also and reviewed current status. Will continue current therapies for now as making some positive changes. Follow up mid October.   - Hemoglobin A1c; Future  - Comprehensive metabolic panel; Future  - Hemoglobin A1c  - Comprehensive metabolic panel    2. Class 2 severe obesity with serious comorbidity and body mass index (BMI) of 35.0 to 35.9 in adult, unspecified obesity type (H)  Will check CMP with concern for potential fatty liver. Reviewed previous lipid results with patient.   - Comprehensive metabolic panel; " Future  - Comprehensive metabolic panel    3. Hyperlipidemia LDL goal <70  As above. Has not eaten so will repeat today.   - Lipid panel reflex to direct LDL Fasting; Future  - Lipid panel reflex to direct LDL Fasting    4. Need for pneumococcal vaccine  Has not add. Given chronic illnesses, recommended. Reviewed rationale and potential adverse effects  - PNEUMOCOCCAL 20 VALENT CONJUGATE (PREVNAR 20)    5. Need for prophylactic vaccination against hepatitis B virus  No evidence of Hepatitis B vaccines and agrees to start. Will need next dose in 1 month. Reviewed with patient.   - HEPATITIS B, ADULT 20+ (ENGERIX-B/RECOMBIVAX HB)    Monitor rash. Could be heat related. Continue with personal hygiene measures. RTC if getting new lesions.      There are no discontinued medications.    Options for treatment and follow-up care were reviewed with the patient. Pedro Funez  engaged in the decision making process and verbalized understanding of the options discussed and agreed with the final plan.    JUAN ALBERTO Moncada CNP

## 2024-07-17 ENCOUNTER — TELEPHONE (OUTPATIENT)
Dept: FAMILY MEDICINE | Facility: CLINIC | Age: 34
End: 2024-07-17

## 2024-07-17 LAB
ALBUMIN SERPL BCG-MCNC: 4.4 G/DL (ref 3.5–5.2)
ALP SERPL-CCNC: 79 U/L (ref 40–150)
ALT SERPL W P-5'-P-CCNC: 57 U/L (ref 0–70)
ANION GAP SERPL CALCULATED.3IONS-SCNC: 12 MMOL/L (ref 7–15)
AST SERPL W P-5'-P-CCNC: 33 U/L (ref 0–45)
BILIRUB SERPL-MCNC: 0.4 MG/DL
BUN SERPL-MCNC: 10.6 MG/DL (ref 6–20)
CALCIUM SERPL-MCNC: 9.2 MG/DL (ref 8.8–10.4)
CHLORIDE SERPL-SCNC: 101 MMOL/L (ref 98–107)
CHOLEST SERPL-MCNC: 214 MG/DL
CREAT SERPL-MCNC: 0.72 MG/DL (ref 0.67–1.17)
EGFRCR SERPLBLD CKD-EPI 2021: >90 ML/MIN/1.73M2
FASTING STATUS PATIENT QL REPORTED: ABNORMAL
GLUCOSE SERPL-MCNC: 298 MG/DL (ref 70–99)
HCO3 SERPL-SCNC: 22 MMOL/L (ref 22–29)
HDLC SERPL-MCNC: 32 MG/DL
LDLC SERPL CALC-MCNC: 143 MG/DL
NONHDLC SERPL-MCNC: 182 MG/DL
POTASSIUM SERPL-SCNC: 4.7 MMOL/L (ref 3.4–5.3)
PROT SERPL-MCNC: 7.2 G/DL (ref 6.4–8.3)
SODIUM SERPL-SCNC: 135 MMOL/L (ref 135–145)
TRIGL SERPL-MCNC: 197 MG/DL

## 2024-07-17 NOTE — TELEPHONE ENCOUNTER
----- Message from Mirian Lo sent at 7/17/2024  8:43 AM CDT -----  Dear Mr. Janiya Funez,    Lab letter sent. Please call patient to schedule follow up for lab results.     Mirian AVENDANO CNP

## 2024-07-23 ENCOUNTER — OFFICE VISIT (OUTPATIENT)
Dept: FAMILY MEDICINE | Facility: CLINIC | Age: 34
End: 2024-07-23
Payer: MEDICARE

## 2024-07-23 VITALS
TEMPERATURE: 98.5 F | SYSTOLIC BLOOD PRESSURE: 133 MMHG | RESPIRATION RATE: 18 BRPM | BODY MASS INDEX: 34.98 KG/M2 | HEART RATE: 97 BPM | DIASTOLIC BLOOD PRESSURE: 84 MMHG | WEIGHT: 204.4 LBS | OXYGEN SATURATION: 98 %

## 2024-07-23 DIAGNOSIS — R89.9 ABNORMAL LABORATORY TEST RESULT: Primary | ICD-10-CM

## 2024-07-23 ASSESSMENT — PAIN SCALES - GENERAL: PAINLEVEL: NO PAIN (0)

## 2024-07-23 NOTE — PATIENT INSTRUCTIONS
Type 2 DM  Repeat around 10/16  Repeat albumin creatinine ration at same time  Goal A1c 7.5-7.0  Range BS   Continue to take medications regularly    Elevated cholesterol  Work on low fat, low salt diet which will help cholesterol levels, blood pressure  Recheck in October

## 2024-07-23 NOTE — NURSING NOTE
ROOM:1  MYRNA MARTINEZ    Preferred Name: Pedro     How did you hear about us?  Current Patient     Services Provided? No    34 year old  Chief Complaint   Patient presents with    Follow Up       Blood pressure 133/84, pulse 97, temperature 98.5  F (36.9  C), temperature source Oral, resp. rate 18, weight 92.7 kg (204 lb 6.4 oz), SpO2 98%. Body mass index is 34.98 kg/m .  BP completed using cuff size:        Patient Active Problem List   Diagnosis    Diabetes mellitus, type 2 (H)    Morbid obesity (H)    MDD (major depressive disorder), recurrent episode, moderate (H)    Intertrigo    GERD (gastroesophageal reflux disease)    REBA (generalized anxiety disorder)    Essential hypertension    Autism spectrum disorder    Chronic kidney disease, stage 1    Wheezing    Rash       Wt Readings from Last 2 Encounters:   07/23/24 92.7 kg (204 lb 6.4 oz)   07/16/24 94.1 kg (207 lb 6.4 oz)     BP Readings from Last 3 Encounters:   07/23/24 133/84   07/16/24 118/87   06/18/24 133/88       No Known Allergies    Current Outpatient Medications   Medication Sig Dispense Refill    ACCU-CHEK GUIDE test strip USE TO TEST BLOOD SUGAR ONE TIME DAILY OR AS DIRECTED. 200 strip 4    blood glucose (NO BRAND SPECIFIED) lancets standard Use to test blood sugar one times daily or as directed. 100 Lancet 1    blood glucose monitoring (ACCU-CHEK FERMIN PLUS) meter device kit Use to test blood sugars one time daily or as directed. 1 kit 0    blood glucose monitoring (ACCU-CHEK MULTICLIX) lancets Use to test blood sugar 1 times daily or as directed. 102 each 1    Blood Pressure Monitoring (BLOOD PRESSURE CUFF) MISC 1 Units daily 1 each 0    buPROPion (WELLBUTRIN XL) 150 MG 24 hr tablet Take 1 tablet (150 mg) by mouth every morning 90 tablet 1    Continuous Glucose Sensor (FREESTYLE FATOU 3 SENSOR) MISC 1 each every 14 days Use 1 sensor every 14 days. Use to read blood sugars per 's instructions. 6 each 5    fluticasone  (FLONASE) 50 MCG/ACT nasal spray Spray 1-2 sprays into both nostrils daily 11.1 mL 2    hydrOXYzine HCl (ATARAX) 25 MG tablet TAKE 1 TABLET (25 MG) BY MOUTH 3 TIMES DAILY AS NEEDED FOR ANXIETY 90 tablet 0    insulin pen needle (32G X 4 MM) 32G X 4 MM miscellaneous Use 1 pen needles daily or as directed. 30 each 11    liraglutide (VICTOZA) 18 MG/3ML solution Inject 1.2 mg Subcutaneous daily 3 mL 2    lisinopril (ZESTRIL) 10 MG tablet Take 1 tablet (10 mg) by mouth daily 90 tablet 1    loratadine (CLARITIN) 10 MG tablet Take 1 tablet (10 mg) by mouth daily 90 tablet 1    metFORMIN (GLUCOPHAGE XR) 500 MG 24 hr tablet Take 4 tablets (2,000 mg) by mouth daily (with dinner) 120 tablet 2    mometasone-formoterol (DULERA) 100-5 MCG/ACT inhaler Inhale 2 puffs into the lungs 2 times daily 13 g 2    permethrin (ELIMITE) 5 % external cream Apply to clean, dry hair and leave on overnight or for 8-14 hours before washing off with water. 60 g 1    Skin Protectants, Misc. (EUCERIN) cream Apply topically 2 times daily as needed for dry skin 240 g 1     No current facility-administered medications for this visit.       Social History     Tobacco Use    Smoking status: Former     Current packs/day: 0.00     Types: Cigarettes     Quit date:      Years since quittin.5     Passive exposure: Never    Smokeless tobacco: Current     Types: Chew    Tobacco comments:     Chewing tobacco    Vaping Use    Vaping status: Every Day    Substances: Nicotine    Passive vaping exposure: Yes   Substance Use Topics    Alcohol use: Yes     Comment: 1 beer periodically. Has binged in the past    Drug use: No       Honoring Choices - Health Care Directive Guide offered to patient at time of visit.    Health Maintenance Due   Topic Date Due    ASTHMA ACTION PLAN  Never done    ADVANCE CARE PLANNING  Never done    DEPRESSION ACTION PLAN  Never done    HEPATITIS A IMMUNIZATION (1 of 2 - Risk 2-dose series) Never done    MEDICARE ANNUAL WELLNESS VISIT  " 08/17/2022    COVID-19 Vaccine (3 - 2023-24 season) 09/01/2023    DIABETIC FOOT EXAM  04/11/2024       Immunization History   Administered Date(s) Administered    COVID-19 Monovalent 12+ (Pfizer 2022) 01/25/2022    COVID-19 Vaccine (Miah) 05/08/2021    Hepatitis B, Adult 07/16/2024    Historical DTP/aP 1988, 1988, 02/01/1989, 04/10/1990, 09/01/1993    Influenza Vaccine >6 months,quad, PF 09/25/2016, 10/23/2019, 10/04/2022, 10/10/2023    Influenza, seasonal, injectable, PF 10/23/2019    MMR 04/10/1990, 04/26/2000    Pneumococcal 20 valent Conjugate (Prevnar 20) 07/16/2024    Polio, Unspecified 1988, 1988, 04/10/1990, 09/01/1993    Poliovirus, inactivated (IPV) 1988, 1988, 04/10/1990, 09/01/1993    TDAP Vaccine (Adacel) 03/22/2019    Td (Adult), Adsorbed 04/26/2000       No results found for: \"PAP\"    Recent Labs   Lab Test 07/16/24  1442 04/25/24  1424 04/16/24  1535 01/16/24  1520 07/11/23  1517 04/11/23  1619 12/14/21  1618 08/17/21  1452   A1C 10.2*  --  8.5* 9.9*   < > 11.0*   < > 6.9   *  --  133* 189*   < > 158*   < > 124*   HDL 32*  --  47 43   < > 34*   < > 42   TRIG 197*  --  309* 269*   < > 289*   < > 119   ALT 57  --   --   --   --  34  --  40   CR 0.72 0.95  --   --   --  0.89   < > 0.75   GFRESTIMATED >90 >90  --   --   --  >90   < > >90   ALBUMIN 4.4  --   --   --   --  4.6  --  4.0   POTASSIUM 4.7 4.4  --   --   --  4.9   < > 4.1   TSH  --   --   --   --   --   --   --  1.39    < > = values in this interval not displayed.           7/16/2024     1:08 PM 6/18/2024     2:27 PM   PHQ-2 ( 1999 Pfizer)   Q1: Little interest or pleasure in doing things 1 0   Q2: Feeling down, depressed or hopeless 0 0   PHQ-2 Score 1 0   Q1: Little interest or pleasure in doing things Several days Not at all   Q2: Feeling down, depressed or hopeless Not at all Not at all   PHQ-2 Score 1 0           6/14/2022     2:09 PM 10/4/2022     2:36 PM 10/4/2022     2:42 PM 4/16/2024     " 1:56 PM   PHQ-9 SCORE   PHQ-9 Total Score MyChart    4 (Minimal depression)   PHQ-9 Total Score 6 4 4 4           9/7/2021    11:28 AM 10/4/2022     2:42 PM 4/16/2024     1:56 PM   REBA-7 SCORE   Total Score   5 (mild anxiety)   Total Score 2 3 5           4/16/2024     1:57 PM 6/18/2024     2:29 PM   ACT Total Scores   ACT TOTAL SCORE (Goal Greater than or Equal to 20) 21 23   In the past 12 months, how many times did you visit the emergency room for your asthma without being admitted to the hospital? 0 0   In the past 12 months, how many times were you hospitalized overnight because of your asthma? 0 0       Chase Yap    July 23, 2024 2:04 PM

## 2024-07-23 NOTE — PROGRESS NOTES
HPI       Pedro Funez is a 34 year old  who presents for   Chief Complaint   Patient presents with    Follow Up       34 year-old male with history Type 2 DM, obesity, HTN returns for lab results. Last A1c 1 week ago was 10.2 up from 8.5. He has recently restarted all medications and confirms that he is taking them regularly. He has started to lose weight and is down 3 more pounds from previous encounter and down 14 pounds from 1/2024. He is taking liraglutide regularly but does not check his BS at home. Denies symptoms of hypoglycemia. Attempting to walk daily and eat more healthy. Is avoiding fast foods and snack foods. Reviewed his labs: Lipids elevated also. Will need to continue to monitor renal function as albumin; creatinine ratio previously elevated.     Problem, Medication and Allergy Lists were reviewed and updated if needed..    Patient is an established patient of this clinic..         Review of Systems:   Review of Systems  GEN: weight loss  CV: negative for CP  ENDO as per HPI         Physical Exam:     Vitals:    07/23/24 1401   BP: 133/84   BP Location: Left arm   Patient Position: Sitting   Cuff Size: Adult Regular   Pulse: 97   Resp: 18   Temp: 98.5  F (36.9  C)   TempSrc: Oral   SpO2: 98%   Weight: 92.7 kg (204 lb 6.4 oz)     Body mass index is 34.98 kg/m .  Vitals were reviewed and were normal     Physical Exam  Deferred as seen last week      Results:   Results from last visit:  Office Visit on 07/16/2024   Component Date Value Ref Range Status    Hemoglobin A1C 07/16/2024 10.2 (H)  <5.7 % Final    Normal <5.7%   Prediabetes 5.7-6.4%    Diabetes 6.5% or higher     Note: Adopted from ADA consensus guidelines.    Sodium 07/16/2024 135  135 - 145 mmol/L Final    Potassium 07/16/2024 4.7  3.4 - 5.3 mmol/L Final    Carbon Dioxide (CO2) 07/16/2024 22  22 - 29 mmol/L Final    Anion Gap 07/16/2024 12  7 - 15 mmol/L Final    Urea Nitrogen 07/16/2024 10.6  6.0 - 20.0 mg/dL Final     Creatinine 07/16/2024 0.72  0.67 - 1.17 mg/dL Final    GFR Estimate 07/16/2024 >90  >60 mL/min/1.73m2 Final    eGFR calculated using 2021 CKD-EPI equation.    Calcium 07/16/2024 9.2  8.8 - 10.4 mg/dL Final    Reference intervals for this test were updated on 7/16/2024 to reflect our healthy population more accurately. There may be differences in the flagging of prior results with similar values performed with this method. Those prior results can be interpreted in the context of the updated reference intervals.    Chloride 07/16/2024 101  98 - 107 mmol/L Final    Glucose 07/16/2024 298 (H)  70 - 99 mg/dL Final    Alkaline Phosphatase 07/16/2024 79  40 - 150 U/L Final    AST 07/16/2024 33  0 - 45 U/L Final    ALT 07/16/2024 57  0 - 70 U/L Final    Protein Total 07/16/2024 7.2  6.4 - 8.3 g/dL Final    Albumin 07/16/2024 4.4  3.5 - 5.2 g/dL Final    Bilirubin Total 07/16/2024 0.4  <=1.2 mg/dL Final    Cholesterol 07/16/2024 214 (H)  <200 mg/dL Final    Triglycerides 07/16/2024 197 (H)  <150 mg/dL Final    Direct Measure HDL 07/16/2024 32 (L)  >=40 mg/dL Final    LDL Cholesterol Calculated 07/16/2024 143 (H)  <=100 mg/dL Final    Non HDL Cholesterol 07/16/2024 182 (H)  <130 mg/dL Final    Patient Fasting > 8hrs? 07/16/2024 Unknown   Final       Assessment and Plan        1. Abnormal laboratory test result  Reviewed all labs. Reinforced need for consistent medication adherence, healthy diet and regular exercise. Reinforced positive weight loss. Discussed link between poor blood sugar control and kidney and heart health.   Follow up in early October and repeat A1c, albumin: creatinine ratio, lipids.          There are no discontinued medications.    Options for treatment and follow-up care were reviewed with the patient. Pedro Funez  engaged in the decision making process and verbalized understanding of the options discussed and agreed with the final plan.    JUAN ALBERTO Moncada CNP

## 2024-10-08 ENCOUNTER — VIRTUAL VISIT (OUTPATIENT)
Dept: PHARMACY | Facility: CLINIC | Age: 34
End: 2024-10-08
Payer: MEDICARE

## 2024-10-08 DIAGNOSIS — F33.1 MDD (MAJOR DEPRESSIVE DISORDER), RECURRENT EPISODE, MODERATE (H): ICD-10-CM

## 2024-10-08 DIAGNOSIS — J45.30 MILD PERSISTENT ASTHMA WITHOUT COMPLICATION: ICD-10-CM

## 2024-10-08 DIAGNOSIS — F41.9 ANXIETY: ICD-10-CM

## 2024-10-08 DIAGNOSIS — I10 BENIGN ESSENTIAL HYPERTENSION: ICD-10-CM

## 2024-10-08 DIAGNOSIS — E11.65 TYPE 2 DIABETES MELLITUS WITH HYPERGLYCEMIA, WITHOUT LONG-TERM CURRENT USE OF INSULIN (H): ICD-10-CM

## 2024-10-08 DIAGNOSIS — J30.89 NON-SEASONAL ALLERGIC RHINITIS, UNSPECIFIED TRIGGER: ICD-10-CM

## 2024-10-08 RX ORDER — LISINOPRIL 10 MG/1
10 TABLET ORAL DAILY
Qty: 90 TABLET | Refills: 1 | Status: SHIPPED | OUTPATIENT
Start: 2024-10-08

## 2024-10-08 RX ORDER — HYDROXYZINE HYDROCHLORIDE 25 MG/1
25 TABLET, FILM COATED ORAL 3 TIMES DAILY PRN
Qty: 90 TABLET | Refills: 0 | Status: SHIPPED | OUTPATIENT
Start: 2024-10-08

## 2024-10-08 RX ORDER — LIRAGLUTIDE 6 MG/ML
1.2 INJECTION SUBCUTANEOUS DAILY
Qty: 3 ML | Refills: 2 | Status: SHIPPED | OUTPATIENT
Start: 2024-10-08

## 2024-10-08 RX ORDER — METFORMIN HYDROCHLORIDE 500 MG/1
2000 TABLET, EXTENDED RELEASE ORAL
Qty: 120 TABLET | Refills: 2 | Status: SHIPPED | OUTPATIENT
Start: 2024-10-08

## 2024-10-08 RX ORDER — FLUTICASONE PROPIONATE 50 MCG
1-2 SPRAY, SUSPENSION (ML) NASAL DAILY
Qty: 11.1 ML | Refills: 2 | Status: SHIPPED | OUTPATIENT
Start: 2024-10-08

## 2024-10-08 RX ORDER — BUPROPION HYDROCHLORIDE 150 MG/1
150 TABLET ORAL EVERY MORNING
Qty: 90 TABLET | Refills: 1 | Status: SHIPPED | OUTPATIENT
Start: 2024-10-08

## 2024-10-08 NOTE — PROGRESS NOTES
"SUBJECTIVE: Pedro Funez is a 34 year old male who was referred by Mirian Lo for [facility] services for medication use.    Medication use: Pedro shares that he's \"starting to get a little low\" on his medications and is planning to  his refills for his medications.    Diabetes: Taking metformin and liraglutide.  Taking sporadically. Not checking his blood sugar at home, but he is interested in starting to check again.     Congestion/ Apnea?: Pedro feels that when he used the Flonase it did help him sleep. He was also prescribed Dulera, but it's unclear to me if he's taking this. When he took the Dulera, he thought it was \"working tremendously.\"  He then found another inhaler and wondered if it was his or someone else's inhaler.     Anxiety: Pedro is interested in continuing to use hydroxyzine. He uses it somewhat infrequently but when it does it works.     Continued Care: Pedro is planning to establish with the [facility] when the NP closes.  He will plan to call today    Environmental factors that impact patient: Pedro shares that he has loud neighbors and some \"stupid things\" at his apartment.     OBJECTIVE:    Patient Active Problem List   Diagnosis    Diabetes mellitus, type 2 (H)    Morbid obesity (H)    MDD (major depressive disorder), recurrent episode, moderate (H)    Intertrigo    GERD (gastroesophageal reflux disease)    REBA (generalized anxiety disorder)    Essential hypertension    Autism spectrum disorder    Chronic kidney disease, stage 1    Wheezing    Rash        Current Outpatient Medications   Medication Sig Dispense Refill    ACCU-CHEK GUIDE test strip USE TO TEST BLOOD SUGAR ONE TIME DAILY OR AS DIRECTED. 200 strip 4    blood glucose (NO BRAND SPECIFIED) lancets standard Use to test blood sugar one times daily or as directed. 100 Lancet 1    blood glucose monitoring (ACCU-CHEK FERMIN PLUS) meter device kit Use to test blood sugars one time daily or as directed. 1 kit 0    " blood glucose monitoring (ACCU-CHEK MULTICLIX) lancets Use to test blood sugar 1 times daily or as directed. 102 each 1    Blood Pressure Monitoring (BLOOD PRESSURE CUFF) MISC 1 Units daily 1 each 0    buPROPion (WELLBUTRIN XL) 150 MG 24 hr tablet Take 1 tablet (150 mg) by mouth every morning 90 tablet 1    Continuous Glucose Sensor (FREESTYLE FATOU 3 SENSOR) MISC 1 each every 14 days Use 1 sensor every 14 days. Use to read blood sugars per 's instructions. 6 each 5    fluticasone (FLONASE) 50 MCG/ACT nasal spray Spray 1-2 sprays into both nostrils daily 11.1 mL 2    hydrOXYzine HCl (ATARAX) 25 MG tablet TAKE 1 TABLET (25 MG) BY MOUTH 3 TIMES DAILY AS NEEDED FOR ANXIETY 90 tablet 0    insulin pen needle (32G X 4 MM) 32G X 4 MM miscellaneous Use 1 pen needles daily or as directed. 30 each 11    liraglutide (VICTOZA) 18 MG/3ML solution Inject 1.2 mg Subcutaneous daily 3 mL 2    lisinopril (ZESTRIL) 10 MG tablet Take 1 tablet (10 mg) by mouth daily 90 tablet 1    loratadine (CLARITIN) 10 MG tablet Take 1 tablet (10 mg) by mouth daily 90 tablet 1    metFORMIN (GLUCOPHAGE XR) 500 MG 24 hr tablet Take 4 tablets (2,000 mg) by mouth daily (with dinner) 120 tablet 2    mometasone-formoterol (DULERA) 100-5 MCG/ACT inhaler Inhale 2 puffs into the lungs 2 times daily 13 g 2    permethrin (ELIMITE) 5 % external cream Apply to clean, dry hair and leave on overnight or for 8-14 hours before washing off with water. 60 g 1    Skin Protectants, Misc. (EUCERIN) cream Apply topically 2 times daily as needed for dry skin 240 g 1       ASSESSMENT:    Pedro's biggest challenge from an MTM perspective is consistently taking his medications.  So, we focused on that today. I believe that he should continue all of his medications and sent in small refills of each medication, as Pedro requested, so that he would be able to  the medications at the pharmacy.     All medications were reviewed and found to be indicated,  effective, safe and convenient unless drug therapy problem identified as described above.     PLAN:    Pedro plans to  his medications at the pharmacy today or tomorrow.  Pedro plans to establish at the [facility]  Follow up in one month to continue to explore medication use and and    Options for treatment and/or follow-up care were reviewed with the patient. Pedro Funez was engaged and actively involved in the decision making process. He/She verbalized understanding of the options discussed and was satisfied with the final plan.     Patient was provided with written instructions/medication list via AVS.     BILLING:     Medical conditions reviewed: 5     Medications reviewed: 10     MTP identified: 1     Time spent: 30 minutes     Level of service: , nc

## 2024-10-08 NOTE — Clinical Note
Jet Vela - I had a visit with Pedro last week.  He is still having challenges taking medications and I think the last time he filled his medications was May.  He and I talked about this and he plans to get his medications today.  He is planning to transitioning to [facility] and I'm hoping to work together with their  to see if there is a way maybe that home nursing could visit with him weekly?  I think he recently got Medicare and so that might help? - Nuria

## 2024-10-15 ENCOUNTER — OFFICE VISIT (OUTPATIENT)
Dept: FAMILY MEDICINE | Facility: CLINIC | Age: 34
End: 2024-10-15
Payer: MEDICARE

## 2024-10-15 VITALS
SYSTOLIC BLOOD PRESSURE: 132 MMHG | RESPIRATION RATE: 17 BRPM | HEART RATE: 92 BPM | TEMPERATURE: 98.1 F | WEIGHT: 203.2 LBS | HEIGHT: 64 IN | DIASTOLIC BLOOD PRESSURE: 91 MMHG | BODY MASS INDEX: 34.69 KG/M2 | OXYGEN SATURATION: 97 %

## 2024-10-15 DIAGNOSIS — F43.9 STRESS: ICD-10-CM

## 2024-10-15 DIAGNOSIS — E11.65 TYPE 2 DIABETES MELLITUS WITH HYPERGLYCEMIA, WITHOUT LONG-TERM CURRENT USE OF INSULIN (H): Primary | ICD-10-CM

## 2024-10-15 LAB
EST. AVERAGE GLUCOSE BLD GHB EST-MCNC: 197 MG/DL
HBA1C MFR BLD: 8.5 %

## 2024-10-15 PROCEDURE — 83036 HEMOGLOBIN GLYCOSYLATED A1C: CPT | Mod: ORL | Performed by: NURSE PRACTITIONER

## 2024-10-15 ASSESSMENT — PAIN SCALES - GENERAL: PAINLEVEL: NO PAIN (0)

## 2024-10-15 NOTE — LETTER
October 16, 2024      Pedro READ Janiya Armin  [address] [place] [address]   [facility] [id number]        Dear Mr.Siegl Funez,    We are writing to inform you of your test results.    Test results indicate you may require additional follow up, see comment below.    Resulted Orders   Hemoglobin A1c   Result Value Ref Range    Estimated Average Glucose 197 (H) <117 mg/dL    Hemoglobin A1C 8.5 (H) <5.7 %      Comment:      Normal <5.7%   Prediabetes 5.7-6.4%    Diabetes 6.5% or higher     Note: Adopted from ADA consensus guidelines.     Jet Vasquez, there is some improvement in your 3 month blood sugar average, but it is still above 7 where we would like to see it. Keep taking medications regularly and it will likely come down. Continue to work on low carbohydrate diet, exercise and weight loss. Thank you.   If you have any questions or concerns, please call the clinic at the number listed above.       Sincerely,      JUAN ALBERTO Moncada CNP

## 2024-10-15 NOTE — PATIENT INSTRUCTIONS
Type 2 DM  Take medications every day  Continue to work on weight loss and healthy diet  Regular exercise  A1c today  Reestablish primary care  Open to talking with . Will refer to her: Miranda Culver   Goal blood sugar 130-150    High cholesterol  Low fat diet  Regular exercise    Hypertensive urgency   Asymptomatic high BP  Take meds regularly every morning  Diet and exercise as above  Repeat microalbumin in 6 months

## 2024-10-15 NOTE — NURSING NOTE
"ROOM:2  MYRNA MARTINEZ    Preferred Name: Pedro     Social Determinants of Health   SDoH screening reviewed today: Yes     Services Provided? No    34 year old  Chief Complaint   Patient presents with    RECHECK       Blood pressure (!) 138/102, pulse 92, temperature 98.1  F (36.7  C), temperature source Oral, resp. rate 17, height 1.631 m (5' 4.2\"), weight 92.2 kg (203 lb 3.2 oz), SpO2 97%. Body mass index is 34.66 kg/m .  BP completed using cuff size:        Patient Active Problem List   Diagnosis    Diabetes mellitus, type 2 (H)    Morbid obesity (H)    MDD (major depressive disorder), recurrent episode, moderate (H)    Intertrigo    GERD (gastroesophageal reflux disease)    REBA (generalized anxiety disorder)    Essential hypertension    Autism spectrum disorder    Chronic kidney disease, stage 1    Wheezing    Rash       Wt Readings from Last 2 Encounters:   10/15/24 92.2 kg (203 lb 3.2 oz)   07/23/24 92.7 kg (204 lb 6.4 oz)     BP Readings from Last 3 Encounters:   10/15/24 (!) 138/102   07/23/24 133/84   07/16/24 118/87       No Known Allergies    Current Outpatient Medications   Medication Sig Dispense Refill    ACCU-CHEK GUIDE test strip USE TO TEST BLOOD SUGAR ONE TIME DAILY OR AS DIRECTED. 200 strip 4    blood glucose (NO BRAND SPECIFIED) lancets standard Use to test blood sugar one times daily or as directed. 100 Lancet 1    blood glucose monitoring (ACCU-CHEK FERMIN PLUS) meter device kit Use to test blood sugars one time daily or as directed. 1 kit 0    blood glucose monitoring (ACCU-CHEK MULTICLIX) lancets Use to test blood sugar 1 times daily or as directed. 102 each 1    Blood Pressure Monitoring (BLOOD PRESSURE CUFF) MISC 1 Units daily 1 each 0    buPROPion (WELLBUTRIN XL) 150 MG 24 hr tablet Take 1 tablet (150 mg) by mouth every morning. 90 tablet 1    Continuous Glucose Sensor (FREESTYLE FATOU 3 SENSOR) MISC 1 each every 14 days Use 1 sensor every 14 days. Use to read blood " sugars per 's instructions. 6 each 5    fluticasone (FLONASE) 50 MCG/ACT nasal spray Spray 1-2 sprays into both nostrils daily. 11.1 mL 2    hydrOXYzine HCl (ATARAX) 25 MG tablet Take 1 tablet (25 mg) by mouth 3 times daily as needed for anxiety. 90 tablet 0    insulin pen needle (32G X 4 MM) 32G X 4 MM miscellaneous Use 1 pen needles daily or as directed. 30 each 11    liraglutide (VICTOZA) 18 MG/3ML solution Inject 1.2 mg subcutaneously daily. 3 mL 2    lisinopril (ZESTRIL) 10 MG tablet Take 1 tablet (10 mg) by mouth daily. 90 tablet 1    metFORMIN (GLUCOPHAGE XR) 500 MG 24 hr tablet Take 4 tablets (2,000 mg) by mouth daily (with dinner). 120 tablet 2    mometasone-formoterol (DULERA) 100-5 MCG/ACT inhaler Inhale 2 puffs into the lungs 2 times daily. 13 g 2    Skin Protectants, Misc. (EUCERIN) cream Apply topically 2 times daily as needed for dry skin 240 g 1     No current facility-administered medications for this visit.       Social History     Tobacco Use    Smoking status: Former     Current packs/day: 0.00     Types: Cigarettes     Quit date: 2016     Years since quittin.7     Passive exposure: Never    Smokeless tobacco: Current     Types: Chew    Tobacco comments:     Chewing tobacco    Vaping Use    Vaping status: Every Day    Substances: Nicotine    Passive vaping exposure: Yes   Substance Use Topics    Alcohol use: Yes     Comment: 1 beer periodically. Has binged in the past    Drug use: No       Honoring Choices - Health Care Directive Guide offered to patient at time of visit.    Health Maintenance Due   Topic Date Due    ASTHMA ACTION PLAN  Never done    ADVANCE CARE PLANNING  Never done    DEPRESSION ACTION PLAN  Never done    HEPATITIS A IMMUNIZATION (1 of 2 - Risk 2-dose series) Never done    MEDICARE ANNUAL WELLNESS VISIT  2022    DIABETIC FOOT EXAM  2024    HEPATITIS B IMMUNIZATION (2 of 3 - 19+ 3-dose series) 2024    INFLUENZA VACCINE (1) 2024    COVID-19  "Vaccine (3 - 2024-25 season) 09/01/2024    A1C  10/16/2024    PHQ-9  10/16/2024       Immunization History   Administered Date(s) Administered    COVID-19 Monovalent 12+ (Pfizer 2022) 01/25/2022    COVID-19 Vaccine (Miah) 05/08/2021    Hepatitis B, Adult 07/16/2024    Historical DTP/aP 1988, 1988, 02/01/1989, 04/10/1990, 09/01/1993    Influenza Vaccine >6 months,quad, PF 09/25/2016, 10/23/2019, 10/04/2022, 10/10/2023    Influenza, seasonal, injectable, PF 10/23/2019    MMR 04/10/1990, 04/26/2000    Pneumococcal 20 valent Conjugate (Prevnar 20) 07/16/2024    Polio, Unspecified 1988, 1988, 04/10/1990, 09/01/1993    Poliovirus, inactivated (IPV) 1988, 1988, 04/10/1990, 09/01/1993    TDAP Vaccine (Adacel) 03/22/2019    Td (Adult), Adsorbed 04/26/2000       No results found for: \"PAP\"    Recent Labs   Lab Test 07/16/24  1442 04/25/24  1424 04/16/24  1535 01/16/24  1520 07/11/23  1517 04/11/23  1619 12/14/21  1618 08/17/21  1452   A1C 10.2*  --  8.5* 9.9*   < > 11.0*   < > 6.9   *  --  133* 189*   < > 158*   < > 124*   HDL 32*  --  47 43   < > 34*   < > 42   TRIG 197*  --  309* 269*   < > 289*   < > 119   ALT 57  --   --   --   --  34  --  40   CR 0.72 0.95  --   --   --  0.89   < > 0.75   GFRESTIMATED >90 >90  --   --   --  >90   < > >90   ALBUMIN 4.4  --   --   --   --  4.6  --  4.0   POTASSIUM 4.7 4.4  --   --   --  4.9   < > 4.1   TSH  --   --   --   --   --   --   --  1.39    < > = values in this interval not displayed.           10/15/2024     1:36 PM 7/16/2024     1:08 PM   PHQ-2 ( 1999 Pfizer)   Q1: Little interest or pleasure in doing things 1 1   Q2: Feeling down, depressed or hopeless 0 0   PHQ-2 Score 1 1   Q1: Little interest or pleasure in doing things Several days Several days   Q2: Feeling down, depressed or hopeless Not at all Not at all   PHQ-2 Score 1 1           6/14/2022     2:09 PM 10/4/2022     2:36 PM 10/4/2022     2:42 PM 4/16/2024     1:56 PM   PHQ-9 " SCORE   PHQ-9 Total Score MyChart    4 (Minimal depression)   PHQ-9 Total Score 6 4 4 4           9/7/2021    11:28 AM 10/4/2022     2:42 PM 4/16/2024     1:56 PM   REBA-7 SCORE   Total Score   5 (mild anxiety)   Total Score 2 3 5           4/16/2024     1:57 PM 6/18/2024     2:29 PM   ACT Total Scores   ACT TOTAL SCORE (Goal Greater than or Equal to 20) 21 23   In the past 12 months, how many times did you visit the emergency room for your asthma without being admitted to the hospital? 0 0   In the past 12 months, how many times were you hospitalized overnight because of your asthma? 0 0       Radha Raymond, EMT    October 15, 2024 1:43 PM

## 2024-10-15 NOTE — PROGRESS NOTES
HPI       Pedro Funez is a 34 year old  who presents for   Chief Complaint   Patient presents with    RECHECK     34 year-old male with history Type 2 DM, MDD, Anxiety and HTN presents for follow up. Has been noncompliant with medications (see pharmacy note) but has now restarted medications. Has been taking them sporadically.  On liraglutide and Metformin.  Discussed getting CGM for him to better evaluate BS, but insurance will not cover at this time. He will be changing clinics since this clinic is closing and pharmacist and I discussed getting RN services to help with medications.  Last A1c 10.2 on 7/16/2024. Due today. BS review at home: not checking.  Was previously put on meds for allergies, and reactive airway. He thought these helped him but was not taking regularly.   Chart review indicates pharmacist refilled all of these.    Still very stressed regarding living situation. Not really interested in moving at this time. Has noted increased thirst and urination but disappeared when resumed medications. States not taking meds is a combination of several things: sometimes runs out or gets low so starts to save meds to make them last longer. Has spent some time at friend's house and forgets to bring meds.  Says he might be open to having RN come and set up meds.   Diet: going to food shelves, some [place], burger, ramen, trying to eat vegetables.   Weight is down a little. Has been walking periodically. Did 32412 steps yesterday. Average per day 8500 steps.   Problem, Medication and Allergy Lists were reviewed and updated if needed..    Patient is an established patient of this clinic..         Review of Systems:   Review of Systems  GEN: no fever or chills. Some weight loss  ENDO: increased thirst and urination.   CV: negative CP, irregular heart beat  RESP: got refills of inhalers, using again. Had a little wheezing before resumed.   NEURO: negative headache, dizziness  MOOD: Expresses concern  about subsequent healthcare.         Physical Exam:   There were no vitals filed for this visit.  There is no height or weight on file to calculate BMI.  Vital signs normal except BP elevated     Physical Exam  GEN: Alert, appropriate NAD  EYEs: edie  CV: HRRR, S1, S2, no MRG  LUNGS: CTA with air entry throughout  Feet: callouses heels and balls of feet. Sensation intact to monofilament. No lesions. Toenails intact  MOOD: teary at times.       Results:   Results are ordered and pending    Assessment and Plan        1. Type 2 diabetes mellitus with hyperglycemia, without long-term current use of insulin (H)  Recheck A1c today. Has lost some weight and encouraged to continue working on diet and exercise, recent increase in activity level.  Discussed med in-adherence. Says he might be open to RN helping set up meds. Explored barriers to adherence: would like do best with once a day. Because of spending night other places, will need to carry meds with him so he won't forget. - Hemoglobin A1c; Future  - Hemoglobin A1c    2. Stress  Living situation stress, but uncertain that he wants to move. He is also running out of food at the end of the month and relying on food shelves. He reports he had to spend $40 yesterday on copays for prescriptions so now has no money left.  He is open to referral to SW. Is meeting with building worker tomorrow re: elevators not working, took a year to repair heating and AC. Referred to [facility]     There are no discontinued medications.    Options for treatment and follow-up care were reviewed with the patient. Pedro Funez  engaged in the decision making process and verbalized understanding of the options discussed and agreed with the final plan.    JUAN ALBERTO Moncada CNP

## 2024-10-29 ENCOUNTER — OFFICE VISIT (OUTPATIENT)
Dept: FAMILY MEDICINE | Facility: CLINIC | Age: 34
End: 2024-10-29
Payer: MEDICARE

## 2024-10-29 VITALS
SYSTOLIC BLOOD PRESSURE: 129 MMHG | BODY MASS INDEX: 33.49 KG/M2 | HEIGHT: 65 IN | TEMPERATURE: 98 F | RESPIRATION RATE: 14 BRPM | HEART RATE: 93 BPM | WEIGHT: 201 LBS | OXYGEN SATURATION: 97 % | DIASTOLIC BLOOD PRESSURE: 86 MMHG

## 2024-10-29 DIAGNOSIS — Z00.00 WELLNESS EXAMINATION: Primary | ICD-10-CM

## 2024-10-29 DIAGNOSIS — Z76.89 ESTABLISHING CARE WITH NEW DOCTOR, ENCOUNTER FOR: ICD-10-CM

## 2024-10-29 DIAGNOSIS — Z23 NEED FOR PROPHYLACTIC VACCINATION AND INOCULATION AGAINST INFLUENZA: ICD-10-CM

## 2024-10-29 DIAGNOSIS — Z91.148 NONCOMPLIANCE WITH MEDICATION REGIMEN: ICD-10-CM

## 2024-10-29 DIAGNOSIS — Z23 HIGH PRIORITY FOR 2019-NCOV VACCINE: ICD-10-CM

## 2024-10-29 PROBLEM — R21 RASH: Status: RESOLVED | Noted: 2024-06-18 | Resolved: 2024-10-29

## 2024-10-29 PROBLEM — N18.1 CHRONIC KIDNEY DISEASE, STAGE 1: Status: RESOLVED | Noted: 2023-04-18 | Resolved: 2024-10-29

## 2024-10-29 SDOH — HEALTH STABILITY: PHYSICAL HEALTH: ON AVERAGE, HOW MANY DAYS PER WEEK DO YOU ENGAGE IN MODERATE TO STRENUOUS EXERCISE (LIKE A BRISK WALK)?: 1 DAY

## 2024-10-29 ASSESSMENT — ANXIETY QUESTIONNAIRES
GAD7 TOTAL SCORE: 4
GAD7 TOTAL SCORE: 4
4. TROUBLE RELAXING: SEVERAL DAYS
3. WORRYING TOO MUCH ABOUT DIFFERENT THINGS: SEVERAL DAYS
8. IF YOU CHECKED OFF ANY PROBLEMS, HOW DIFFICULT HAVE THESE MADE IT FOR YOU TO DO YOUR WORK, TAKE CARE OF THINGS AT HOME, OR GET ALONG WITH OTHER PEOPLE?: NOT DIFFICULT AT ALL
1. FEELING NERVOUS, ANXIOUS, OR ON EDGE: SEVERAL DAYS
2. NOT BEING ABLE TO STOP OR CONTROL WORRYING: SEVERAL DAYS
IF YOU CHECKED OFF ANY PROBLEMS ON THIS QUESTIONNAIRE, HOW DIFFICULT HAVE THESE PROBLEMS MADE IT FOR YOU TO DO YOUR WORK, TAKE CARE OF THINGS AT HOME, OR GET ALONG WITH OTHER PEOPLE: NOT DIFFICULT AT ALL
7. FEELING AFRAID AS IF SOMETHING AWFUL MIGHT HAPPEN: NOT AT ALL
7. FEELING AFRAID AS IF SOMETHING AWFUL MIGHT HAPPEN: NOT AT ALL
GAD7 TOTAL SCORE: 4
5. BEING SO RESTLESS THAT IT IS HARD TO SIT STILL: NOT AT ALL
6. BECOMING EASILY ANNOYED OR IRRITABLE: NOT AT ALL

## 2024-10-29 ASSESSMENT — PATIENT HEALTH QUESTIONNAIRE - PHQ9
SUM OF ALL RESPONSES TO PHQ QUESTIONS 1-9: 6
10. IF YOU CHECKED OFF ANY PROBLEMS, HOW DIFFICULT HAVE THESE PROBLEMS MADE IT FOR YOU TO DO YOUR WORK, TAKE CARE OF THINGS AT HOME, OR GET ALONG WITH OTHER PEOPLE: NOT DIFFICULT AT ALL
SUM OF ALL RESPONSES TO PHQ QUESTIONS 1-9: 6

## 2024-10-29 ASSESSMENT — SOCIAL DETERMINANTS OF HEALTH (SDOH): HOW OFTEN DO YOU GET TOGETHER WITH FRIENDS OR RELATIVES?: ONCE A WEEK
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
## 2024-11-05 ENCOUNTER — OFFICE VISIT (OUTPATIENT)
Dept: FAMILY MEDICINE | Facility: CLINIC | Age: 34
End: 2024-11-05
Payer: MEDICARE

## 2024-11-05 DIAGNOSIS — E11.9 DIABETES MELLITUS (H): Primary | ICD-10-CM

## 2024-11-05 NOTE — Clinical Note
Jet Dutta - Just an FYI that I met with Pedro in November.  Queenie was able to connect with him at that time and I think will be a really important resource.  I've been working with Pedro for a while on medication adherence, but there's a disconnect. Queenie has some ideas around getting some support for Pedro at his home and I think that will be most important for him now.  Pedro and I did work on setting up some phone reminders for him. I'll have another visit with him in January. - Nuria

## 2024-11-05 NOTE — PROGRESS NOTES
SUBJECTIVE: Pedro Funez is a 34 year old male who was referred by Tra Gama for [facility] services for medication management.    Diabetes  Metformin  Liraglutide    Patient reports taking metformin and liraglutide about once a week. He has a hard time remembering to take all of his medications. He reports recently having a bit more of an upset stomach. Additionally, he has been more hesitant to take his diabetes medications for concern for symptoms of low blood sugar. He reports that recently he took his metformin dose (2000 mg with dinner) and was feeling shaky, sweaty, and like he had the chills; he checked is blood sugar and saw it is about 80 mg/dL so he ate to bring that number back up. Feeling symptoms of low blood sugar is worrisome to him because he doesn't always have food at home. Patient mentioned that his monthly check is late (should get on the 3rd) and when it has been late before, it has not come until the 24th of the month. He only has a few meals left.    Medication Management  He reports not taking his other medications as often as he should but was not able to quantify the frequency. He mentioned that in the past it was helpful to have alarms on his phone to remind him. Though, since then, he got a new phone and didn't set the same alarms. Patient also reported that he is going through the process of getting his apartment organized. He thinks it may be helpful to have a dedicated place for his medication that he always sees, but that this may not be helpful until the rest of his apartment is in order.    The patient has also had some trouble getting medication for free. He reports that the last time he went to [facility], they would not wave the charge.    Environmental factors that impact patient: see above    Patient reports being noncompliant much of the time     Preferred Pharmacy:    [facility] [place], [place] - [address]  [facility] [phone]  [facility] - [place], [place] - [address]      OBJECTIVE:    Patient Active Problem List   Diagnosis    Diabetes mellitus, type 2 (H)    Morbid obesity (H)    MDD (major depressive disorder), recurrent episode, moderate (H)    GERD (gastroesophageal reflux disease)    REBA (generalized anxiety disorder)    Essential hypertension    Autism spectrum disorder    Wheezing        Current Outpatient Medications   Medication Sig Dispense Refill    buPROPion (WELLBUTRIN XL) 150 MG 24 hr tablet Take 1 tablet (150 mg) by mouth every morning. 90 tablet 1    hydrOXYzine HCl (ATARAX) 25 MG tablet Take 1 tablet (25 mg) by mouth 3 times daily as needed for anxiety. 90 tablet 0    liraglutide (VICTOZA) 18 MG/3ML solution Inject 1.2 mg subcutaneously daily. 3 mL 2    lisinopril (ZESTRIL) 10 MG tablet Take 1 tablet (10 mg) by mouth daily. 90 tablet 1    metFORMIN (GLUCOPHAGE XR) 500 MG 24 hr tablet Take 4 tablets (2,000 mg) by mouth daily (with dinner). 120 tablet 2    ACCU-CHEK GUIDE test strip USE TO TEST BLOOD SUGAR ONE TIME DAILY OR AS DIRECTED. 200 strip 4    blood glucose (NO BRAND SPECIFIED) lancets standard Use to test blood sugar one times daily or as directed. 100 Lancet 1    blood glucose monitoring (ACCU-CHEK FERMIN PLUS) meter device kit Use to test blood sugars one time daily or as directed. 1 kit 0    blood glucose monitoring (ACCU-CHEK MULTICLIX) lancets Use to test blood sugar 1 times daily or as directed. 102 each 1    Blood Pressure Monitoring (BLOOD PRESSURE CUFF) MISC 1 Units daily 1 each 0    fluticasone (FLONASE) 50 MCG/ACT nasal spray Spray 1-2 sprays into both nostrils daily. 11.1 mL 2    insulin pen needle (32G X 4 MM) 32G X 4 MM miscellaneous Use 1 pen needles daily or as directed. 30 each 11    mometasone-formoterol (DULERA) 100-5 MCG/ACT inhaler Inhale 2 puffs into the lungs 2 times daily. 13 g 2    Skin Protectants, Misc. (EUCERIN) cream Apply topically 2  times daily as needed for dry skin 240 g 1       ASSESSMENT:    Diabetes/Medication Management  Patient's a1c is not at goal < 7%. Patient is experiencing some side effects from his diabetes medications; however, these may be avoided with consistent medication use resulting in less volatile blood sugars. Additionally, it may help the patient's tolerability and consistency of medication use if his metformin dose is temporarily decreased and retitrated up to 2000 mg daily.   Pedro's biggest challenge from an MTM perspective is consistently taking all his medications. He would benefit from setting up a method to remind him to take medications consistently. He would also benefit from a visit with a  in an effort to limit his barriers to healthcare and improve food access.    All medications were reviewed and found to be indicated, effective, safe and convenient unless drug therapy problem identified as described above.     PLAN:    Your blood sugar has improved as of your last lab, which is great!  Take 2 metformin pills for 2 weeks (1000 mg daily) then increase your dose to 4 pills thereafter (2000 mg daily).  Our plan is for you to use your alarm system (every night at 9 pm) to remind you to take your medications. You can take all of your medications at this time.  I would like for you to meet with Queenie, our , to see about resources for medication cost, food access, etc.  We will meet again at [place] on January 16 at 1:30.       Medication issues to be addressed at a future visit:  Reassess preference for daily versus weekly injectable   Assess metformin dose and potential need for titration    Options for treatment and/or follow-up care were reviewed with the patient. Pedro Funez was engaged and actively involved in the decision making process. He/She verbalized understanding of the options discussed and was satisfied with the final plan.     Patient was provided with written  instructions/medication list via AVS.     BILLING:     Medical conditions reviewed:      Medications reviewed:      MTP identified:      Time spent:      Level of service:     Billing   New patient to the pharmacist or > 1 year since last visit, initial 15 min [id number]   Established patient, initial 15 min [id number]   Each additional 15 min [id number]     Level Medical Problems Medications Drug Therapy Problems 72665/01760 AND   1 >=1 >=1 None -   2 >=1 >=2 1 50433   3 >=2 >=3-5 2 99607 x2   4 >=3 >=6-8 3 99607 x3   5 >=4 >=9 4 99607 x4

## 2024-11-05 NOTE — PATIENT INSTRUCTIONS
Your blood sugar has improved as of your last lab, which is great!    Our plan is for you to use your alarm system (every night at 9 pm) to remind you to take medications. You can take all of your medications at this time.    I would like for you to meet with Queenie, our , to see about resources for medication cost, food access, etc.    We will meet again at [place] on January 16 at 1:30.

## 2024-11-21 ENCOUNTER — TELEPHONE (OUTPATIENT)
Dept: FAMILY MEDICINE | Facility: CLINIC | Age: 34
End: 2024-11-21

## 2024-11-21 NOTE — TELEPHONE ENCOUNTER
Social Work - Follow-Up      Data/Intervention: 2024    Patient Name: Pedro Funez Goes By: Pedro    /Age: 1990 (34 year old)    Reason for Follow-Up:  Called pt to report that [facility] was called  for a CADI assessment referral.  SW explained that Lorie Morrison will call within three to four months to schedule an assessment.    SW also stated that [facility] referral was sent today.     Intervention/Education/Resources Provided:  [facility] Service Provider is [facility] (ph# [phone])    Assessment/Plan:  Patient to follow up with [facility] and Lorie Morrison when contacted.    JEYSON Cardenas, LGSW  [facility]

## 2024-11-26 NOTE — PROGRESS NOTES
11/5/2024: SW met with pt after his med mgmt. appt.   Pt receives SSDI of about $1080 per month ($300 rent, $80 wifi). Supposed to get his check the 3rd of each month but it has been later several times. Pt lives in subsidized housing and has been in the same housing for the last six years. Prior to his current apartment, pt stated he experienced housing instability. Pt states he has been deemed disabled due to anxiety and autism. Pt did not provide any additional info re: autism or anxiety or how long he has been on SSDI.  Pt states at times he struggles with eating the right food and maintaining his home. SW discussed CADI and pt was interested and states he has been wondering if he qualifies for that program. Pt also would like to know if he could get a PCA.  SW explained the PCA program and stated Lorie Morrison will assess for PCA services as well as CADI.  SW will make referral to [facility].  SW and pt discussed food resources as pt indicates he experiences food insecurity several times a month.  Pt aware of local food shelves and free meals and accesses when needed.   At present time, pt reports having no  or anyone who provides consistent and on-going services/contact.   Pt's primary concern is not having enough money to pay for his prescriptions. Pt states sometimes the copay is over $30 and that would cut into his food and basic needs money.   SW will look for Rx assistance and follow up with patient.  Pt and SW also discussed mental health and [facility]. Pt is interested in [facility] services and SW will make referral.     JEYSON Cardenas, LGSW  [facility]

## 2024-12-31 ENCOUNTER — MYC MEDICAL ADVICE (OUTPATIENT)
Dept: FAMILY MEDICINE | Facility: CLINIC | Age: 34
End: 2024-12-31

## 2025-01-16 ENCOUNTER — OFFICE VISIT (OUTPATIENT)
Dept: FAMILY MEDICINE | Facility: CLINIC | Age: 35
End: 2025-01-16
Payer: MEDICARE

## 2025-01-16 DIAGNOSIS — E11.9 DIABETES MELLITUS, TYPE 2 (H): ICD-10-CM

## 2025-01-16 DIAGNOSIS — E11.9 DIABETES MELLITUS, TYPE 2 (H): Primary | ICD-10-CM

## 2025-01-16 LAB
EST. AVERAGE GLUCOSE BLD GHB EST-MCNC: 206 MG/DL
HBA1C MFR BLD: 8.8 % (ref 0–5.6)

## 2025-01-16 RX ORDER — LIRAGLUTIDE 6 MG/ML
1.8 INJECTION SUBCUTANEOUS DAILY
Qty: 9 ML | Refills: 2 | Status: SHIPPED | OUTPATIENT
Start: 2025-01-16

## 2025-01-16 NOTE — PATIENT INSTRUCTIONS
- Please schedule an appointment with Dr. Gama. In that appointment you can discuss possible sleep apnea  - Starting with your next dose of Victoza, please take 1.8 mg every day  - We talked about different approaches to what you eat and are excited to hear what you try at our next visit.

## 2025-01-16 NOTE — PROGRESS NOTES
SUBJECTIVE: Pedro Funez is a 34 year old male who was referred by Tra Gama for [facility] services for diabetes management.    Diabetes:  Confirmed taking Victoza 1.2 mg daily and metformin ER 2000 mg daily. He feels is missing fewer doses now that he has an alarm set on his phone. He estimates he misses about 1 dose per week when he's busy or not at home. He may forget Victoza more often than other medications as he leaves it in the refrigerator. Does not check blood glucose at home. Will occasional check if he feels blood glucose is low, but this is not common. Does not have batteries for glucometer so has not been using recently. Knows what to do if he experiences hypoglycemia. Asking about dietary recommendations and reversing diabetes.    Asthma:   Patient denies breathing difficulties throughout the day and feels these occur at night while sleeping. He has been told by friends and family that he may have sleep apnea given snoring. Was initially prescribed Dulera to be used before bed but has not been doing this as it has not been particularly helpful. He thinks this was used initially to provide quick relief of symptoms.    HTN:  Continues on lisinopril 10 mg daily and denies side effects. Does not take blood pressure at home.    Anxiety/Depression:  Feels bupropion is helpful and denies side effects. Has not needed hydroxyzine lately and feels things are going well overall.    Compliance:  Missing about 1 dose per week of each medication.    Preferred Pharmacy:    [facility] [place], [place] - [address] [facility] [phone]  [facility] [place], [place] [facility] [address]     OBJECTIVE:    Patient Active Problem List   Diagnosis    Diabetes mellitus, type 2 (H)    Morbid obesity (H)    MDD (major depressive disorder), recurrent episode, moderate (H)    GERD (gastroesophageal reflux disease)    REBA (generalized  anxiety disorder)    Essential hypertension    Autism spectrum disorder    Wheezing        Current Outpatient Medications   Medication Sig Dispense Refill    ACCU-CHEK GUIDE test strip USE TO TEST BLOOD SUGAR ONE TIME DAILY OR AS DIRECTED. 200 strip 4    blood glucose (NO BRAND SPECIFIED) lancets standard Use to test blood sugar one times daily or as directed. 100 Lancet 1    blood glucose monitoring (ACCU-CHEK FERMIN PLUS) meter device kit Use to test blood sugars one time daily or as directed. 1 kit 0    blood glucose monitoring (ACCU-CHEK MULTICLIX) lancets Use to test blood sugar 1 times daily or as directed. 102 each 1    Blood Pressure Monitoring (BLOOD PRESSURE CUFF) MISC 1 Units daily 1 each 0    buPROPion (WELLBUTRIN XL) 150 MG 24 hr tablet Take 1 tablet (150 mg) by mouth every morning. 90 tablet 1    fluticasone (FLONASE) 50 MCG/ACT nasal spray Spray 1-2 sprays into both nostrils daily. 11.1 mL 2    hydrOXYzine HCl (ATARAX) 25 MG tablet Take 1 tablet (25 mg) by mouth 3 times daily as needed for anxiety. 90 tablet 0    insulin pen needle (32G X 4 MM) 32G X 4 MM miscellaneous Use 1 pen needles daily or as directed. 30 each 11    liraglutide (VICTOZA) 18 MG/3ML solution Inject 1.2 mg subcutaneously daily. 3 mL 2    lisinopril (ZESTRIL) 10 MG tablet Take 1 tablet (10 mg) by mouth daily. 90 tablet 1    metFORMIN (GLUCOPHAGE XR) 500 MG 24 hr tablet Take 4 tablets (2,000 mg) by mouth daily (with dinner). 120 tablet 2    mometasone-formoterol (DULERA) 100-5 MCG/ACT inhaler Inhale 2 puffs into the lungs 2 times daily. 13 g 2    Skin Protectants, Misc. (EUCERIN) cream Apply topically 2 times daily as needed for dry skin 240 g 1       ASSESSMENT:    Diabetes:  A1c above goal of <7% by POC test. Patient has been more adherent to therapy and has a better routine for remembering medications. Considering improved adherence and history of poor adherence, would recommend increasing liraglutide dose to minimize schedule  changes. Reviewed high protein foods, vegetables, and limiting carbohydrates. Reviewed exercise options and aiming for 150 minutes of exercise per week. Discussed diabetes as a chronic illness.  Medication therapy problem: dose too low    Asthma:  Patient description of symptoms may be related to sleep disorder like sleep apnea. Refer to PCP for workup and diagnosis. Dulera is not necessary in the absence of asthma symptoms throughout the day.  Medication therapy problem: unnecessary medication    HTN:  Blood pressure historically near goal of <130/80. Does not monitor at home and no blood pressure obtained today so continue to monitor.    Anxiety/Depression:  Well controlled on bupropion daily and hydroxyzine as needed.      All medications were reviewed and found to be indicated, effective, safe and convenient unless drug therapy problem identified as described above.     PLAN:    Increase Victoza to 1.8 mg subcutaneously daily.  Discontinue Dulera inhaler.  Visit with Dr. Gama on 1/28 as planned and discuss symptoms consistent with sleep apnea.      Medication issues to be addressed at a future visit:  Consider switching to more potent GLP-1 agonist pending glycemic control.  Asthma symptoms following Dulera discontinuation.     Options for treatment and/or follow-up care were reviewed with the patient. Pedro Funez was engaged and actively involved in the decision making process. He/She verbalized understanding of the options discussed and was satisfied with the final plan.     Patient was provided with written instructions/medication list via AVS.     BILLING:     Medical conditions reviewed: 4     Medications reviewed: 6     MTP identified: 2     Time spent: 30 minutes     Level of service: 3    Alcon Levine, P4 Student  [employer]  --  I was present with the pharmacy student who  participated in the service and in the  documentation of this note. I have verified  the  history, personally performed the medical decision  making, and have verified the content of the note,  which accurately reflects my assessment of the  patient and the plan of care. Nuria Avitia, JanyD

## 2025-01-16 NOTE — Clinical Note
Jet Dutta - I met with Pedro last week and it seems that the cell phone alarms we set up on his phone have been helping.  Interestingly, he feels like he'll do better remembering a daily glp1 versus a weekly GLP1, so stuck with that, but increased dose of Victoza.  Also, he mentioned that he has some symptoms that he think are consistent with sleep apnea.  I encouraged him to talk with you about this in a future appointment and it sounds like he's set to see you next week. If he does have UNIQUE, that might further push us towards a more effective GLP1 like tirzepatide (even though it's once weekly).. . - Nuria

## 2025-01-28 ENCOUNTER — OFFICE VISIT (OUTPATIENT)
Dept: FAMILY MEDICINE | Facility: CLINIC | Age: 35
End: 2025-01-28
Payer: MEDICARE

## 2025-01-28 ENCOUNTER — TELEPHONE (OUTPATIENT)
Dept: FAMILY MEDICINE | Facility: CLINIC | Age: 35
End: 2025-01-28
Payer: MEDICARE

## 2025-01-28 VITALS
HEIGHT: 65 IN | OXYGEN SATURATION: 97 % | DIASTOLIC BLOOD PRESSURE: 92 MMHG | TEMPERATURE: 97 F | BODY MASS INDEX: 35.49 KG/M2 | SYSTOLIC BLOOD PRESSURE: 128 MMHG | HEART RATE: 98 BPM | RESPIRATION RATE: 14 BRPM | WEIGHT: 213 LBS

## 2025-01-28 DIAGNOSIS — G47.33 OSA (OBSTRUCTIVE SLEEP APNEA): Primary | ICD-10-CM

## 2025-01-28 ASSESSMENT — ANXIETY QUESTIONNAIRES
2. NOT BEING ABLE TO STOP OR CONTROL WORRYING: NOT AT ALL
GAD7 TOTAL SCORE: 3
IF YOU CHECKED OFF ANY PROBLEMS ON THIS QUESTIONNAIRE, HOW DIFFICULT HAVE THESE PROBLEMS MADE IT FOR YOU TO DO YOUR WORK, TAKE CARE OF THINGS AT HOME, OR GET ALONG WITH OTHER PEOPLE: NOT DIFFICULT AT ALL
1. FEELING NERVOUS, ANXIOUS, OR ON EDGE: SEVERAL DAYS
7. FEELING AFRAID AS IF SOMETHING AWFUL MIGHT HAPPEN: NOT AT ALL
5. BEING SO RESTLESS THAT IT IS HARD TO SIT STILL: NOT AT ALL
3. WORRYING TOO MUCH ABOUT DIFFERENT THINGS: NOT AT ALL
GAD7 TOTAL SCORE: 3
GAD7 TOTAL SCORE: 3
6. BECOMING EASILY ANNOYED OR IRRITABLE: SEVERAL DAYS
4. TROUBLE RELAXING: SEVERAL DAYS
8. IF YOU CHECKED OFF ANY PROBLEMS, HOW DIFFICULT HAVE THESE MADE IT FOR YOU TO DO YOUR WORK, TAKE CARE OF THINGS AT HOME, OR GET ALONG WITH OTHER PEOPLE?: NOT DIFFICULT AT ALL
7. FEELING AFRAID AS IF SOMETHING AWFUL MIGHT HAPPEN: NOT AT ALL

## 2025-01-28 ASSESSMENT — PATIENT HEALTH QUESTIONNAIRE - PHQ9
10. IF YOU CHECKED OFF ANY PROBLEMS, HOW DIFFICULT HAVE THESE PROBLEMS MADE IT FOR YOU TO DO YOUR WORK, TAKE CARE OF THINGS AT HOME, OR GET ALONG WITH OTHER PEOPLE: NOT DIFFICULT AT ALL
SUM OF ALL RESPONSES TO PHQ QUESTIONS 1-9: 3
SUM OF ALL RESPONSES TO PHQ QUESTIONS 1-9: 3

## 2025-01-28 NOTE — PROGRESS NOTES
"  Assessment & Plan     Evaluation for UNIQUE  Pt meets criteria through history (including snoring, self-reported apneic episodes, difficulty falling/staying asleep, hx HTN, male, elevated BMI) and physical exam today for further evaluation for UNIQUE with referral to sleep medicine. Discussed this with patient who is interested in obtaining further assessment.   - Referral to sleep medicine placed today.    Housing Options   Pt currently has safe, stable housing, though current apartment is not optimal for him to sleep and rest due to increased disruptions in the building recently. Wonders about support/resources through [facility] and had previously been working with Miranda Culver (MSW, LGSW).   - Recommended continuing to work with current   - [facility] team reached out to Shaw Suresh who works at [facility]; if possible, asked if they could reach out to pt to assist with this process as well. Pt is aware that someone may reach out about this.    The longitudinal plan of care for the diagnosis(es)/condition(s) as documented were addressed during this visit. Due to the added complexity in care, I will continue to support Pedro in the subsequent management and with ongoing continuity of care.    I was present with the medical student who participated in the service and in the documentation of the note. I have verified the history and personally performed the physical exam and medical decision making. I agree with the assessment and plan of care as documented in the note.     Tra Gama MD  4:57 PM, January 28, 2025     Subjective   Pedro is a 34 year old, presenting for the following health issues:  Sleep Problem (Snoring and trouble sleeping. Feels like not sleeping well, getting rested. Ongoing. )    HPI   \"Possible sleep apnea\"    Pedro is a 34 year old male who presents to clinic today to discuss \"possible sleep apnea.\" Pt reports lifetime hx of family/friends telling him he would snoring, waking up " throughout the night since childhood. Others have mentioned they have heard him snoring from rooms down the wise. Endorses trouble falling asleep and staying asleep. Pt reports own hx that he feels like he will have episodes overnight where he feels like he stops breathing when he wakes up gasping for air.     - Hx HTN (on 10 mg lisinopril daily)  - Hx tobacco use (previously smoked cigarettes 0.5-1 pack for 2 years); now for 5-6 years using chewing tobacco. One tin lasts a week. No vaping, substance use, or alcohol use.   - No known family hx UNIQUE  - Hx DM (on 2000 mg metformin and liraglutide)   - Pt states asthma diagnosis is unclear and wonders if weight may be related; feels that breathing issues have been associated with weight gain. Recently discontinued use of Dulera inhaler  - Responds yes to snoring (per pt  and others in same room or down the wise)  - Responds yes to episodes of choking/gasping/apnea (per pt)  - Responds yes to difficulty staying asleep throughout the night  - Noticed migraines associated with lack of sleep/anxiety; the headaches onset any time of day but are only relieved with sleep. Has had difficulties being able to sleep in current apartment due to other residents knocking on his door throughout the night or with smoke detector alarms. He has a hx homelessness; has had stable housing for 6.5 years and has worked with case management to maintain current housing. He has faced barriers in moving forward with their team in looking for new housing options. Has had increased stress related to housing and sleep lately and wonders what options may be available through the clinic.     STOP BANG Questionnaire   There is no height or weight on file to calculate BMI.  Neck circumference: Elevated  1. Snoring   Do you snore loudly (louder than talking or loud enough to be heard   through closed doors)? Yes  2. Tired   Do you often feel tired, fatigued, or sleepy during daytime? Yes  3. Observed  "  Has anyone observed you stop breathing during your sleep? Not that he is aware of; endorses noticing it himself  4. Blood pressure   Do you have or are you being treated for high blood pressure? Yes; pt takes lisinopril daily  5. BMI   BMI more than 35 kg/m2? Yes; BMI today 35.62  6. Age   Age over 50 yr old? No  7. Neck circumference   Neck circumference greater than 40 cm? Elevated neck circumference  8. Gender   Gender male? Yes    High risk of UNIQUE: answering yes to three or more items         Review of Systems  Constitutional, HEENT, cardiovascular, pulmonary, gi and gu systems are negative, except as otherwise noted.      Objective    BP (!) 128/92 (BP Location: Left arm, Patient Position: Sitting, Cuff Size: Adult Regular)   Pulse 98   Temp 97  F (36.1  C) (Skin)   Resp 14   Ht 1.647 m (5' 4.84\")   Wt 96.6 kg (213 lb)   SpO2 97%   BMI 35.62 kg/m    Body mass index is 35.62 kg/m .    Physical Exam   General: Alert and fully and appropriately responds to questions. Ambulates with ease with no noted abnormalities in gait.  HEENT: No observed lesions on the tongue, no tonsillar enlargement, neck circumference is enlarged.  CV: Regular rate and rhythm; normal S1 and S2. No murmurs appreciated  Resp: Lungs are clear to auscultation bilaterally. Good movement of air throughout the bases of the lungs bilaterally. No wheezes or crackles.      This patient was seen and staffed with Dr. Gama who agrees with the above assessment and plan.  Raquel Low, MS4    Signed Electronically by: Tra Gama MD    Answers submitted by the patient for this visit:  Patient Health Questionnaire (Submitted on 1/28/2025)  If you checked off any problems, how difficult have these problems made it for you to do your work, take care of things at home, or get along with other people?: Not difficult at all  PHQ9 TOTAL SCORE: 3  Patient Health Questionnaire (G7) (Submitted on 1/28/2025)  REBA 7 TOTAL SCORE: 3    "

## 2025-01-28 NOTE — TELEPHONE ENCOUNTER
called patient to follow up on housing support/resources after discussing this during visit today at [facility].     Voicemail was left for patient asking for a call back when able. SW provided direct number and encouraged patient to leave a voicemail if needed.    EDENILSON Roche

## 2025-01-28 NOTE — NURSING NOTE
"34 year old  Chief Complaint   Patient presents with    Sleep Problem     Snoring and trouble sleeping. Feels like not sleeping well, getting rested. Ongoing.        Blood pressure (!) 128/92, pulse 98, temperature 97  F (36.1  C), temperature source Skin, resp. rate 14, height 1.647 m (5' 4.84\"), weight 96.6 kg (213 lb), SpO2 97%. Body mass index is 35.62 kg/m .  Patient Active Problem List   Diagnosis    Diabetes mellitus, type 2 (H)    Morbid obesity (H)    MDD (major depressive disorder), recurrent episode, moderate (H)    GERD (gastroesophageal reflux disease)    REBA (generalized anxiety disorder)    Essential hypertension    Autism spectrum disorder    Wheezing       Wt Readings from Last 2 Encounters:   01/28/25 96.6 kg (213 lb)   10/29/24 91.2 kg (201 lb)     BP Readings from Last 3 Encounters:   01/28/25 (!) 128/92   10/29/24 129/86   10/15/24 (!) 132/91         Current Outpatient Medications   Medication Sig Dispense Refill    ACCU-CHEK GUIDE test strip USE TO TEST BLOOD SUGAR ONE TIME DAILY OR AS DIRECTED. 200 strip 4    blood glucose (NO BRAND SPECIFIED) lancets standard Use to test blood sugar one times daily or as directed. 100 Lancet 1    blood glucose monitoring (ACCU-CHEK FERMIN PLUS) meter device kit Use to test blood sugars one time daily or as directed. 1 kit 0    blood glucose monitoring (ACCU-CHEK MULTICLIX) lancets Use to test blood sugar 1 times daily or as directed. 102 each 1    Blood Pressure Monitoring (BLOOD PRESSURE CUFF) MISC 1 Units daily 1 each 0    buPROPion (WELLBUTRIN XL) 150 MG 24 hr tablet Take 1 tablet (150 mg) by mouth every morning. 90 tablet 1    fluticasone (FLONASE) 50 MCG/ACT nasal spray Spray 1-2 sprays into both nostrils daily. 11.1 mL 2    hydrOXYzine HCl (ATARAX) 25 MG tablet Take 1 tablet (25 mg) by mouth 3 times daily as needed for anxiety. 90 tablet 0    insulin pen needle (32G X 4 MM) 32G X 4 MM miscellaneous Use 1 pen needles daily or as directed. 30 each 11    " "liraglutide (VICTOZA PEN) 18 MG/3ML solution Inject 1.8 mg subcutaneously daily. 9 mL 2    lisinopril (ZESTRIL) 10 MG tablet Take 1 tablet (10 mg) by mouth daily. 90 tablet 1    metFORMIN (GLUCOPHAGE XR) 500 MG 24 hr tablet Take 4 tablets (2,000 mg) by mouth daily (with dinner). 120 tablet 2    Skin Protectants, Misc. (EUCERIN) cream Apply topically 2 times daily as needed for dry skin 240 g 1     No current facility-administered medications for this visit.       Social History     Tobacco Use    Smoking status: Former     Current packs/day: 0.00     Types: Cigarettes     Quit date:      Years since quittin.0     Passive exposure: Never    Smokeless tobacco: Current     Types: Chew    Tobacco comments:     Chewing tobacco    Vaping Use    Vaping status: Every Day    Substances: Nicotine    Passive vaping exposure: Yes   Substance Use Topics    Alcohol use: Not Currently     Comment: 1 beer periodically. Has binged in the past    Drug use: No       Health Maintenance Due   Topic Date Due    ASTHMA ACTION PLAN  Never done    ADVANCE CARE PLANNING  Never done    DEPRESSION ACTION PLAN  Never done    HEPATITIS A IMMUNIZATION (1 of 2 - Risk 2-dose series) Never done    DIABETIC FOOT EXAM  2024    HEPATITIS B IMMUNIZATION (2 of 3 - 19+ 3-dose series) 2024    ASTHMA CONTROL TEST  2024       No results found for: \"PAP\"      2025 2:44 PM   "

## 2025-02-24 NOTE — NURSING NOTE
"Pedro  34 year old    Chief Complaint   Patient presents with    [facility] Care    Physical    Imm/Inj     Flu Shot    Imm/Inj     COVID-19 VACCINE            Blood pressure 129/86, pulse 93, temperature 98  F (36.7  C), temperature source Skin, resp. rate 14, height 1.647 m (5' 4.84\"), weight 91.2 kg (201 lb), SpO2 97%. Body mass index is 33.61 kg/m .    Patient Active Problem List   Diagnosis    Diabetes mellitus, type 2 (H)    Morbid obesity (H)    MDD (major depressive disorder), recurrent episode, moderate (H)    Intertrigo    GERD (gastroesophageal reflux disease)    REBA (generalized anxiety disorder)    Essential hypertension    Autism spectrum disorder    Chronic kidney disease, stage 1    Wheezing    Rash              Wt Readings from Last 2 Encounters:   10/29/24 91.2 kg (201 lb)   10/15/24 92.2 kg (203 lb 3.2 oz)       BP Readings from Last 3 Encounters:   10/29/24 129/86   10/15/24 (!) 132/91   07/23/24 133/84                Current Outpatient Medications   Medication Sig Dispense Refill    ACCU-CHEK GUIDE test strip USE TO TEST BLOOD SUGAR ONE TIME DAILY OR AS DIRECTED. 200 strip 4    blood glucose (NO BRAND SPECIFIED) lancets standard Use to test blood sugar one times daily or as directed. 100 Lancet 1    blood glucose monitoring (ACCU-CHEK FERMIN PLUS) meter device kit Use to test blood sugars one time daily or as directed. 1 kit 0    blood glucose monitoring (ACCU-CHEK MULTICLIX) lancets Use to test blood sugar 1 times daily or as directed. 102 each 1    Blood Pressure Monitoring (BLOOD PRESSURE CUFF) MISC 1 Units daily 1 each 0    buPROPion (WELLBUTRIN XL) 150 MG 24 hr tablet Take 1 tablet (150 mg) by mouth every morning. 90 tablet 1    Continuous Glucose Sensor (FREESTYLE FATOU 3 SENSOR) MISC 1 each every 14 days Use 1 sensor every 14 days. Use to read blood sugars per 's instructions. 6 each 5    fluticasone (FLONASE) 50 MCG/ACT nasal spray Spray 1-2 sprays into both nostrils daily. " 11.1 mL 2    hydrOXYzine HCl (ATARAX) 25 MG tablet Take 1 tablet (25 mg) by mouth 3 times daily as needed for anxiety. 90 tablet 0    insulin pen needle (32G X 4 MM) 32G X 4 MM miscellaneous Use 1 pen needles daily or as directed. 30 each 11    liraglutide (VICTOZA) 18 MG/3ML solution Inject 1.2 mg subcutaneously daily. 3 mL 2    lisinopril (ZESTRIL) 10 MG tablet Take 1 tablet (10 mg) by mouth daily. 90 tablet 1    metFORMIN (GLUCOPHAGE XR) 500 MG 24 hr tablet Take 4 tablets (2,000 mg) by mouth daily (with dinner). 120 tablet 2    mometasone-formoterol (DULERA) 100-5 MCG/ACT inhaler Inhale 2 puffs into the lungs 2 times daily. 13 g 2    Skin Protectants, Misc. (EUCERIN) cream Apply topically 2 times daily as needed for dry skin 240 g 1     No current facility-administered medications for this visit.              Social History     Tobacco Use    Smoking status: Former     Current packs/day: 0.00     Types: Cigarettes     Quit date: 2016     Years since quittin.8     Passive exposure: Never    Smokeless tobacco: Current     Types: Chew    Tobacco comments:     Chewing tobacco    Vaping Use    Vaping status: Every Day    Substances: Nicotine    Passive vaping exposure: Yes   Substance Use Topics    Alcohol use: Not Currently     Comment: 1 beer periodically. Has binged in the past    Drug use: No              Health Maintenance Due   Topic Date Due    ASTHMA ACTION PLAN  Never done    ADVANCE CARE PLANNING  Never done    DEPRESSION ACTION PLAN  Never done    HEPATITIS A IMMUNIZATION (1 of 2 - Risk 2-dose series) Never done    MEDICARE ANNUAL WELLNESS VISIT  2022    DIABETIC FOOT EXAM  2024    HEPATITIS B IMMUNIZATION (2 of 3 - 19+ 3-dose series) 2024    INFLUENZA VACCINE (1) 2024    COVID-19 Vaccine (3 - 2024- season) 2024          Injectable Influenza Immunization Documentation    1.  Has the patient received the information for the injectable influenza vaccine? YES     2. Is the  "patient 6 months of age or older? YES     3. Does the patient have any of the following contraindications?         Severe allergy to eggs? No     Severe allergic reaction to previous influenza vaccines? No   Severe allergy to latex? No       History of Guillain-Jasper syndrome? No     Currently have a temperature greater than 100.4F? No        4.  Severely egg allergic patients should have flu vaccine eligibility assessed by an MD, RN, or pharmacist, and those who received flu vaccine should be observed for 15 min by an MD, RN, Pharmacist, Medical Technician, or member of clinic staff.\": YES    5. Latex-allergic patients should be given latex-free influenza vaccine Yes. Please reference the Vaccine latex table to determine if your clinic s product is latex-containing.       Vaccination given by Nhung Sandoval LPN on 10/29/2024 at 2:00 PM      October 29, 2024 1:32 PM    " Female

## 2025-03-25 ENCOUNTER — OFFICE VISIT (OUTPATIENT)
Dept: FAMILY MEDICINE | Facility: CLINIC | Age: 35
End: 2025-03-25
Payer: MEDICARE

## 2025-03-25 DIAGNOSIS — E11.9 DIABETES MELLITUS, TYPE 2 (H): Primary | ICD-10-CM

## 2025-03-25 DIAGNOSIS — I10 ESSENTIAL HYPERTENSION: ICD-10-CM

## 2025-03-25 NOTE — Clinical Note
Jet Cavanaugh - We haven't officially met, but [place] the [place] [facility]  I have been working with Pedro for a while, but think he needs more - I'm wondering if he might be able to get an insurance plan that would allow him to obtain medications for free?  Also, am wondering if he might be able to get in home support?  I think Queenie started to work on this and I'm not sure how far she got.  Unfortunatley, he doesn't have a phone now, but did say he'll work to check mychart in the coming days/weeks.  Let me know what you think! Thanks - Nuria

## 2025-03-25 NOTE — Clinical Note
Jet Dutta - Just an update that I had a visit with Pedro last week.  I'm connecting him with Afshan and am hoping she can help. I'm hoping she might be able to look into an insurance plan that would eliminate his copays and that she might also be able to get him some home support.  More to come as I work with Afshan on this! - Nuria

## 2025-03-25 NOTE — PROGRESS NOTES
SUBJECTIVE: Pedro Funez is a 34 year old male who was [place] by [place] [facility] [place]. Pedro does not have a phone right now, but is hoping he has one in early April.     Medication cost: Pedro notes that sometimes he is not able to afford his medications. So at times, he is not able to fill the medications.      Medication use: He thinks he takes metformin more often because he keeps it where he spends more time. Has forgotten about Victoza. He thinks it has been a few months since he's taken it regularly. Recently, he's just been taking it occasionally. Previously was using his phone alarm, but now his phone is not working and he needs a new phone.     Diabetes: Meters are out of batteries. Taking metformin almost daily.     Mental health: Takes wellbutrin on and off. Takes hydroxyzine as needed and he finds that he's taking it once in a while.     HTN: taking lisinopril sporadically, but somewhat regular.     Environmental factors that impact patient: Lives on a very limited income and sometimes checks do not come on time.     Preferred Pharmacy:    [facility]  [facility] [place], [place] - [phone]  [facility] - [place], [place] - [address] [place] [address]     OBJECTIVE:    Patient Active Problem List   Diagnosis    Diabetes mellitus, type 2 (H)    Morbid obesity (H)    MDD (major depressive disorder), recurrent episode, moderate (H)    GERD (gastroesophageal reflux disease)    REBA (generalized anxiety disorder)    Essential hypertension    Autism spectrum disorder    Wheezing        Current Outpatient Medications   Medication Sig Dispense Refill    ACCU-CHEK GUIDE test strip USE TO TEST BLOOD SUGAR ONE TIME DAILY OR AS DIRECTED. 200 strip 4    blood glucose (NO BRAND SPECIFIED) lancets standard Use to test blood sugar one times daily or as directed. 100 Lancet 1    blood  glucose monitoring (ACCU-CHEK FERMIN PLUS) meter device kit Use to test blood sugars one time daily or as directed. 1 kit 0    blood glucose monitoring (ACCU-CHEK MULTICLIX) lancets Use to test blood sugar 1 times daily or as directed. 102 each 1    Blood Pressure Monitoring (BLOOD PRESSURE CUFF) MISC 1 Units daily 1 each 0    buPROPion (WELLBUTRIN XL) 150 MG 24 hr tablet Take 1 tablet (150 mg) by mouth every morning. 90 tablet 1    fluticasone (FLONASE) 50 MCG/ACT nasal spray Spray 1-2 sprays into both nostrils daily. 11.1 mL 2    hydrOXYzine HCl (ATARAX) 25 MG tablet Take 1 tablet (25 mg) by mouth 3 times daily as needed for anxiety. 90 tablet 0    insulin pen needle (32G X 4 MM) 32G X 4 MM miscellaneous Use 1 pen needles daily or as directed. 30 each 11    liraglutide (VICTOZA PEN) 18 MG/3ML solution Inject 1.8 mg subcutaneously daily. 9 mL 2    lisinopril (ZESTRIL) 10 MG tablet Take 1 tablet (10 mg) by mouth daily. 90 tablet 1    metFORMIN (GLUCOPHAGE XR) 500 MG 24 hr tablet Take 4 tablets (2,000 mg) by mouth daily (with dinner). 120 tablet 2    Skin Protectants, Misc. (EUCERIN) cream Apply topically 2 times daily as needed for dry skin 240 g 1       ASSESSMENT:    Medication cost: Will connect with Afshan our .  Many patients receiving MA can have no copays for medications and I wonder if this might be possible for Pedro to switch to a plan like this.   Medication therapy problem: adherence due to cost    Medication use: Likely beneficial to keep all medications where he keeps metformin since this has worked as a reminder.  Might also keep pen needles in that area so that he can remember Victoza.   Medication therapy problem: adherence due to difficulty remembering    Diabetes: Not at goal. Pedro may benefit from a working glucometer since he recently stopped taking medications because he felt his diabetes was at goal.   Medication therapy problem: needs additional monitoring    Mental health and  HTN: For now, will focus on memory aides to take medications per above.      All medications were reviewed and found to be indicated, effective, safe and convenient unless drug therapy problem identified as described above.     PLAN:    Plan is to put your Victoza pen needles and other medications by the metformin.   I am going to reach out to Afshan, our new  to contact you to see how we can help about price of medications  I sent a new glucometer to your pharmacy.  Blood sugar goals are:     - morning before you've eaten and also before meals is      - two hours after meals is less than 180  Our next visit will be April 22 at 1:30 at [place]    Options for treatment and/or follow-up care were reviewed with the patient. Pedro Funez was engaged and actively involved in the decision making process. He/She verbalized understanding of the options discussed and was satisfied with the final plan.     Patient was provided with written instructions/medication list via AVS.     BILLING:     Medical conditions reviewed: 4     Medications reviewed: 6     MTP identified: 3     Time spent: 30 minutes     Level of service: 4

## 2025-03-25 NOTE — PATIENT INSTRUCTIONS
- Plan is to put your Victoza pen needles and other medications by the metformin.   - I am going to reach out to Afshan, our new  to contact you to see how we can help about price of medications  - I sent a new glucometer to your pharmacy.  Blood sugar goals are:     - morning before you've eaten and also before meals is      - two hours after meals is less than 180  - Our next visit will be April 22 at 1:30 at [place]

## 2025-04-22 ENCOUNTER — OFFICE VISIT (OUTPATIENT)
Dept: FAMILY MEDICINE | Facility: CLINIC | Age: 35
End: 2025-04-22
Payer: MEDICARE

## 2025-04-22 VITALS — SYSTOLIC BLOOD PRESSURE: 124 MMHG | DIASTOLIC BLOOD PRESSURE: 85 MMHG

## 2025-04-22 DIAGNOSIS — E11.9 DIABETES MELLITUS, TYPE 2 (H): Primary | ICD-10-CM

## 2025-04-22 DIAGNOSIS — E11.9 DIABETES MELLITUS, TYPE 2 (H): ICD-10-CM

## 2025-04-22 LAB
EST. AVERAGE GLUCOSE BLD GHB EST-MCNC: 200 MG/DL
HBA1C MFR BLD: 8.6 % (ref 0–5.6)

## 2025-04-22 PROCEDURE — 82570 ASSAY OF URINE CREATININE: CPT | Mod: ORL | Performed by: FAMILY MEDICINE

## 2025-04-22 NOTE — PROGRESS NOTES
SUBJECTIVE: Pedro Funez is a 34 year old male who was referred by Tra Gama for [facility] for medication management.     Phone/communication: Pedro now has a new phone and he can get some service and limited talk and data time.  He believes that the full cost of the phone is covered     Limited income: Pedro's SSDI check has not come in.  It is supposed to come on the third of each andrew and so now is almost 20 days late.  He had a friend who has been able to help him and at times, he can use food shelves if needed.  He was able to see people at the social security office  and it sounds like there might be some issue with mail.     Medication use: Pedro notes that he does have medication at home. He has moved his medications to a side table where he sees. He thinks that he is only missing his medications 1-2 times weekly.  He could still be taking Victoza 1.2 mg weekly. Pedro doesn't think that he needs alarms right now.     Pedro hasn't been able to get his medications due to cost. His copays are anywhere from $1 -$11. SSDI check is about 1100 per month and rent is 400 per month.       OBJECTIVE:    Patient Active Problem List   Diagnosis    Diabetes mellitus, type 2 (H)    Morbid obesity (H)    MDD (major depressive disorder), recurrent episode, moderate (H)    GERD (gastroesophageal reflux disease)    REBA (generalized anxiety disorder)    Essential hypertension    Autism spectrum disorder    Wheezing        Current Outpatient Medications   Medication Sig Dispense Refill    ACCU-CHEK GUIDE test strip USE TO TEST BLOOD SUGAR ONE TIME DAILY OR AS DIRECTED. 200 strip 4    blood glucose (NO BRAND SPECIFIED) lancets standard Use to test blood sugar one times daily or as directed. 100 Lancet 1    blood glucose monitoring (ACCU-CHEK FERMIN PLUS) meter device kit Use to test blood sugars one time daily or as directed. 1 kit 0    blood glucose monitoring (ACCU-CHEK MULTICLIX) lancets Use to test blood sugar 1  times daily or as directed. 102 each 1    blood glucose monitoring (NO BRAND SPECIFIED) meter device kit Use to test blood sugar one time daily or as directed. 1 kit 0    Blood Pressure Monitoring (BLOOD PRESSURE CUFF) MISC 1 Units daily 1 each 0    buPROPion (WELLBUTRIN XL) 150 MG 24 hr tablet Take 1 tablet (150 mg) by mouth every morning. 90 tablet 1    fluticasone (FLONASE) 50 MCG/ACT nasal spray Spray 1-2 sprays into both nostrils daily. 11.1 mL 2    hydrOXYzine HCl (ATARAX) 25 MG tablet Take 1 tablet (25 mg) by mouth 3 times daily as needed for anxiety. 90 tablet 0    insulin pen needle (32G X 4 MM) 32G X 4 MM miscellaneous Use 1 pen needles daily or as directed. 30 each 11    liraglutide (VICTOZA PEN) 18 MG/3ML solution Inject 1.8 mg subcutaneously daily. 9 mL 2    lisinopril (ZESTRIL) 10 MG tablet Take 1 tablet (10 mg) by mouth daily. 90 tablet 1    metFORMIN (GLUCOPHAGE XR) 500 MG 24 hr tablet Take 4 tablets (2,000 mg) by mouth daily (with dinner). 120 tablet 2    Skin Protectants, Misc. (EUCERIN) cream Apply topically 2 times daily as needed for dry skin 240 g 1       ASSESSMENT:    Phone/communication: This will now allow for communication    Limited Income: It is possible that there are more resources for Pedro and our  might be able to investigate    Medication use: This has been an ongoing challenge for [facility] and again, per above, I wonder if our  could help with support.   Medication therapy problem: adherence/ ability to take medications    All medications were reviewed and found to be indicated, effective, safe and convenient unless drug therapy problem identified as described above.     PLAN:    I will ask Afshan to reach out to you and see what resources she might be able to connect you with.   When you have a chance, please  Victoza 1.8 mg and switch to that.   Good work on moving your medications so thye're more visible and being able to take them more  consistently.         Options for treatment and/or follow-up care were reviewed with the patient. Pedro Funez was engaged and actively involved in the decision making process. He/She verbalized understanding of the options discussed and was satisfied with the final plan.     Patient was provided with written instructions/medication list via AVS.     BILLING:     Medical conditions reviewed: 3     Medications reviewed: 3     MTP identified: 1     Time spent: 20 minutes     Level of service: [place]

## 2025-04-22 NOTE — PATIENT INSTRUCTIONS
- When you have a chance, please  Victoza 1.8 mg and switch to that.   - Good work on moving your medications so thye're more visible and being able to take them more consistently.   -

## 2025-04-22 NOTE — Clinical Note
Jet Dutta and Afshan - I recently had a visit with Pedro.  He is still challenged by his low income and also difficulty with affording and taking his medications.  Afshan - I wonder if you might be able to reach out to Pedro to establish a connection and see if there might be ways he can be supported?  I know that Queenie in the past was thinking about some in home services, and I think she may have been working on that when she left?  Nuria

## 2025-04-23 LAB
CREAT UR-MCNC: 166 MG/DL
MICROALBUMIN UR-MCNC: 101 MG/L
MICROALBUMIN/CREAT UR: 60.84 MG/G CR (ref 0–17)

## 2025-05-12 ENCOUNTER — TELEPHONE (OUTPATIENT)
Dept: FAMILY MEDICINE | Facility: CLINIC | Age: 35
End: 2025-05-12

## 2025-05-12 NOTE — TELEPHONE ENCOUNTER
[facility] Care Coordination Follow Up Note    Date: May 12, 2025    Concerns Addressed: [place] Choices [place] Scheduling     Person spoke with: Ruby Carranza with [facility] Scheduling Phone: [phone]    Additional Information: Received phone call from Ruby Tere with [facility] Scheduling (Phone [phone]) this morning. She reports the Critical access hospital has been unable to reach the patient to get a [place] Cho[place] scheduled. Provided Ruby with [facility] with patient's current phone number. Ruby reports they will reach back out to  if they are still unable to reach the patient.     Afshan BENÍTEZ, LGSW      [facility]   05/12/25 10:18 AM  Phone [phone]

## 2025-05-15 NOTE — TELEPHONE ENCOUNTER
[facility] Care Coordination Follow Up Note    Date: May 15, 2025    Concerns Addressed:  [place] Choices Assessment Scheduling     Person spoke with:  Ruby Carranza with [facility] Scheduling Phone: [phone]. Social Work also spoke with the patient Pedro today.    Additional Information: Social Work called and spoke with Ruby Carranza with [facility] Scheduling this morning at 8:05 am. Ruby reports that the patient is [place] for a [place] on Wednesday, May 28 at 1 pm. The [facility]  assigned to complete the assessment is Kathy Lira Phone: [phone].      Social Work also called and spoke with Pedro this morning to introduce self and explain role. Social Work utilized the following support during this interaction: active listening, facilitating a calm, non-judgmental conversation. Pedro reports that he does not have any questions or concerns at this time. Social Work provided patient with the phone number to the [facility]  and encouraged him to reach out with any questions or concerns.       Follow Up or Next Steps: Social Work will continue to follow up as needed.    Afshan BENÍTEZ, PATRICIA      [facility]   05/15/25 8:29 AM  Phone [phone]

## 2025-06-09 ENCOUNTER — TELEPHONE (OUTPATIENT)
Dept: FAMILY MEDICINE | Facility: CLINIC | Age: 35
End: 2025-06-09

## 2025-06-09 NOTE — TELEPHONE ENCOUNTER
[facility] Care Coordination Follow Up Note    Date: June 9, 2025    Concerns Addressed: [facility] Long Term Supports and Services request for ICD-10 Verification form    Additional Information: Received ICD-10 Verification Form from Kathy Lira  [facility] Supp[facility]. Completed and placed form in Dr. Gama's Inbox for signature. Once form is signed by Dr. Gama, the form can be faxed to Kathy Monsalve at fax [phone].     Follow Up or Next Steps: Once form is signed by Dr. Gama, the form can be faxed to Kathy Monsalve at fax [phone].     Afshan BENÍTEZ, [facility]      [facility]   06/09/25 8:45 AM  Phone [phone]

## 2025-06-11 NOTE — TELEPHONE ENCOUNTER
[facility] Care Coordination Follow Up Note    Date: June 11, 2025    Concerns Addressed: [facility] Long Term Supports and Services request for ICD-10 Verification form     Update: Dr. Gama signed the General acute hospital Term Supports and Services request for ICD-10 Verification form. Social Work faxed the completed and signed form to Kathy Monsalve, , [facility] at fax [phone].     Follow Up or Next Steps: Social Work will follow up as needed.     Afshan BENÍTEZ, [facility]      [facility]   06/11/25 8:48 AM  Phone [phone]

## 2025-06-17 ENCOUNTER — OFFICE VISIT (OUTPATIENT)
Dept: FAMILY MEDICINE | Facility: CLINIC | Age: 35
End: 2025-06-17
Payer: COMMERCIAL

## 2025-06-17 DIAGNOSIS — E11.65 TYPE 2 DIABETES MELLITUS WITH HYPERGLYCEMIA, WITHOUT LONG-TERM CURRENT USE OF INSULIN (H): ICD-10-CM

## 2025-06-17 DIAGNOSIS — F33.1 MDD (MAJOR DEPRESSIVE DISORDER), RECURRENT EPISODE, MODERATE (H): ICD-10-CM

## 2025-06-17 DIAGNOSIS — E11.9 DIABETES MELLITUS, TYPE 2 (H): ICD-10-CM

## 2025-06-17 DIAGNOSIS — I10 BENIGN ESSENTIAL HYPERTENSION: ICD-10-CM

## 2025-06-17 DIAGNOSIS — F41.9 ANXIETY: ICD-10-CM

## 2025-06-17 RX ORDER — HYDROXYZINE HYDROCHLORIDE 25 MG/1
25 TABLET, FILM COATED ORAL 3 TIMES DAILY PRN
Qty: 90 TABLET | Refills: 0 | Status: SHIPPED | OUTPATIENT
Start: 2025-06-17

## 2025-06-17 RX ORDER — BUPROPION HYDROCHLORIDE 150 MG/1
150 TABLET ORAL EVERY MORNING
Qty: 90 TABLET | Refills: 1 | Status: SHIPPED | OUTPATIENT
Start: 2025-06-17

## 2025-06-17 RX ORDER — LIRAGLUTIDE 6 MG/ML
1.8 INJECTION SUBCUTANEOUS DAILY
Qty: 9 ML | Refills: 2 | Status: SHIPPED | OUTPATIENT
Start: 2025-06-17

## 2025-06-17 RX ORDER — LISINOPRIL 10 MG/1
10 TABLET ORAL DAILY
Qty: 90 TABLET | Refills: 1 | Status: SHIPPED | OUTPATIENT
Start: 2025-06-17

## 2025-06-17 RX ORDER — METFORMIN HYDROCHLORIDE 500 MG/1
2000 TABLET, EXTENDED RELEASE ORAL
Qty: 120 TABLET | Refills: 2 | Status: SHIPPED | OUTPATIENT
Start: 2025-06-17
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
## 2025-06-17 NOTE — PATIENT INSTRUCTIONS
- I sent in refills of your chronic medications.  They should be ready at the pharmacy soon.  You might consider calling the pharmacy to learn about the copays and if they are indeed $0 copays, you could go sooner.   - Today you set phone alarms as a reminder for your medications.

## 2025-06-17 NOTE — Clinical Note
Jet Dutta and JOHAN Cavanaugh that I had a visit with Pedro.  Taking medications is still a struggle for him and has been for many years. I did refill his chronic medications for him and continued to brainstorm with him about ways to take his medications. I think there is some big disorganization or other disconnect for him and so I think support (meds nurse?) would be a big benefit.  Afshan - he did share that he has had two appointments with his ARMS worker and the person has no showed/ no called. As a result, he's never connected with the ARMS worker an I'm wondering if that ARMS worker would be able to set up some support that I feel he so much needs. Are you connected with his [facility] ?  Do you think that person might be able to facilitate a connection between [facility] and his arms worker? Thanks, Nuria

## 2025-07-08 ENCOUNTER — TELEPHONE (OUTPATIENT)
Dept: FAMILY MEDICINE | Facility: CLINIC | Age: 35
End: 2025-07-08

## 2025-07-08 DIAGNOSIS — E11.65 TYPE 2 DIABETES MELLITUS WITH HYPERGLYCEMIA, WITHOUT LONG-TERM CURRENT USE OF INSULIN (H): Primary | ICD-10-CM

## 2025-07-08 NOTE — TELEPHONE ENCOUNTER
Prior Authorization Retail Medication Request    Medication/Dose: liraglutide (VICTOZA PEN) 18 MG/3ML solution  Diagnosis and ICD code (if different than what is on RX):  same  New/renewal/insurance change PA/secondary ins. PA:  Previously Tried and Failed:  same  Rationale:  same    Insurance   Primary: same  Insurance ID:  same    Secondary (if applicable):same  Insurance ID:  same    Pharmacy Information (if different than what is on RX)  Name:  same  Phone:  same  Fax:same    Clinic Information  Preferred routing pool for dept communication: [facility] pool    HOLDEN Banda, RN  07/08/25, 4:33 PM

## 2025-07-10 NOTE — TELEPHONE ENCOUNTER
Central Prior Authorization Team   Phone: [phone]    PA Initiation    Medication: liraglutide (VICTOZA PEN) 18 MG/3ML solution  Insurance Company: [employer] - Phone [phone] Fax [phone]  Pharmacy Filling the Rx: [facility] [place], [place] - [address] [place] [address] [id number]  Filling Pharmacy Phone: [phone]  Filling Pharmacy Fax:    Start Date: 7/10/2025

## 2025-07-14 NOTE — TELEPHONE ENCOUNTER
PRIOR AUTHORIZATION DENIED    Medication: liraglutide (VICTOZA PEN) 18 MG/3ML solution-PA DENIED     Denial Date: 7/10/2025    Denial Rational:           Appeal Information: